# Patient Record
Sex: MALE | Race: WHITE | NOT HISPANIC OR LATINO | Employment: FULL TIME | URBAN - NONMETROPOLITAN AREA
[De-identification: names, ages, dates, MRNs, and addresses within clinical notes are randomized per-mention and may not be internally consistent; named-entity substitution may affect disease eponyms.]

---

## 2018-08-22 ENCOUNTER — OFFICE VISIT (OUTPATIENT)
Dept: CARDIOLOGY | Facility: CLINIC | Age: 48
End: 2018-08-22

## 2018-08-22 VITALS
SYSTOLIC BLOOD PRESSURE: 137 MMHG | OXYGEN SATURATION: 96 % | BODY MASS INDEX: 31.67 KG/M2 | HEIGHT: 67 IN | DIASTOLIC BLOOD PRESSURE: 91 MMHG | WEIGHT: 201.8 LBS | HEART RATE: 87 BPM

## 2018-08-22 DIAGNOSIS — Z76.89 ENCOUNTER TO ESTABLISH CARE: ICD-10-CM

## 2018-08-22 DIAGNOSIS — I10 ESSENTIAL HYPERTENSION: Primary | ICD-10-CM

## 2018-08-22 DIAGNOSIS — R07.9 CHEST PAIN, UNSPECIFIED TYPE: ICD-10-CM

## 2018-08-22 DIAGNOSIS — R06.02 SOB (SHORTNESS OF BREATH): ICD-10-CM

## 2018-08-22 PROCEDURE — 99204 OFFICE O/P NEW MOD 45 MIN: CPT | Performed by: PHYSICIAN ASSISTANT

## 2018-08-22 PROCEDURE — 93000 ELECTROCARDIOGRAM COMPLETE: CPT | Performed by: PHYSICIAN ASSISTANT

## 2018-08-22 RX ORDER — NITROGLYCERIN 0.4 MG/1
TABLET SUBLINGUAL
Qty: 100 TABLET | Refills: 11 | Status: SHIPPED | OUTPATIENT
Start: 2018-08-22

## 2018-08-22 RX ORDER — SERTRALINE HYDROCHLORIDE 100 MG/1
TABLET, FILM COATED ORAL DAILY
COMMUNITY
Start: 2015-05-01

## 2018-08-22 RX ORDER — BUTALBITAL, ACETAMINOPHEN AND CAFFEINE 300; 40; 50 MG/1; MG/1; MG/1
CAPSULE ORAL AS NEEDED
Refills: 0 | COMMUNITY
Start: 2018-08-14

## 2018-08-22 RX ORDER — LISINOPRIL 40 MG/1
40 TABLET ORAL DAILY
Refills: 1 | COMMUNITY
Start: 2018-08-14

## 2018-08-22 NOTE — PATIENT INSTRUCTIONS
Heart-Healthy Eating Plan  Many factors influence your heart health, including eating and exercise habits. Heart (coronary) risk increases with abnormal blood fat (lipid) levels. Heart-healthy meal planning includes limiting unhealthy fats, increasing healthy fats, and making other small dietary changes. This includes maintaining a healthy body weight to help keep lipid levels within a normal range.  What is my plan?  Your health care provider recommends that you:  · Get no more than _________% of the total calories in your daily diet from fat.  · Limit your intake of saturated fat to less than _________% of your total calories each day.  · Limit the amount of cholesterol in your diet to less than _________ mg per day.    What types of fat should I choose?  · Choose healthy fats more often. Choose monounsaturated and polyunsaturated fats, such as olive oil and canola oil, flaxseeds, walnuts, almonds, and seeds.  · Eat more omega-3 fats. Good choices include salmon, mackerel, sardines, tuna, flaxseed oil, and ground flaxseeds. Aim to eat fish at least two times each week.  · Limit saturated fats. Saturated fats are primarily found in animal products, such as meats, butter, and cream. Plant sources of saturated fats include palm oil, palm kernel oil, and coconut oil.  · Avoid foods with partially hydrogenated oils in them. These contain trans fats. Examples of foods that contain trans fats are stick margarine, some tub margarines, cookies, crackers, and other baked goods.  What general guidelines do I need to follow?  · Check food labels carefully to identify foods with trans fats or high amounts of saturated fat.  · Fill one half of your plate with vegetables and green salads. Eat 4-5 servings of vegetables per day. A serving of vegetables equals 1 cup of raw leafy vegetables, ½ cup of raw or cooked cut-up vegetables, or ½ cup of vegetable juice.  · Fill one fourth of your plate with whole grains. Look for the word  "\"whole\" as the first word in the ingredient list.  · Fill one fourth of your plate with lean protein foods.  · Eat 4-5 servings of fruit per day. A serving of fruit equals one medium whole fruit, ¼ cup of dried fruit, ½ cup of fresh, frozen, or canned fruit, or ½ cup of 100% fruit juice.  · Eat more foods that contain soluble fiber. Examples of foods that contain this type of fiber are apples, broccoli, carrots, beans, peas, and barley. Aim to get 20-30 g of fiber per day.  · Eat more home-cooked food and less restaurant, buffet, and fast food.  · Limit or avoid alcohol.  · Limit foods that are high in starch and sugar.  · Avoid fried foods.  · Cook foods by using methods other than frying. Baking, boiling, grilling, and broiling are all great options. Other fat-reducing suggestions include:  ? Removing the skin from poultry.  ? Removing all visible fats from meats.  ? Skimming the fat off of stews, soups, and gravies before serving them.  ? Steaming vegetables in water or broth.  · Lose weight if you are overweight. Losing just 5-10% of your initial body weight can help your overall health and prevent diseases such as diabetes and heart disease.  · Increase your consumption of nuts, legumes, and seeds to 4-5 servings per week. One serving of dried beans or legumes equals ½ cup after being cooked, one serving of nuts equals 1½ ounces, and one serving of seeds equals ½ ounce or 1 tablespoon.  · You may need to monitor your salt (sodium) intake, especially if you have high blood pressure. Talk with your health care provider or dietitian to get more information about reducing sodium.  What foods can I eat?  Grains    Breads, including Colombian, white, prachi, wheat, raisin, rye, oatmeal, and Italian. Tortillas that are neither fried nor made with lard or trans fat. Low-fat rolls, including hotdog and hamburger buns and English muffins. Biscuits. Muffins. Waffles. Pancakes. Light popcorn. Whole-grain cereals. Flatbread. " Fiona toast. Pretzels. Breadsticks. Rusks. Low-fat snacks and crackers, including oyster, saltine, matzo, hugo, animal, and rye. Rice and pasta, including brown rice and those that are made with whole wheat.  Vegetables  All vegetables.  Fruits  All fruits, but limit coconut.  Meats and Other Protein Sources  Lean, well-trimmed beef, veal, pork, and lamb. Chicken and turkey without skin. All fish and shellfish. Wild duck, rabbit, pheasant, and venison. Egg whites or low-cholesterol egg substitutes. Dried beans, peas, lentils, and tofu. Seeds and most nuts.  Dairy  Low-fat or nonfat cheeses, including ricotta, string, and mozzarella. Skim or 1% milk that is liquid, powdered, or evaporated. Buttermilk that is made with low-fat milk. Nonfat or low-fat yogurt.  Beverages  Mineral water. Diet carbonated beverages.  Sweets and Desserts  Sherbets and fruit ices. Honey, jam, marmalade, jelly, and syrups. Meringues and gelatins. Pure sugar candy, such as hard candy, jelly beans, gumdrops, mints, marshmallows, and small amounts of dark chocolate. Parag food cake.  Eat all sweets and desserts in moderation.  Fats and Oils  Nonhydrogenated (trans-free) margarines. Vegetable oils, including soybean, sesame, sunflower, olive, peanut, safflower, corn, canola, and cottonseed. Salad dressings or mayonnaise that are made with a vegetable oil. Limit added fats and oils that you use for cooking, baking, salads, and as spreads.  Other  Cocoa powder. Coffee and tea. All seasonings and condiments.  The items listed above may not be a complete list of recommended foods or beverages. Contact your dietitian for more options.  What foods are not recommended?  Grains  Breads that are made with saturated or trans fats, oils, or whole milk. Croissants. Butter rolls. Cheese breads. Sweet rolls. Donuts. Buttered popcorn. Chow mein noodles. High-fat crackers, such as cheese or butter crackers.  Meats and Other Protein Sources  Fatty meats, such  as hotdogs, short ribs, sausage, spareribs, delgado, ribeye roast or steak, and mutton. High-fat deli meats, such as salami and bologna. Caviar. Domestic duck and goose. Organ meats, such as kidney, liver, sweetbreads, brains, gizzard, chitterlings, and heart.  Dairy  Cream, sour cream, cream cheese, and creamed cottage cheese. Whole milk cheeses, including blue (inge), [place] Jerman, Brie, Ruben, American, Havarti, Swiss, cheddar, Camembert, and Putnam. Whole or 2% milk that is liquid, evaporated, or condensed. Whole buttermilk. Cream sauce or high-fat cheese sauce. Yogurt that is made from whole milk.  Beverages  Regular sodas and drinks with added sugar.  Sweets and Desserts  Frosting. Pudding. Cookies. Cakes other than odette food cake. Candy that has milk chocolate or white chocolate, hydrogenated fat, butter, coconut, or unknown ingredients. Buttered syrups. Full-fat ice cream or ice cream drinks.  Fats and Oils  Gravy that has suet, meat fat, or shortening. Cocoa butter, hydrogenated oils, palm oil, coconut oil, palm kernel oil. These can often be found in baked products, candy, fried foods, nondairy creamers, and whipped toppings. Solid fats and shortenings, including delgado fat, salt pork, lard, and butter. Nondairy cream substitutes, such as coffee creamers and sour cream substitutes. Salad dressings that are made of unknown oils, cheese, or sour cream.  The items listed above may not be a complete list of foods and beverages to avoid. Contact your dietitian for more information.  This information is not intended to replace advice given to you by your health care provider. Make sure you discuss any questions you have with your health care provider.  Document Released: 09/26/2009 Document Revised: 07/07/2017 Document Reviewed: 06/11/2015  [employer] Interactive Patient Education © 2018 [employer]  For more information:    Quit Now [place]  1-800-QUIT-NOW  https://snehay.quitlogix.org/en-US/  Steps to Quit  Smoking  Smoking tobacco can be harmful to your health and can affect almost every organ in your body. Smoking puts you, and those around you, at risk for developing many serious chronic diseases. Quitting smoking is difficult, but it is one of the best things that you can do for your health. It is never too late to quit.  What are the benefits of quitting smoking?  When you quit smoking, you lower your risk of developing serious diseases and conditions, such as:  · Lung cancer or lung disease, such as COPD.  · Heart disease.  · Stroke.  · Heart attack.  · Infertility.  · Osteoporosis and bone fractures.  Additionally, symptoms such as coughing, wheezing, and shortness of breath may get better when you quit. You may also find that you get sick less often because your body is stronger at fighting off colds and infections. If you are pregnant, quitting smoking can help to reduce your chances of having a baby of low birth weight.  How do I get ready to quit?  When you decide to quit smoking, create a plan to make sure that you are successful. Before you quit:  · Pick a date to quit. Set a date within the next two weeks to give you time to prepare.  · Write down the reasons why you are quitting. Keep this list in places where you will see it often, such as on your bathroom mirror or in your car or wallet.  · Identify the people, places, things, and activities that make you want to smoke (triggers) and avoid them. Make sure to take these actions:  ¨ Throw away all cigarettes at home, at work, and in your car.  ¨ Throw away smoking accessories, such as ashtrays and lighters.  ¨ Clean your car and make sure to empty the ashtray.  ¨ Clean your home, including curtains and carpets.  · Tell your family, friends, and coworkers that you are quitting. Support from your loved ones can make quitting easier.  · Talk with your health care provider about your options for quitting smoking.  · Find out what treatment options are  covered by your health insurance.  What strategies can I use to quit smoking?  Talk with your healthcare provider about different strategies to quit smoking. Some strategies include:  · Quitting smoking altogether instead of gradually lessening how much you smoke over a period of time. Research shows that quitting “cold turkey” is more successful than gradually quitting.  · Attending in-person counseling to help you build problem-solving skills. You are more likely to have success in quitting if you attend several counseling sessions. Even short sessions of 10 minutes can be effective.  · Finding resources and support systems that can help you to quit smoking and remain smoke-free after you quit. These resources are most helpful when you use them often. They can include:  ¨ Online chats with a counselor.  ¨ Telephone quitlines.  ¨ Printed self-help materials.  ¨ Support groups or group counseling.  ¨ Text messaging programs.  ¨ Mobile phone applications.  · Taking medicines to help you quit smoking. (If you are pregnant or breastfeeding, talk with your health care provider first.) Some medicines contain nicotine and some do not. Both types of medicines help with cravings, but the medicines that include nicotine help to relieve withdrawal symptoms. Your health care provider may recommend:  ¨ Nicotine patches, gum, or lozenges.  ¨ Nicotine inhalers or sprays.  ¨ Non-nicotine medicine that is taken by mouth.  Talk with your health care provider about combining strategies, such as taking medicines while you are also receiving in-person counseling. Using these two strategies together makes you more likely to succeed in quitting than if you used either strategy on its own.  If you are pregnant or breastfeeding, talk with your health care provider about finding counseling or other support strategies to quit smoking. Do not take medicine to help you quit smoking unless told to do so by your health care provider.  What  things can I do to make it easier to quit?  Quitting smoking might feel overwhelming at first, but there is a lot that you can do to make it easier. Take these important actions:  · Reach out to your family and friends and ask that they support and encourage you during this time. Call telephone quitlines, reach out to support groups, or work with a counselor for support.  · Ask people who smoke to avoid smoking around you.  · Avoid places that trigger you to smoke, such as bars, parties, or smoke-break areas at work.  · Spend time around people who do not smoke.  · Lessen stress in your life, because stress can be a smoking trigger for some people. To lessen stress, try:  ¨ Exercising regularly.  ¨ Deep-breathing exercises.  ¨ Yoga.  ¨ Meditating.  ¨ Performing a body scan. This involves closing your eyes, scanning your body from head to toe, and noticing which parts of your body are particularly tense. Purposefully relax the muscles in those areas.  · Download or purchase mobile phone or tablet apps (applications) that can help you stick to your quit plan by providing reminders, tips, and encouragement. There are many free apps, such as QuitGuide from the CDC (Centers for Disease Control and Prevention). You can find other support for quitting smoking (smoking cessation) through smokefree.gov and other websites.  How will I feel when I quit smoking?  Within the first 24 hours of quitting smoking, you may start to feel some withdrawal symptoms. These symptoms are usually most noticeable 2-3 days after quitting, but they usually do not last beyond 2-3 weeks. Changes or symptoms that you might experience include:  · Mood swings.  · Restlessness, anxiety, or irritation.  · Difficulty concentrating.  · Dizziness.  · Strong cravings for sugary foods in addition to nicotine.  · Mild weight gain.  · Constipation.  · Nausea.  · Coughing or a sore throat.  · Changes in how your medicines work in your body.  · A depressed  mood.  · Difficulty sleeping (insomnia).  After the first 2-3 weeks of quitting, you may start to notice more positive results, such as:  · Improved sense of smell and taste.  · Decreased coughing and sore throat.  · Slower heart rate.  · Lower blood pressure.  · Clearer skin.  · The ability to breathe more easily.  · Fewer sick days.  Quitting smoking is very challenging for most people. Do not get discouraged if you are not successful the first time. Some people need to make many attempts to quit before they achieve long-term success. Do your best to stick to your quit plan, and talk with your health care provider if you have any questions or concerns.  This information is not intended to replace advice given to you by your health care provider. Make sure you discuss any questions you have with your health care provider.  Document Released: 12/12/2002 Document Revised: 08/15/2017 Document Reviewed: 05/03/2016  [employer] Interactive Patient Education © 2017 [employer]

## 2018-08-22 NOTE — PROGRESS NOTES
Subjective   Ronaldo Spain is a 48 y.o. male     Chief Complaint   Patient presents with   • Establish Care     presents to [facility], [place] in 2013   • Hypertension   • Shortness of Breath       HPI    Patient is a 48-year-old male that presents to the office for an initial evaluation.  Patient describes having history of coronary artery disease.  He describes suffering a myocardial infarction in the past.  In 2013 he underwent cardiac catheterization by Dr. Sanchez in [place].  We currently do not have records.  He described not having any stenting procedures performed.    Patient has not seen a cardiologist or been under cardiology care since 2013.  He has discomfort at times which is very minimal.  He relates more to his shortness of breath which has progressed.  However, he has scant weight continues to smoke and relates it to those entities.    He does not have any PND orthopnea.  He doesn't palpitate or have dysrhythmic symptoms.  He otherwise is doing well and voices no complaints      Current Outpatient Prescriptions   Medication Sig Dispense Refill   • butalbital-acetaminophen-caffeine (ORBIVAN) -40 MG capsule capsule As Needed.  0   • lisinopril (PRINIVIL,ZESTRIL) 40 MG tablet Take 40 mg by mouth Daily. for blood pressure  1   • sertraline (ZOLOFT) 100 MG tablet Take  by mouth Daily.     • aspirin 81 MG tablet Take 1 tablet by mouth Daily. 30 tablet 11   • nitroglycerin (NITROSTAT) 0.4 MG SL tablet 1 under the tongue as needed for angina, may repeat q5mins for up three doses 100 tablet 11     No current facility-administered medications for this visit.        Hydrocodone-acetaminophen; Protonix  [pantoprazole sodium]; Penicillins; and Sulfa antibiotics    Past Medical History:   Diagnosis Date   • Crohn's disease (CMS/[facility])    • Myocardial infarction 08/18/2013       Social History     Social History   • Marital status: Single     Spouse name: N/A   • Number of children: N/A   • Years of  "education: N/A     Occupational History   • Not on file.     Social History Main Topics   • Smoking status: Current Every Day Smoker     Packs/day: 1.00     Years: 35.00     Types: Cigarettes   • Smokeless tobacco: Never Used   • Alcohol use No   • Drug use: No   • Sexual activity: Not on file     Other Topics Concern   • Not on file     Social History Narrative   • No narrative on file           Family History   Problem Relation Age of Onset   • Hypertension Mother    • Stroke Mother        Review of Systems   Constitutional: Positive for diaphoresis and fatigue.   HENT: Negative.    Eyes: Positive for visual disturbance (wears glasses).   Respiratory: Positive for shortness of breath (on exertion).    Cardiovascular: Positive for chest pain and leg swelling. Negative for palpitations.   Gastrointestinal: Positive for constipation, diarrhea, nausea and vomiting.        Crohns disease   Endocrine: Negative.    Genitourinary: Positive for difficulty urinating ( slow flow).   Musculoskeletal: Positive for arthralgias, back pain, myalgias and neck pain.   Skin: Negative.    Allergic/Immunologic: Negative.    Neurological: Negative.    Hematological: Negative.    Psychiatric/Behavioral: Positive for sleep disturbance.   All other systems reviewed and are negative.      Objective   Vitals:    08/22/18 1332   BP: 137/91   BP Location: Right arm   Patient Position: Sitting   Pulse: 87   SpO2: 96%   Weight: 91.5 kg (201 lb 12.8 oz)   Height: 170.2 cm (67\")      /91 (BP Location: Right arm, Patient Position: Sitting)   Pulse 87   Ht 170.2 cm (67\")   Wt 91.5 kg (201 lb 12.8 oz)   SpO2 96%   BMI 31.61 kg/m²     Lab Results (most recent)     None          Physical Exam   Constitutional: He is oriented to person, place, and time. He appears well-developed and well-nourished. No distress.   HENT:   Head: Normocephalic and atraumatic.   Eyes: Pupils are equal, round, and reactive to light. EOM are normal.   Neck: No JVD " present.   Cardiovascular: Normal rate, regular rhythm, normal heart sounds and intact distal pulses.  Exam reveals no gallop and no friction rub.    No murmur heard.  Pulmonary/Chest: Effort normal and breath sounds normal. No respiratory distress. He has no wheezes. He has no rales. He exhibits no tenderness.   Musculoskeletal: Normal range of motion. He exhibits no edema.   Neurological: He is alert and oriented to person, place, and time. No cranial nerve deficit.   Skin: Skin is warm and dry. No rash noted. No erythema. No pallor.   Psychiatric: He has a normal mood and affect. His behavior is normal.   Nursing note and vitals reviewed.      Procedure     ECG 12 Lead  Date/Time: 8/22/2018 1:45 PM  Performed by: MAXIMO MAYES  Authorized by: MAXIMO MAYES   Comments: EKG demonstrates sinus rhythm at 90 bpm with no acute ST changes                 Assessment/Plan     Problems Addressed this Visit        Cardiovascular and Mediastinum    Essential hypertension - Primary    Relevant Medications    lisinopril (PRINIVIL,ZESTRIL) 40 MG tablet    Other Relevant Orders    ECG 12 Lead    Adult Transthoracic Echo Complete W/ Cont if Necessary Per Protocol    Stress Test With Myocardial Perfusion One Day       Respiratory    SOB (shortness of breath)    Relevant Orders    ECG 12 Lead    Adult Transthoracic Echo Complete W/ Cont if Necessary Per Protocol    Stress Test With Myocardial Perfusion One Day       Nervous and Auditory    Chest pain       Other    Encounter to establish care    Relevant Orders    ECG 12 Lead    Adult Transthoracic Echo Complete W/ Cont if Necessary Per Protocol    Stress Test With Myocardial Perfusion One Day              Recommendation  1.  Patient with history of myocardial infarction but we currently do not have any records.  He complains of shortness of breath and occasional discomfort.  I feel cardiac evaluation is warranted.  I would like to reassess LV function and evaluate and rule  out any evidence of cardiomyopathy.  I would like to obtain an echocardiogram.  Stress test to rule out any ischemia because of progressive dyspnea and occasional chest pain.  I recommended him starting aspirin daily.  I will send in nitroglycerin..  We'll send in nitroglycerin as needed for chest pain.  He will follow-up with primary as scheduled.  He will follow-up with her office after testing         I advised Ronaldo of the risks of continuing to use tobacco, and I provided him with tobacco cessation educational materials in the After Visit Summary.     During this visit, I spent <3 minutes counseling the patient regarding tobacco cessation.     Patient's Body mass index is 31.61 kg/m². BMI is above normal parameters. Recommendations include: educational material.         Electronically signed by:

## 2020-06-10 ENCOUNTER — TRANSCRIBE ORDERS (OUTPATIENT)
Dept: ADMINISTRATIVE | Facility: HOSPITAL | Age: 50
End: 2020-06-10

## 2020-06-10 DIAGNOSIS — R07.89 CHEST WALL PAIN: Primary | ICD-10-CM

## 2020-06-11 ENCOUNTER — HOSPITAL ENCOUNTER (OUTPATIENT)
Dept: CT IMAGING | Facility: HOSPITAL | Age: 50
Discharge: HOME | End: 2020-06-11
Admitting: FAMILY MEDICINE

## 2020-06-11 DIAGNOSIS — R07.89 CHEST WALL PAIN: ICD-10-CM

## 2020-06-11 PROCEDURE — 71250 CT THORAX DX C-: CPT | Performed by: RADIOLOGY

## 2020-06-11 PROCEDURE — 71250 CT THORAX DX C-: CPT

## 2021-03-18 ENCOUNTER — BULK ORDERING (OUTPATIENT)
Dept: CASE MANAGEMENT | Facility: OTHER | Age: 51
End: 2021-03-18

## 2021-03-18 DIAGNOSIS — Z23 IMMUNIZATION DUE: ICD-10-CM

## 2021-10-20 ENCOUNTER — TRANSCRIBE ORDERS (OUTPATIENT)
Dept: ADMINISTRATIVE | Facility: HOSPITAL | Age: 51
End: 2021-10-20

## 2021-10-20 DIAGNOSIS — M79.601 BILATERAL ARM PAIN: Primary | ICD-10-CM

## 2021-10-20 DIAGNOSIS — M79.602 BILATERAL ARM PAIN: Primary | ICD-10-CM

## 2021-10-25 ENCOUNTER — HOSPITAL ENCOUNTER (OUTPATIENT)
Dept: MRI IMAGING | Facility: HOSPITAL | Age: 51
Discharge: HOME | End: 2021-10-25
Admitting: FAMILY MEDICINE

## 2021-10-25 DIAGNOSIS — M79.601 BILATERAL ARM PAIN: ICD-10-CM

## 2021-10-25 DIAGNOSIS — M79.602 BILATERAL ARM PAIN: ICD-10-CM

## 2021-10-25 PROCEDURE — 72141 MRI NECK SPINE W/O DYE: CPT | Performed by: RADIOLOGY

## 2021-10-25 PROCEDURE — 72141 MRI NECK SPINE W/O DYE: CPT

## 2022-02-28 ENCOUNTER — TRANSCRIBE ORDERS (OUTPATIENT)
Dept: LAB | Facility: HOSPITAL | Age: 52
End: 2022-02-28

## 2022-02-28 DIAGNOSIS — Z01.818 OTHER SPECIFIED PRE-OPERATIVE EXAMINATION: Primary | ICD-10-CM

## 2022-03-02 ENCOUNTER — LAB (OUTPATIENT)
Dept: LAB | Facility: HOSPITAL | Age: 52
End: 2022-03-02

## 2022-03-02 DIAGNOSIS — Z01.818 OTHER SPECIFIED PRE-OPERATIVE EXAMINATION: ICD-10-CM

## 2022-03-02 LAB — SARS-COV-2 RNA PNL SPEC NAA+PROBE: NOT DETECTED

## 2022-03-02 PROCEDURE — C9803 HOPD COVID-19 SPEC COLLECT: HCPCS

## 2022-03-02 PROCEDURE — U0004 COV-19 TEST NON-CDC HGH THRU: HCPCS | Performed by: NEUROLOGICAL SURGERY

## 2022-03-12 ENCOUNTER — HOSPITAL ENCOUNTER (EMERGENCY)
Facility: HOSPITAL | Age: 52
Discharge: HOME | End: 2022-03-12
Attending: EMERGENCY MEDICINE | Admitting: FAMILY MEDICINE

## 2022-03-12 ENCOUNTER — APPOINTMENT (OUTPATIENT)
Dept: ULTRASOUND IMAGING | Facility: HOSPITAL | Age: 52
End: 2022-03-12

## 2022-03-12 VITALS
OXYGEN SATURATION: 99 % | RESPIRATION RATE: 14 BRPM | TEMPERATURE: 97.1 F | HEART RATE: 85 BPM | DIASTOLIC BLOOD PRESSURE: 78 MMHG | BODY MASS INDEX: 34.6 KG/M2 | WEIGHT: 220.46 LBS | SYSTOLIC BLOOD PRESSURE: 169 MMHG | HEIGHT: 67 IN

## 2022-03-12 DIAGNOSIS — I80.02 THROMBOPHLEBITIS OF SUPERFICIAL VEINS OF LEFT LOWER EXTREMITY: Primary | ICD-10-CM

## 2022-03-12 LAB
ALBUMIN SERPL-MCNC: 4.62 G/DL (ref 3.5–5.2)
ALBUMIN/GLOB SERPL: 1.4 G/DL
ALP SERPL-CCNC: 113 U/L (ref 39–117)
ALT SERPL W P-5'-P-CCNC: 21 U/L (ref 1–41)
ANION GAP SERPL CALCULATED.3IONS-SCNC: 15 MMOL/L (ref 5–15)
APTT PPP: 27 SECONDS (ref 25.5–35.4)
AST SERPL-CCNC: 21 U/L (ref 1–40)
BASOPHILS # BLD AUTO: 0.03 10*3/MM3 (ref 0–0.2)
BASOPHILS NFR BLD AUTO: 0.4 % (ref 0–1.5)
BILIRUB SERPL-MCNC: 0.3 MG/DL (ref 0–1.2)
BUN SERPL-MCNC: 17 MG/DL (ref 6–20)
BUN/CREAT SERPL: 12.5 (ref 7–25)
CALCIUM SPEC-SCNC: 9.9 MG/DL (ref 8.6–10.5)
CHLORIDE SERPL-SCNC: 99 MMOL/L (ref 98–107)
CO2 SERPL-SCNC: 25 MMOL/L (ref 22–29)
CREAT SERPL-MCNC: 1.36 MG/DL (ref 0.76–1.27)
CRP SERPL-MCNC: 5.54 MG/DL (ref 0–0.5)
D-LACTATE SERPL-SCNC: 1.1 MMOL/L (ref 0.5–2)
DEPRECATED RDW RBC AUTO: 46.3 FL (ref 37–54)
EGFRCR SERPLBLD CKD-EPI 2021: 61.8 ML/MIN/1.73
EOSINOPHIL # BLD AUTO: 0.19 10*3/MM3 (ref 0–0.4)
EOSINOPHIL NFR BLD AUTO: 2.3 % (ref 0.3–6.2)
ERYTHROCYTE [DISTWIDTH] IN BLOOD BY AUTOMATED COUNT: 13 % (ref 12.3–15.4)
ERYTHROCYTE [SEDIMENTATION RATE] IN BLOOD: 33 MM/HR (ref 0–20)
GLOBULIN UR ELPH-MCNC: 3.4 GM/DL
GLUCOSE SERPL-MCNC: 94 MG/DL (ref 65–99)
HCT VFR BLD AUTO: 51.2 % (ref 37.5–51)
HGB BLD-MCNC: 17 G/DL (ref 13–17.7)
HOLD SPECIMEN: NORMAL
HOLD SPECIMEN: NORMAL
IMM GRANULOCYTES # BLD AUTO: 0.02 10*3/MM3 (ref 0–0.05)
IMM GRANULOCYTES NFR BLD AUTO: 0.2 % (ref 0–0.5)
INR PPP: 0.87 (ref 0.9–1.1)
LYMPHOCYTES # BLD AUTO: 2.21 10*3/MM3 (ref 0.7–3.1)
LYMPHOCYTES NFR BLD AUTO: 26.4 % (ref 19.6–45.3)
MAGNESIUM SERPL-MCNC: 2.1 MG/DL (ref 1.6–2.6)
MCH RBC QN AUTO: 32.1 PG (ref 26.6–33)
MCHC RBC AUTO-ENTMCNC: 33.2 G/DL (ref 31.5–35.7)
MCV RBC AUTO: 96.8 FL (ref 79–97)
MONOCYTES # BLD AUTO: 0.66 10*3/MM3 (ref 0.1–0.9)
MONOCYTES NFR BLD AUTO: 7.9 % (ref 5–12)
NEUTROPHILS NFR BLD AUTO: 5.26 10*3/MM3 (ref 1.7–7)
NEUTROPHILS NFR BLD AUTO: 62.8 % (ref 42.7–76)
NRBC BLD AUTO-RTO: 0 /100 WBC (ref 0–0.2)
PLATELET # BLD AUTO: 277 10*3/MM3 (ref 140–450)
PMV BLD AUTO: 10.2 FL (ref 6–12)
POTASSIUM SERPL-SCNC: 4 MMOL/L (ref 3.5–5.2)
PROCALCITONIN SERPL-MCNC: 0.06 NG/ML (ref 0–0.25)
PROT SERPL-MCNC: 8 G/DL (ref 6–8.5)
PROTHROMBIN TIME: 12.2 SECONDS (ref 12.8–14.5)
RBC # BLD AUTO: 5.29 10*6/MM3 (ref 4.14–5.8)
SODIUM SERPL-SCNC: 139 MMOL/L (ref 136–145)
WBC NRBC COR # BLD: 8.37 10*3/MM3 (ref 3.4–10.8)
WHOLE BLOOD HOLD SPECIMEN: NORMAL
WHOLE BLOOD HOLD SPECIMEN: NORMAL

## 2022-03-12 PROCEDURE — 93971 EXTREMITY STUDY: CPT

## 2022-03-12 PROCEDURE — 85610 PROTHROMBIN TIME: CPT | Performed by: PHYSICIAN ASSISTANT

## 2022-03-12 PROCEDURE — 84145 PROCALCITONIN (PCT): CPT | Performed by: PHYSICIAN ASSISTANT

## 2022-03-12 PROCEDURE — 83735 ASSAY OF MAGNESIUM: CPT | Performed by: PHYSICIAN ASSISTANT

## 2022-03-12 PROCEDURE — 87040 BLOOD CULTURE FOR BACTERIA: CPT | Performed by: PHYSICIAN ASSISTANT

## 2022-03-12 PROCEDURE — 83605 ASSAY OF LACTIC ACID: CPT | Performed by: PHYSICIAN ASSISTANT

## 2022-03-12 PROCEDURE — 86140 C-REACTIVE PROTEIN: CPT | Performed by: PHYSICIAN ASSISTANT

## 2022-03-12 PROCEDURE — 99283 EMERGENCY DEPT VISIT LOW MDM: CPT

## 2022-03-12 PROCEDURE — 85025 COMPLETE CBC W/AUTO DIFF WBC: CPT | Performed by: PHYSICIAN ASSISTANT

## 2022-03-12 PROCEDURE — 85652 RBC SED RATE AUTOMATED: CPT | Performed by: PHYSICIAN ASSISTANT

## 2022-03-12 PROCEDURE — 85730 THROMBOPLASTIN TIME PARTIAL: CPT | Performed by: PHYSICIAN ASSISTANT

## 2022-03-12 PROCEDURE — 80053 COMPREHEN METABOLIC PANEL: CPT | Performed by: PHYSICIAN ASSISTANT

## 2022-03-12 RX ORDER — CEPHALEXIN 250 MG/1
500 CAPSULE ORAL ONCE
Status: COMPLETED | OUTPATIENT
Start: 2022-03-12 | End: 2022-03-12

## 2022-03-12 RX ORDER — CEPHALEXIN 500 MG/1
500 CAPSULE ORAL 4 TIMES DAILY
Qty: 40 CAPSULE | Refills: 0 | Status: SHIPPED | OUTPATIENT
Start: 2022-03-12 | End: 2022-03-22

## 2022-03-12 RX ADMIN — CEPHALEXIN 500 MG: 250 CAPSULE ORAL at 20:27

## 2022-03-12 NOTE — ED PROVIDER NOTES
Subjective   52-year-old male presents to the ED today for right lower extremity pain and swelling.  He is concerned that he may have a DVT.  He had neck surgery on March 4.  He states yesterday he started to have pain in his left lower anterior leg and he started to develop swelling he states he typically does not ever have any lower extremity swelling.  Today the area also appeared red.  He states he did have 2 IVs in his left foot and ankle area for the surgery because they could not find any other area for access.  He denies any history of blood clots.  He did take an aspirin last night in case he had a blood clot.  He is a smoker.  He denies any shortness of breath.      History provided by:  Patient  Leg Pain  Location:  Leg  Time since incident:  2 days  Injury: no    Leg location:  L lower leg  Pain details:     Quality:  Aching    Radiates to:  Does not radiate    Severity:  Moderate    Onset quality:  Gradual    Timing:  Constant    Progression:  Worsening  Chronicity:  New  Prior injury to area:  No  Relieved by:  Nothing  Worsened by:  Activity and bearing weight  Associated symptoms: swelling    Associated symptoms: no decreased ROM, no fever and no muscle weakness        Review of Systems   Constitutional: Negative.  Negative for fever.   HENT: Negative.    Eyes: Negative.    Respiratory: Negative.  Negative for shortness of breath.    Cardiovascular: Positive for leg swelling. Negative for chest pain.   Gastrointestinal: Negative.    Genitourinary: Negative.    Musculoskeletal: Negative.    Skin: Positive for color change.   Neurological: Negative.    Psychiatric/Behavioral: Negative.    All other systems reviewed and are negative.      Past Medical History:   Diagnosis Date   • Crohn's disease (CMS/[facility])    • Myocardial infarction 08/18/2013       Allergies   Allergen Reactions   • Hydrocodone-Acetaminophen Other (See Comments)   • Protonix  [Pantoprazole Sodium] Myalgia   • Zofran [Ondansetron] Other  (See Comments)     Ab pain   • Penicillins Rash   • Sulfa Antibiotics Rash       Past Surgical History:   Procedure Laterality Date   • BACK SURGERY     • TONSILLECTOMY AND ADENOIDECTOMY         Family History   Problem Relation Age of Onset   • Hypertension Mother    • Stroke Mother        Social History     Socioeconomic History   • Marital status: Single   Tobacco Use   • Smoking status: Current Every Day Smoker     Packs/day: 1.00     Years: 35.00     Pack years: 35.00     Types: Cigarettes   • Smokeless tobacco: Never Used   Substance and Sexual Activity   • Alcohol use: No   • Drug use: No           Objective   Physical Exam  Vitals and nursing note reviewed.   Constitutional:       General: He is not in acute distress.     Appearance: Normal appearance.   HENT:      Head: Normocephalic and atraumatic.      Right Ear: External ear normal.      Left Ear: External ear normal.   Eyes:      Extraocular Movements: Extraocular movements intact.      Conjunctiva/sclera: Conjunctivae normal.      Pupils: Pupils are equal, round, and reactive to light.   Cardiovascular:      Rate and Rhythm: Regular rhythm. Tachycardia present.      Pulses: Normal pulses.      Heart sounds: Normal heart sounds.   Pulmonary:      Effort: Pulmonary effort is normal.      Breath sounds: Normal breath sounds. No wheezing or rhonchi.   Chest:      Chest wall: No tenderness.   Abdominal:      General: Bowel sounds are normal.      Palpations: Abdomen is soft.   Musculoskeletal:         General: Swelling (left lower leg) and tenderness (left lower anterior leg) present. No deformity.      Comments: Mild swelling noted to left foot, ankle and lower leg. Tenderness to left anterior lower shin with palpation, mild redness noted. Can see the 2 puncture areas where his IV's were. Pedal pulse is 2+ on the left, sensation intact, cap refill is normal   Skin:     General: Skin is warm and dry.      Capillary Refill: Capillary refill takes less than 2  seconds.      Findings: Erythema (mild to left lower anterior leg) present.   Neurological:      General: No focal deficit present.      Mental Status: He is alert and oriented to person, place, and time.   Psychiatric:         Mood and Affect: Mood normal.         Procedures           ED Course  ED Course as of 03/12/22 2013   Sat Mar 12, 2022   1904 US Venous Doppler Lower Extremity Left (duplex)  FINDINGS:   The examination is negative. There is no evidence of deep venous thrombosis from the groin to the lower calf. Saphenofemoral junction is patent.     IMPRESSION:  Negative examination.  No evidence of left lower extremity DVT. [AH]   2013 No DVT seen on ultrasound.  Most likely the patient has superficial thrombophlebitis from having 2 IVs in this area.  He will be started on antibiotics and he was advised to elevate his leg and use warm compresses as needed.  He will follow-up outpatient and will return to the ED if his symptoms change or worsen. [AH]      ED Course User Index  [AH] Christy Meyers, PA                                                 MDM  Number of Diagnoses or Management Options     Amount and/or Complexity of Data Reviewed  Clinical lab tests: reviewed  Tests in the radiology section of CPT®: reviewed    Patient Progress  Patient progress: stable      Final diagnoses:   Thrombophlebitis of superficial veins of left lower extremity       ED Disposition  ED Disposition     ED Disposition   Discharge    Condition   Stable    Comment   --             Froilan Lott PA  [address] [place] [address] [place] [address] [phone]    Schedule an appointment as soon as possible for a visit in 3 days           Medication List      New Prescriptions    cephalexin 500 MG capsule  Commonly known as: KEFLEX  Take 1 capsule by mouth 4 (Four) Times a Day for 10 days.           Where to Get Your Medications      You can get these medications from any pharmacy    Bring a paper prescription for each of these  medications  · cephalexin 500 MG capsule          Christy Meyers PA  03/12/22 2013

## 2022-03-17 LAB
BACTERIA SPEC AEROBE CULT: NORMAL
BACTERIA SPEC AEROBE CULT: NORMAL

## 2022-06-17 ENCOUNTER — TRANSCRIBE ORDERS (OUTPATIENT)
Dept: ADMINISTRATIVE | Facility: HOSPITAL | Age: 52
End: 2022-06-17

## 2022-06-22 ENCOUNTER — TRANSCRIBE ORDERS (OUTPATIENT)
Dept: ADMINISTRATIVE | Facility: HOSPITAL | Age: 52
End: 2022-06-22

## 2022-06-22 ENCOUNTER — LAB (OUTPATIENT)
Dept: LAB | Facility: HOSPITAL | Age: 52
End: 2022-06-22

## 2022-06-22 DIAGNOSIS — K50.811 CROHN'S DISEASE OF BOTH SMALL AND LARGE INTESTINE WITH RECTAL BLEEDING: ICD-10-CM

## 2022-06-22 DIAGNOSIS — I10 ESSENTIAL HYPERTENSION, MALIGNANT: Primary | ICD-10-CM

## 2022-06-22 DIAGNOSIS — I10 ESSENTIAL HYPERTENSION, MALIGNANT: ICD-10-CM

## 2022-06-22 PROCEDURE — 85025 COMPLETE CBC W/AUTO DIFF WBC: CPT

## 2022-06-22 PROCEDURE — 36415 COLL VENOUS BLD VENIPUNCTURE: CPT

## 2022-06-22 PROCEDURE — 82043 UR ALBUMIN QUANTITATIVE: CPT

## 2022-06-22 PROCEDURE — 86140 C-REACTIVE PROTEIN: CPT

## 2022-06-22 PROCEDURE — 80053 COMPREHEN METABOLIC PANEL: CPT

## 2022-06-22 PROCEDURE — 82384 ASSAY THREE CATECHOLAMINES: CPT

## 2022-06-22 PROCEDURE — 83735 ASSAY OF MAGNESIUM: CPT

## 2022-06-22 PROCEDURE — 82570 ASSAY OF URINE CREATININE: CPT

## 2022-06-23 LAB
ALBUMIN SERPL-MCNC: 4.4 G/DL (ref 3.5–5.2)
ALBUMIN UR-MCNC: 7.9 MG/DL
ALBUMIN/GLOB SERPL: 1.7 G/DL
ALP SERPL-CCNC: 106 U/L (ref 39–117)
ALT SERPL W P-5'-P-CCNC: 22 U/L (ref 1–41)
ANION GAP SERPL CALCULATED.3IONS-SCNC: 13 MMOL/L (ref 5–15)
AST SERPL-CCNC: 25 U/L (ref 1–40)
BASOPHILS # BLD AUTO: 0.04 10*3/MM3 (ref 0–0.2)
BASOPHILS NFR BLD AUTO: 0.5 % (ref 0–1.5)
BILIRUB SERPL-MCNC: 0.5 MG/DL (ref 0–1.2)
BUN SERPL-MCNC: 12 MG/DL (ref 6–20)
BUN/CREAT SERPL: 12.1 (ref 7–25)
CALCIUM SPEC-SCNC: 9.5 MG/DL (ref 8.6–10.5)
CHLORIDE SERPL-SCNC: 105 MMOL/L (ref 98–107)
CO2 SERPL-SCNC: 23 MMOL/L (ref 22–29)
CREAT SERPL-MCNC: 0.99 MG/DL (ref 0.76–1.27)
CREAT UR-MCNC: 170.3 MG/DL
CRP SERPL-MCNC: 0.68 MG/DL (ref 0–0.5)
DEPRECATED RDW RBC AUTO: 44.1 FL (ref 37–54)
EGFRCR SERPLBLD CKD-EPI 2021: 90.5 ML/MIN/1.73
EOSINOPHIL # BLD AUTO: 0.14 10*3/MM3 (ref 0–0.4)
EOSINOPHIL NFR BLD AUTO: 1.8 % (ref 0.3–6.2)
ERYTHROCYTE [DISTWIDTH] IN BLOOD BY AUTOMATED COUNT: 12.7 % (ref 12.3–15.4)
GLOBULIN UR ELPH-MCNC: 2.6 GM/DL
GLUCOSE SERPL-MCNC: 89 MG/DL (ref 65–99)
HCT VFR BLD AUTO: 54.7 % (ref 37.5–51)
HGB BLD-MCNC: 18.6 G/DL (ref 13–17.7)
IMM GRANULOCYTES # BLD AUTO: 0.02 10*3/MM3 (ref 0–0.05)
IMM GRANULOCYTES NFR BLD AUTO: 0.3 % (ref 0–0.5)
LYMPHOCYTES # BLD AUTO: 2.15 10*3/MM3 (ref 0.7–3.1)
LYMPHOCYTES NFR BLD AUTO: 27.5 % (ref 19.6–45.3)
MAGNESIUM SERPL-MCNC: 2.2 MG/DL (ref 1.6–2.6)
MCH RBC QN AUTO: 32.1 PG (ref 26.6–33)
MCHC RBC AUTO-ENTMCNC: 34 G/DL (ref 31.5–35.7)
MCV RBC AUTO: 94.3 FL (ref 79–97)
MICROALBUMIN/CREAT UR: 46.4 MG/G
MONOCYTES # BLD AUTO: 0.61 10*3/MM3 (ref 0.1–0.9)
MONOCYTES NFR BLD AUTO: 7.8 % (ref 5–12)
NEUTROPHILS NFR BLD AUTO: 4.85 10*3/MM3 (ref 1.7–7)
NEUTROPHILS NFR BLD AUTO: 62.1 % (ref 42.7–76)
NRBC BLD AUTO-RTO: 0 /100 WBC (ref 0–0.2)
PLATELET # BLD AUTO: 243 10*3/MM3 (ref 140–450)
PMV BLD AUTO: 11.8 FL (ref 6–12)
POTASSIUM SERPL-SCNC: 4.4 MMOL/L (ref 3.5–5.2)
PROT SERPL-MCNC: 7 G/DL (ref 6–8.5)
RBC # BLD AUTO: 5.8 10*6/MM3 (ref 4.14–5.8)
SODIUM SERPL-SCNC: 141 MMOL/L (ref 136–145)
WBC NRBC COR # BLD: 7.81 10*3/MM3 (ref 3.4–10.8)

## 2022-06-24 ENCOUNTER — LAB (OUTPATIENT)
Dept: LAB | Facility: HOSPITAL | Age: 52
End: 2022-06-24

## 2022-06-24 DIAGNOSIS — I10 ESSENTIAL HYPERTENSION, MALIGNANT: ICD-10-CM

## 2022-06-24 DIAGNOSIS — K50.811 CROHN'S DISEASE OF BOTH SMALL AND LARGE INTESTINE WITH RECTAL BLEEDING: ICD-10-CM

## 2022-06-24 PROCEDURE — 81050 URINALYSIS VOLUME MEASURE: CPT

## 2022-06-24 PROCEDURE — 83835 ASSAY OF METANEPHRINES: CPT

## 2022-06-24 PROCEDURE — 83993 ASSAY FOR CALPROTECTIN FECAL: CPT

## 2022-06-27 LAB
DOPAMINE SERPL-MCNC: <30 PG/ML (ref 0–48)
EPINEPH PLAS-MCNC: 59 PG/ML (ref 0–62)
NOREPINEPH PLAS-MCNC: 440 PG/ML (ref 0–874)

## 2022-06-28 LAB — CALPROTECTIN STL-MCNT: 30 UG/G (ref 0–120)

## 2022-06-29 LAB
METANEPH 24H UR-MRATE: 150 UG/24 HR (ref 58–276)
METANEPHS 24H UR-MCNC: 300 UG/L
NORMETANEPHRINE 24H UR-MCNC: 606 UG/L
NORMETANEPHRINE 24H UR-MRATE: 303 UG/24 HR (ref 156–729)

## 2023-01-01 ENCOUNTER — ANESTHESIA (OUTPATIENT)
Dept: PERIOP | Facility: HOSPITAL | Age: 53
DRG: 219 | End: 2023-01-01
Payer: COMMERCIAL

## 2023-01-01 ENCOUNTER — APPOINTMENT (OUTPATIENT)
Dept: GENERAL RADIOLOGY | Facility: HOSPITAL | Age: 53
DRG: 219 | End: 2023-01-01
Payer: COMMERCIAL

## 2023-01-01 ENCOUNTER — HOSPITAL ENCOUNTER (OUTPATIENT)
Facility: HOSPITAL | Age: 53
Setting detail: SURGERY ADMIT
DRG: 219 | End: 2023-01-01
Attending: THORACIC SURGERY (CARDIOTHORACIC VASCULAR SURGERY) | Admitting: THORACIC SURGERY (CARDIOTHORACIC VASCULAR SURGERY)
Payer: COMMERCIAL

## 2023-01-01 ENCOUNTER — HOSPITAL ENCOUNTER (INPATIENT)
Facility: HOSPITAL | Age: 53
LOS: 5 days | DRG: 219 | End: 2023-11-14
Attending: THORACIC SURGERY (CARDIOTHORACIC VASCULAR SURGERY) | Admitting: THORACIC SURGERY (CARDIOTHORACIC VASCULAR SURGERY)
Payer: COMMERCIAL

## 2023-01-01 ENCOUNTER — ANESTHESIA EVENT CONVERTED (OUTPATIENT)
Dept: ANESTHESIOLOGY | Facility: HOSPITAL | Age: 53
DRG: 219 | End: 2023-01-01
Payer: COMMERCIAL

## 2023-01-01 ENCOUNTER — HOSPITAL ENCOUNTER (INPATIENT)
Facility: HOSPITAL | Age: 53
LOS: 1 days | Discharge: OTHER INSTITUTION | End: 2023-11-09
Attending: STUDENT IN AN ORGANIZED HEALTH CARE EDUCATION/TRAINING PROGRAM | Admitting: HOSPITALIST
Payer: COMMERCIAL

## 2023-01-01 ENCOUNTER — ANCILLARY PROCEDURE (OUTPATIENT)
Dept: PERIOP | Facility: HOSPITAL | Age: 53
DRG: 219 | End: 2023-01-01
Payer: COMMERCIAL

## 2023-01-01 ENCOUNTER — APPOINTMENT (OUTPATIENT)
Dept: GENERAL RADIOLOGY | Facility: HOSPITAL | Age: 53
End: 2023-01-01
Payer: COMMERCIAL

## 2023-01-01 ENCOUNTER — ANESTHESIA EVENT (OUTPATIENT)
Dept: PERIOP | Facility: HOSPITAL | Age: 53
DRG: 219 | End: 2023-01-01
Payer: COMMERCIAL

## 2023-01-01 ENCOUNTER — APPOINTMENT (OUTPATIENT)
Dept: CARDIOLOGY | Facility: HOSPITAL | Age: 53
DRG: 219 | End: 2023-01-01
Payer: COMMERCIAL

## 2023-01-01 ENCOUNTER — APPOINTMENT (OUTPATIENT)
Dept: PULMONOLOGY | Facility: HOSPITAL | Age: 53
DRG: 219 | End: 2023-01-01
Payer: COMMERCIAL

## 2023-01-01 ENCOUNTER — TELEPHONE (OUTPATIENT)
Dept: PULMONOLOGY | Facility: CLINIC | Age: 53
End: 2023-01-01

## 2023-01-01 ENCOUNTER — APPOINTMENT (OUTPATIENT)
Dept: CARDIOLOGY | Facility: HOSPITAL | Age: 53
End: 2023-01-01
Payer: COMMERCIAL

## 2023-01-01 VITALS
WEIGHT: 207.01 LBS | BODY MASS INDEX: 32.49 KG/M2 | TEMPERATURE: 97.2 F | SYSTOLIC BLOOD PRESSURE: 136 MMHG | OXYGEN SATURATION: 94 % | DIASTOLIC BLOOD PRESSURE: 96 MMHG | RESPIRATION RATE: 18 BRPM | HEIGHT: 67 IN | HEART RATE: 60 BPM

## 2023-01-01 VITALS
WEIGHT: 215.61 LBS | SYSTOLIC BLOOD PRESSURE: 100 MMHG | DIASTOLIC BLOOD PRESSURE: 67 MMHG | RESPIRATION RATE: 19 BRPM | BODY MASS INDEX: 33.84 KG/M2 | OXYGEN SATURATION: 93 % | HEIGHT: 67 IN | HEART RATE: 80 BPM | TEMPERATURE: 98.1 F

## 2023-01-01 DIAGNOSIS — I25.119 CORONARY ARTERY DISEASE INVOLVING NATIVE HEART WITH ANGINA PECTORIS, UNSPECIFIED VESSEL OR LESION TYPE: ICD-10-CM

## 2023-01-01 DIAGNOSIS — J84.9 ILD (INTERSTITIAL LUNG DISEASE): ICD-10-CM

## 2023-01-01 DIAGNOSIS — I21.4 NSTEMI (NON-ST ELEVATED MYOCARDIAL INFARCTION): Primary | ICD-10-CM

## 2023-01-01 DIAGNOSIS — I25.119 CORONARY ARTERY DISEASE INVOLVING NATIVE HEART WITH ANGINA PECTORIS, UNSPECIFIED VESSEL OR LESION TYPE: Primary | ICD-10-CM

## 2023-01-01 LAB
ABO GROUP BLD: NORMAL
ABO GROUP BLD: NORMAL
ALBUMIN SERPL-MCNC: 4 G/DL (ref 3.5–5.2)
ALBUMIN SERPL-MCNC: 4.6 G/DL (ref 3.5–5.2)
ALBUMIN SERPL-MCNC: 4.7 G/DL (ref 3.5–5.2)
ALBUMIN/GLOB SERPL: 1.3 G/DL
ALBUMIN/GLOB SERPL: 1.6 G/DL
ALBUMIN/GLOB SERPL: 1.6 G/DL
ALP SERPL-CCNC: 79 U/L (ref 39–117)
ALP SERPL-CCNC: 87 U/L (ref 39–117)
ALP SERPL-CCNC: 90 U/L (ref 39–117)
ALT SERPL W P-5'-P-CCNC: 18 U/L (ref 1–41)
ALT SERPL W P-5'-P-CCNC: 25 U/L (ref 1–41)
ALT SERPL W P-5'-P-CCNC: 25 U/L (ref 1–41)
ANION GAP SERPL CALCULATED.3IONS-SCNC: 10 MMOL/L (ref 5–15)
ANION GAP SERPL CALCULATED.3IONS-SCNC: 12.2 MMOL/L (ref 5–15)
ANION GAP SERPL CALCULATED.3IONS-SCNC: 13.6 MMOL/L (ref 5–15)
ANION GAP SERPL CALCULATED.3IONS-SCNC: 14 MMOL/L (ref 5–15)
APTT PPP: 29.8 SECONDS (ref 26.5–34.5)
APTT PPP: 36 SECONDS (ref 22–39)
APTT PPP: 53.5 SECONDS (ref 60–90)
AST SERPL-CCNC: 54 U/L (ref 1–40)
AST SERPL-CCNC: 73 U/L (ref 1–40)
AST SERPL-CCNC: 88 U/L (ref 1–40)
BACTERIA UR QL AUTO: ABNORMAL /HPF
BASOPHILS # BLD AUTO: 0.02 10*3/MM3 (ref 0–0.2)
BASOPHILS # BLD AUTO: 0.03 10*3/MM3 (ref 0–0.2)
BASOPHILS # BLD AUTO: 0.04 10*3/MM3 (ref 0–0.2)
BASOPHILS # BLD AUTO: 0.04 10*3/MM3 (ref 0–0.2)
BASOPHILS # BLD AUTO: 0.05 10*3/MM3 (ref 0–0.2)
BASOPHILS NFR BLD AUTO: 0.2 % (ref 0–1.5)
BASOPHILS NFR BLD AUTO: 0.3 % (ref 0–1.5)
BH CV ECHO MEAS - ACS: 1.7 CM
BH CV ECHO MEAS - AO MAX PG: 6.5 MMHG
BH CV ECHO MEAS - AO MEAN PG: 4 MMHG
BH CV ECHO MEAS - AO ROOT DIAM: 3.9 CM
BH CV ECHO MEAS - AO V2 MAX: 127 CM/SEC
BH CV ECHO MEAS - AO V2 VTI: 21.9 CM
BH CV ECHO MEAS - EDV(CUBED): 80.9 ML
BH CV ECHO MEAS - EDV(MOD-SP4): 68.9 ML
BH CV ECHO MEAS - EF(MOD-BP): 61 %
BH CV ECHO MEAS - EF(MOD-SP4): 61.4 %
BH CV ECHO MEAS - ESV(CUBED): 37.8 ML
BH CV ECHO MEAS - ESV(MOD-SP4): 26.6 ML
BH CV ECHO MEAS - FS: 22.4 %
BH CV ECHO MEAS - IVS/LVPW: 1.04 CM
BH CV ECHO MEAS - IVSD: 1.04 CM
BH CV ECHO MEAS - LAT PEAK E' VEL: 4.4 CM/SEC
BH CV ECHO MEAS - LV DIASTOLIC VOL/BSA (35-75): 33 CM2
BH CV ECHO MEAS - LV MASS(C)D: 146.7 GRAMS
BH CV ECHO MEAS - LV SYSTOLIC VOL/BSA (12-30): 12.8 CM2
BH CV ECHO MEAS - LVIDD: 4.3 CM
BH CV ECHO MEAS - LVIDS: 3.4 CM
BH CV ECHO MEAS - LVPWD: 0.99 CM
BH CV ECHO MEAS - MED PEAK E' VEL: 3.5 CM/SEC
BH CV ECHO MEAS - MV A MAX VEL: 100 CM/SEC
BH CV ECHO MEAS - MV DEC SLOPE: 412 CM/SEC2
BH CV ECHO MEAS - MV DEC TIME: 0.2 SEC
BH CV ECHO MEAS - MV E MAX VEL: 82.6 CM/SEC
BH CV ECHO MEAS - MV E/A: 0.83
BH CV ECHO MEAS - PA ACC TIME: 0.11 SEC
BH CV ECHO MEAS - SI(MOD-SP4): 20.3 ML/M2
BH CV ECHO MEAS - SV(MOD-SP4): 42.3 ML
BH CV ECHO MEAS - TAPSE (>1.6): 2.44 CM
BH CV ECHO MEASUREMENTS AVERAGE E/E' RATIO: 20.91
BH CV XLRA MEAS LEFT DIST CCA EDV: 22.5 CM/SEC
BH CV XLRA MEAS LEFT DIST CCA PSV: 52.7 CM/SEC
BH CV XLRA MEAS LEFT DIST ICA EDV: 22 CM/SEC
BH CV XLRA MEAS LEFT DIST ICA PSV: 39 CM/SEC
BH CV XLRA MEAS LEFT ICA/CCA RATIO: 0.9
BH CV XLRA MEAS LEFT MID CCA EDV: 16.5 CM/SEC
BH CV XLRA MEAS LEFT MID CCA PSV: 65.9 CM/SEC
BH CV XLRA MEAS LEFT MID ICA EDV: 26.9 CM/SEC
BH CV XLRA MEAS LEFT MID ICA PSV: 60.9 CM/SEC
BH CV XLRA MEAS LEFT PROX CCA EDV: 31.4 CM/SEC
BH CV XLRA MEAS LEFT PROX CCA PSV: 139 CM/SEC
BH CV XLRA MEAS LEFT PROX ECA EDV: 21.4 CM/SEC
BH CV XLRA MEAS LEFT PROX ECA PSV: 83.4 CM/SEC
BH CV XLRA MEAS LEFT PROX ICA EDV: 30.2 CM/SEC
BH CV XLRA MEAS LEFT PROX ICA PSV: 57.1 CM/SEC
BH CV XLRA MEAS LEFT PROX SCLA PSV: 91.3 CM/SEC
BH CV XLRA MEAS LEFT VERTEBRAL A EDV: 25.2 CM/SEC
BH CV XLRA MEAS LEFT VERTEBRAL A PSV: 44.5 CM/SEC
BH CV XLRA MEAS RIGHT DIST CCA EDV: 19.3 CM/SEC
BH CV XLRA MEAS RIGHT DIST CCA PSV: 57.1 CM/SEC
BH CV XLRA MEAS RIGHT DIST ICA EDV: 22 CM/SEC
BH CV XLRA MEAS RIGHT DIST ICA PSV: 53.7 CM/SEC
BH CV XLRA MEAS RIGHT ICA/CCA RATIO: 1.4
BH CV XLRA MEAS RIGHT MID CCA EDV: 21.7 CM/SEC
BH CV XLRA MEAS RIGHT MID CCA PSV: 72.1 CM/SEC
BH CV XLRA MEAS RIGHT MID ICA EDV: 38.1 CM/SEC
BH CV XLRA MEAS RIGHT MID ICA PSV: 101 CM/SEC
BH CV XLRA MEAS RIGHT PROX CCA EDV: 19.9 CM/SEC
BH CV XLRA MEAS RIGHT PROX CCA PSV: 86.4 CM/SEC
BH CV XLRA MEAS RIGHT PROX ECA EDV: 25.5 CM/SEC
BH CV XLRA MEAS RIGHT PROX ECA PSV: 115 CM/SEC
BH CV XLRA MEAS RIGHT PROX ICA EDV: 10 CM/SEC
BH CV XLRA MEAS RIGHT PROX ICA PSV: 53.8 CM/SEC
BH CV XLRA MEAS RIGHT PROX SCLA PSV: 101 CM/SEC
BH CV XLRA MEAS RIGHT VERTEBRAL A EDV: 21.1 CM/SEC
BH CV XLRA MEAS RIGHT VERTEBRAL A PSV: 50.9 CM/SEC
BILIRUB SERPL-MCNC: 0.4 MG/DL (ref 0–1.2)
BILIRUB SERPL-MCNC: 0.7 MG/DL (ref 0–1.2)
BILIRUB SERPL-MCNC: 0.9 MG/DL (ref 0–1.2)
BILIRUB UR QL STRIP: NEGATIVE
BLD GP AB SCN SERPL QL: NEGATIVE
BUN SERPL-MCNC: 13 MG/DL (ref 6–20)
BUN SERPL-MCNC: 13 MG/DL (ref 6–20)
BUN SERPL-MCNC: 14 MG/DL (ref 6–20)
BUN SERPL-MCNC: 20 MG/DL (ref 6–20)
BUN/CREAT SERPL: 10.7 (ref 7–25)
BUN/CREAT SERPL: 11 (ref 7–25)
BUN/CREAT SERPL: 12.4 (ref 7–25)
BUN/CREAT SERPL: 19.8 (ref 7–25)
CALCIUM SPEC-SCNC: 9 MG/DL (ref 8.6–10.5)
CALCIUM SPEC-SCNC: 9.1 MG/DL (ref 8.6–10.5)
CALCIUM SPEC-SCNC: 9.6 MG/DL (ref 8.6–10.5)
CALCIUM SPEC-SCNC: 9.9 MG/DL (ref 8.6–10.5)
CHLORIDE SERPL-SCNC: 97 MMOL/L (ref 98–107)
CHLORIDE SERPL-SCNC: 98 MMOL/L (ref 98–107)
CHLORIDE SERPL-SCNC: 99 MMOL/L (ref 98–107)
CHLORIDE SERPL-SCNC: 99 MMOL/L (ref 98–107)
CHOLEST SERPL-MCNC: 260 MG/DL (ref 0–200)
CLARITY UR: CLEAR
CO2 SERPL-SCNC: 21.8 MMOL/L (ref 22–29)
CO2 SERPL-SCNC: 27 MMOL/L (ref 22–29)
CO2 SERPL-SCNC: 27.4 MMOL/L (ref 22–29)
CO2 SERPL-SCNC: 28 MMOL/L (ref 22–29)
COLOR UR: YELLOW
CREAT SERPL-MCNC: 1.01 MG/DL (ref 0.76–1.27)
CREAT SERPL-MCNC: 1.13 MG/DL (ref 0.76–1.27)
CREAT SERPL-MCNC: 1.18 MG/DL (ref 0.76–1.27)
CREAT SERPL-MCNC: 1.21 MG/DL (ref 0.76–1.27)
CRP SERPL-MCNC: 1.33 MG/DL (ref 0–0.5)
D DIMER PPP FEU-MCNC: 0.28 MCGFEU/ML (ref 0–0.55)
D-LACTATE SERPL-SCNC: 2 MMOL/L (ref 0.5–2)
D-LACTATE SERPL-SCNC: 2.1 MMOL/L (ref 0.5–2)
D-LACTATE SERPL-SCNC: 2.1 MMOL/L (ref 0.5–2)
DEPRECATED RDW RBC AUTO: 43.8 FL (ref 37–54)
DEPRECATED RDW RBC AUTO: 45.6 FL (ref 37–54)
DEPRECATED RDW RBC AUTO: 46.2 FL (ref 37–54)
DEPRECATED RDW RBC AUTO: 46.5 FL (ref 37–54)
DEPRECATED RDW RBC AUTO: 47 FL (ref 37–54)
DEPRECATED RDW RBC AUTO: 53.6 FL (ref 37–54)
EGFRCR SERPLBLD CKD-EPI 2021: 70.7 ML/MIN/1.73
EGFRCR SERPLBLD CKD-EPI 2021: 72.9 ML/MIN/1.73
EGFRCR SERPLBLD CKD-EPI 2021: 76.8 ML/MIN/1.73
EGFRCR SERPLBLD CKD-EPI 2021: 87.8 ML/MIN/1.73
EOSINOPHIL # BLD AUTO: 0.03 10*3/MM3 (ref 0–0.4)
EOSINOPHIL # BLD AUTO: 0.09 10*3/MM3 (ref 0–0.4)
EOSINOPHIL # BLD AUTO: 0.15 10*3/MM3 (ref 0–0.4)
EOSINOPHIL NFR BLD AUTO: 0.2 % (ref 0.3–6.2)
EOSINOPHIL NFR BLD AUTO: 0.6 % (ref 0.3–6.2)
EOSINOPHIL NFR BLD AUTO: 0.7 % (ref 0.3–6.2)
EOSINOPHIL NFR BLD AUTO: 0.7 % (ref 0.3–6.2)
EOSINOPHIL NFR BLD AUTO: 2 % (ref 0.3–6.2)
ERYTHROCYTE [DISTWIDTH] IN BLOOD BY AUTOMATED COUNT: 12.1 % (ref 12.3–15.4)
ERYTHROCYTE [DISTWIDTH] IN BLOOD BY AUTOMATED COUNT: 12.4 % (ref 12.3–15.4)
ERYTHROCYTE [DISTWIDTH] IN BLOOD BY AUTOMATED COUNT: 12.5 % (ref 12.3–15.4)
ERYTHROCYTE [DISTWIDTH] IN BLOOD BY AUTOMATED COUNT: 12.6 % (ref 12.3–15.4)
ERYTHROCYTE [DISTWIDTH] IN BLOOD BY AUTOMATED COUNT: 12.6 % (ref 12.3–15.4)
ERYTHROCYTE [DISTWIDTH] IN BLOOD BY AUTOMATED COUNT: 14.8 % (ref 12.3–15.4)
FIBRINOGEN PPP-MCNC: 276 MG/DL (ref 203–470)
GEN 5 2HR TROPONIN T REFLEX: 454 NG/L
GEN 5 2HR TROPONIN T REFLEX: 830 NG/L
GLOBULIN UR ELPH-MCNC: 2.8 GM/DL
GLOBULIN UR ELPH-MCNC: 3 GM/DL
GLOBULIN UR ELPH-MCNC: 3.1 GM/DL
GLUCOSE SERPL-MCNC: 115 MG/DL (ref 65–99)
GLUCOSE SERPL-MCNC: 117 MG/DL (ref 65–99)
GLUCOSE SERPL-MCNC: 118 MG/DL (ref 65–99)
GLUCOSE SERPL-MCNC: 135 MG/DL (ref 65–99)
GLUCOSE UR STRIP-MCNC: NEGATIVE MG/DL
HBA1C MFR BLD: 5.8 % (ref 4.8–5.6)
HCT VFR BLD AUTO: 36 % (ref 37.5–51)
HCT VFR BLD AUTO: 48.6 % (ref 37.5–51)
HCT VFR BLD AUTO: 51.1 % (ref 37.5–51)
HCT VFR BLD AUTO: 52.3 % (ref 37.5–51)
HCT VFR BLD AUTO: 56.5 % (ref 37.5–51)
HCT VFR BLD AUTO: 57 % (ref 37.5–51)
HDLC SERPL-MCNC: 51 MG/DL (ref 40–60)
HGB BLD-MCNC: 11.7 G/DL (ref 13–17.7)
HGB BLD-MCNC: 16.1 G/DL (ref 13–17.7)
HGB BLD-MCNC: 16.5 G/DL (ref 13–17.7)
HGB BLD-MCNC: 17.2 G/DL (ref 13–17.7)
HGB BLD-MCNC: 18.5 G/DL (ref 13–17.7)
HGB BLD-MCNC: 18.6 G/DL (ref 13–17.7)
HGB UR QL STRIP.AUTO: ABNORMAL
HYALINE CASTS UR QL AUTO: ABNORMAL /LPF
IMM GRANULOCYTES # BLD AUTO: 0.03 10*3/MM3 (ref 0–0.05)
IMM GRANULOCYTES # BLD AUTO: 0.04 10*3/MM3 (ref 0–0.05)
IMM GRANULOCYTES # BLD AUTO: 0.05 10*3/MM3 (ref 0–0.05)
IMM GRANULOCYTES # BLD AUTO: 0.06 10*3/MM3 (ref 0–0.05)
IMM GRANULOCYTES # BLD AUTO: 0.07 10*3/MM3 (ref 0–0.05)
IMM GRANULOCYTES NFR BLD AUTO: 0.3 % (ref 0–0.5)
IMM GRANULOCYTES NFR BLD AUTO: 0.3 % (ref 0–0.5)
IMM GRANULOCYTES NFR BLD AUTO: 0.4 % (ref 0–0.5)
IMM GRANULOCYTES NFR BLD AUTO: 0.4 % (ref 0–0.5)
IMM GRANULOCYTES NFR BLD AUTO: 0.5 % (ref 0–0.5)
INR PPP: 0.97 (ref 0.89–1.12)
INR PPP: 0.97 (ref 0.9–1.1)
INR PPP: 1.48 (ref 0.89–1.12)
KETONES UR QL STRIP: ABNORMAL
LDLC SERPL CALC-MCNC: 171 MG/DL (ref 0–100)
LDLC/HDLC SERPL: 3.29 {RATIO}
LEFT ARM BP: NORMAL MMHG
LEFT ATRIUM VOLUME INDEX: 28.3 ML/M2
LEUKOCYTE ESTERASE UR QL STRIP.AUTO: NEGATIVE
LIPASE SERPL-CCNC: 22 U/L (ref 13–60)
LYMPHOCYTES # BLD AUTO: 1.78 10*3/MM3 (ref 0.7–3.1)
LYMPHOCYTES # BLD AUTO: 2.31 10*3/MM3 (ref 0.7–3.1)
LYMPHOCYTES # BLD AUTO: 2.32 10*3/MM3 (ref 0.7–3.1)
LYMPHOCYTES # BLD AUTO: 2.37 10*3/MM3 (ref 0.7–3.1)
LYMPHOCYTES # BLD AUTO: 2.42 10*3/MM3 (ref 0.7–3.1)
LYMPHOCYTES NFR BLD AUTO: 15.4 % (ref 19.6–45.3)
LYMPHOCYTES NFR BLD AUTO: 15.5 % (ref 19.6–45.3)
LYMPHOCYTES NFR BLD AUTO: 17.2 % (ref 19.6–45.3)
LYMPHOCYTES NFR BLD AUTO: 17.6 % (ref 19.6–45.3)
LYMPHOCYTES NFR BLD AUTO: 23.3 % (ref 19.6–45.3)
MAGNESIUM SERPL-MCNC: 2 MG/DL (ref 1.6–2.6)
MAGNESIUM SERPL-MCNC: 2 MG/DL (ref 1.6–2.6)
MCH RBC QN AUTO: 31.9 PG (ref 26.6–33)
MCH RBC QN AUTO: 32.2 PG (ref 26.6–33)
MCH RBC QN AUTO: 32.4 PG (ref 26.6–33)
MCH RBC QN AUTO: 32.5 PG (ref 26.6–33)
MCHC RBC AUTO-ENTMCNC: 32.3 G/DL (ref 31.5–35.7)
MCHC RBC AUTO-ENTMCNC: 32.5 G/DL (ref 31.5–35.7)
MCHC RBC AUTO-ENTMCNC: 32.5 G/DL (ref 31.5–35.7)
MCHC RBC AUTO-ENTMCNC: 32.9 G/DL (ref 31.5–35.7)
MCHC RBC AUTO-ENTMCNC: 32.9 G/DL (ref 31.5–35.7)
MCHC RBC AUTO-ENTMCNC: 33.1 G/DL (ref 31.5–35.7)
MCV RBC AUTO: 100 FL (ref 79–97)
MCV RBC AUTO: 98 FL (ref 79–97)
MCV RBC AUTO: 98.1 FL (ref 79–97)
MCV RBC AUTO: 98.4 FL (ref 79–97)
MCV RBC AUTO: 98.7 FL (ref 79–97)
MCV RBC AUTO: 99.8 FL (ref 79–97)
MONOCYTES # BLD AUTO: 0.75 10*3/MM3 (ref 0.1–0.9)
MONOCYTES # BLD AUTO: 0.98 10*3/MM3 (ref 0.1–0.9)
MONOCYTES # BLD AUTO: 1.07 10*3/MM3 (ref 0.1–0.9)
MONOCYTES # BLD AUTO: 1.08 10*3/MM3 (ref 0.1–0.9)
MONOCYTES # BLD AUTO: 1.49 10*3/MM3 (ref 0.1–0.9)
MONOCYTES NFR BLD AUTO: 7.1 % (ref 5–12)
MONOCYTES NFR BLD AUTO: 7.1 % (ref 5–12)
MONOCYTES NFR BLD AUTO: 8 % (ref 5–12)
MONOCYTES NFR BLD AUTO: 9.8 % (ref 5–12)
MONOCYTES NFR BLD AUTO: 9.9 % (ref 5–12)
NEUTROPHILS NFR BLD AUTO: 10.14 10*3/MM3 (ref 1.7–7)
NEUTROPHILS NFR BLD AUTO: 11.14 10*3/MM3 (ref 1.7–7)
NEUTROPHILS NFR BLD AUTO: 11.64 10*3/MM3 (ref 1.7–7)
NEUTROPHILS NFR BLD AUTO: 4.92 10*3/MM3 (ref 1.7–7)
NEUTROPHILS NFR BLD AUTO: 64.2 % (ref 42.7–76)
NEUTROPHILS NFR BLD AUTO: 73.6 % (ref 42.7–76)
NEUTROPHILS NFR BLD AUTO: 73.7 % (ref 42.7–76)
NEUTROPHILS NFR BLD AUTO: 73.9 % (ref 42.7–76)
NEUTROPHILS NFR BLD AUTO: 76.2 % (ref 42.7–76)
NEUTROPHILS NFR BLD AUTO: 9.89 10*3/MM3 (ref 1.7–7)
NITRITE UR QL STRIP: NEGATIVE
NRBC BLD AUTO-RTO: 0 /100 WBC (ref 0–0.2)
NT-PROBNP SERPL-MCNC: 1202 PG/ML (ref 0–900)
PA ADP PRP-ACNC: 131 PRU
PA ADP PRP-ACNC: 135 PRU
PA ADP PRP-ACNC: 146 PRU
PA ADP PRP-ACNC: 159 PRU
PH UR STRIP.AUTO: 6.5 [PH] (ref 5–8)
PLATELET # BLD AUTO: 121 10*3/MM3 (ref 140–450)
PLATELET # BLD AUTO: 154 10*3/MM3 (ref 140–450)
PLATELET # BLD AUTO: 177 10*3/MM3 (ref 140–450)
PLATELET # BLD AUTO: 196 10*3/MM3 (ref 140–450)
PLATELET # BLD AUTO: 204 10*3/MM3 (ref 140–450)
PLATELET # BLD AUTO: 238 10*3/MM3 (ref 140–450)
PMV BLD AUTO: 10.5 FL (ref 6–12)
PMV BLD AUTO: 10.9 FL (ref 6–12)
PMV BLD AUTO: 11 FL (ref 6–12)
PMV BLD AUTO: 11.3 FL (ref 6–12)
PMV BLD AUTO: 11.3 FL (ref 6–12)
PMV BLD AUTO: 11.5 FL (ref 6–12)
POTASSIUM SERPL-SCNC: 3.6 MMOL/L (ref 3.5–5.2)
POTASSIUM SERPL-SCNC: 3.8 MMOL/L (ref 3.5–5.2)
POTASSIUM SERPL-SCNC: 3.9 MMOL/L (ref 3.5–5.2)
POTASSIUM SERPL-SCNC: 4.1 MMOL/L (ref 3.5–5.2)
POTASSIUM SERPL-SCNC: 4.2 MMOL/L (ref 3.5–5.2)
PROCALCITONIN SERPL-MCNC: 0.07 NG/ML (ref 0–0.25)
PROT SERPL-MCNC: 7.1 G/DL (ref 6–8.5)
PROT SERPL-MCNC: 7.4 G/DL (ref 6–8.5)
PROT SERPL-MCNC: 7.7 G/DL (ref 6–8.5)
PROT UR QL STRIP: ABNORMAL
PROTHROMBIN TIME: 13 SECONDS (ref 12.2–14.5)
PROTHROMBIN TIME: 13.4 SECONDS (ref 12.1–14.7)
PROTHROMBIN TIME: 18 SECONDS (ref 12.2–14.5)
QT INTERVAL: 368 MS
QT INTERVAL: 374 MS
QT INTERVAL: 384 MS
QTC INTERVAL: 427 MS
QTC INTERVAL: 465 MS
QTC INTERVAL: 487 MS
RBC # BLD AUTO: 3.67 10*6/MM3 (ref 4.14–5.8)
RBC # BLD AUTO: 4.96 10*6/MM3 (ref 4.14–5.8)
RBC # BLD AUTO: 5.12 10*6/MM3 (ref 4.14–5.8)
RBC # BLD AUTO: 5.3 10*6/MM3 (ref 4.14–5.8)
RBC # BLD AUTO: 5.7 10*6/MM3 (ref 4.14–5.8)
RBC # BLD AUTO: 5.74 10*6/MM3 (ref 4.14–5.8)
RBC # UR STRIP: ABNORMAL /HPF
RBC MORPH BLD: NORMAL
REF LAB TEST METHOD: ABNORMAL
RH BLD: POSITIVE
RH BLD: POSITIVE
RIGHT ARM BP: NORMAL MMHG
SMALL PLATELETS BLD QL SMEAR: ADEQUATE
SODIUM SERPL-SCNC: 133 MMOL/L (ref 136–145)
SODIUM SERPL-SCNC: 136 MMOL/L (ref 136–145)
SODIUM SERPL-SCNC: 139 MMOL/L (ref 136–145)
SODIUM SERPL-SCNC: 139 MMOL/L (ref 136–145)
SP GR UR STRIP: 1.02 (ref 1–1.03)
SQUAMOUS #/AREA URNS HPF: ABNORMAL /HPF
T&S EXPIRATION DATE: NORMAL
TRIGL SERPL-MCNC: 207 MG/DL (ref 0–150)
TROPONIN T DELTA: 25 NG/L
TROPONIN T DELTA: 47 NG/L
TROPONIN T SERPL HS-MCNC: 429 NG/L
TROPONIN T SERPL HS-MCNC: 783 NG/L
UFH PPP CHRO-ACNC: 0.1 IU/ML (ref 0.3–0.7)
UFH PPP CHRO-ACNC: 0.11 IU/ML (ref 0.3–0.7)
UFH PPP CHRO-ACNC: 0.13 IU/ML (ref 0.3–0.7)
UFH PPP CHRO-ACNC: 0.17 IU/ML (ref 0.3–0.7)
UFH PPP CHRO-ACNC: 0.21 IU/ML (ref 0.3–0.7)
UFH PPP CHRO-ACNC: 0.26 IU/ML (ref 0.3–0.7)
UFH PPP CHRO-ACNC: 0.29 IU/ML (ref 0.3–0.7)
UFH PPP CHRO-ACNC: 0.45 IU/ML (ref 0.3–0.7)
UFH PPP CHRO-ACNC: <0.1 IU/ML (ref 0.3–0.7)
UROBILINOGEN UR QL STRIP: ABNORMAL
VLDLC SERPL-MCNC: 38 MG/DL (ref 5–40)
WBC # UR STRIP: ABNORMAL /HPF
WBC NRBC COR # BLD: 13.43 10*3/MM3 (ref 3.4–10.8)
WBC NRBC COR # BLD: 13.73 10*3/MM3 (ref 3.4–10.8)
WBC NRBC COR # BLD: 15.09 10*3/MM3 (ref 3.4–10.8)
WBC NRBC COR # BLD: 15.28 10*3/MM3 (ref 3.4–10.8)
WBC NRBC COR # BLD: 24.78 10*3/MM3 (ref 3.4–10.8)
WBC NRBC COR # BLD: 7.65 10*3/MM3 (ref 3.4–10.8)

## 2023-01-01 PROCEDURE — 25010000002 HEPARIN (PORCINE) 25000-0.45 UT/250ML-% SOLUTION: Performed by: STUDENT IN AN ORGANIZED HEALTH CARE EDUCATION/TRAINING PROGRAM

## 2023-01-01 PROCEDURE — 80048 BASIC METABOLIC PNL TOTAL CA: CPT | Performed by: PHYSICIAN ASSISTANT

## 2023-01-01 PROCEDURE — 93454 CORONARY ARTERY ANGIO S&I: CPT | Performed by: INTERNAL MEDICINE

## 2023-01-01 PROCEDURE — 93318 ECHO TRANSESOPHAGEAL INTRAOP: CPT | Performed by: ANESTHESIOLOGY

## 2023-01-01 PROCEDURE — 82803 BLOOD GASES ANY COMBINATION: CPT

## 2023-01-01 PROCEDURE — P9016 RBC LEUKOCYTES REDUCED: HCPCS

## 2023-01-01 PROCEDURE — 85347 COAGULATION TIME ACTIVATED: CPT

## 2023-01-01 PROCEDURE — 99285 EMERGENCY DEPT VISIT HI MDM: CPT

## 2023-01-01 PROCEDURE — P9100 PATHOGEN TEST FOR PLATELETS: HCPCS

## 2023-01-01 PROCEDURE — 25010000002 HEPARIN (PORCINE) PER 1000 UNITS

## 2023-01-01 PROCEDURE — 71045 X-RAY EXAM CHEST 1 VIEW: CPT

## 2023-01-01 PROCEDURE — 85520 HEPARIN ASSAY: CPT | Performed by: INTERNAL MEDICINE

## 2023-01-01 PROCEDURE — 84132 ASSAY OF SERUM POTASSIUM: CPT | Performed by: INTERNAL MEDICINE

## 2023-01-01 PROCEDURE — 94799 UNLISTED PULMONARY SVC/PX: CPT

## 2023-01-01 PROCEDURE — 86901 BLOOD TYPING SEROLOGIC RH(D): CPT

## 2023-01-01 PROCEDURE — 63710000001 PROMETHAZINE PER 25 MG: Performed by: PHYSICIAN ASSISTANT

## 2023-01-01 PROCEDURE — 94664 DEMO&/EVAL PT USE INHALER: CPT

## 2023-01-01 PROCEDURE — 25010000002 MORPHINE PER 10 MG: Performed by: THORACIC SURGERY (CARDIOTHORACIC VASCULAR SURGERY)

## 2023-01-01 PROCEDURE — 99223 1ST HOSP IP/OBS HIGH 75: CPT | Performed by: HOSPITALIST

## 2023-01-01 PROCEDURE — A4648 IMPLANTABLE TISSUE MARKER: HCPCS | Performed by: THORACIC SURGERY (CARDIOTHORACIC VASCULAR SURGERY)

## 2023-01-01 PROCEDURE — 25010000002 NITROGLYCERIN 200 MCG/ML SOLUTION: Performed by: HOSPITALIST

## 2023-01-01 PROCEDURE — 25010000002 HEPARIN (PORCINE) PER 1000 UNITS: Performed by: THORACIC SURGERY (CARDIOTHORACIC VASCULAR SURGERY)

## 2023-01-01 PROCEDURE — 86900 BLOOD TYPING SEROLOGIC ABO: CPT

## 2023-01-01 PROCEDURE — 5A1221Z PERFORMANCE OF CARDIAC OUTPUT, CONTINUOUS: ICD-10-PCS | Performed by: THORACIC SURGERY (CARDIOTHORACIC VASCULAR SURGERY)

## 2023-01-01 PROCEDURE — 021209W BYPASS CORONARY ARTERY, THREE ARTERIES FROM AORTA WITH AUTOLOGOUS VENOUS TISSUE, OPEN APPROACH: ICD-10-PCS | Performed by: THORACIC SURGERY (CARDIOTHORACIC VASCULAR SURGERY)

## 2023-01-01 PROCEDURE — 99222 1ST HOSP IP/OBS MODERATE 55: CPT | Performed by: NURSE PRACTITIONER

## 2023-01-01 PROCEDURE — C1760 CLOSURE DEV, VASC: HCPCS | Performed by: INTERNAL MEDICINE

## 2023-01-01 PROCEDURE — 25010000002 HEPARIN (PORCINE) 25000-0.45 UT/250ML-% SOLUTION

## 2023-01-01 PROCEDURE — 84484 ASSAY OF TROPONIN QUANT: CPT | Performed by: STUDENT IN AN ORGANIZED HEALTH CARE EDUCATION/TRAINING PROGRAM

## 2023-01-01 PROCEDURE — 4A023N7 MEASUREMENT OF CARDIAC SAMPLING AND PRESSURE, LEFT HEART, PERCUTANEOUS APPROACH: ICD-10-PCS | Performed by: INTERNAL MEDICINE

## 2023-01-01 PROCEDURE — C1725 CATH, TRANSLUMIN NON-LASER: HCPCS | Performed by: INTERNAL MEDICINE

## 2023-01-01 PROCEDURE — 93010 ELECTROCARDIOGRAM REPORT: CPT | Performed by: INTERNAL MEDICINE

## 2023-01-01 PROCEDURE — 33519 CABG ARTERY-VEIN THREE: CPT | Performed by: THORACIC SURGERY (CARDIOTHORACIC VASCULAR SURGERY)

## 2023-01-01 PROCEDURE — 86850 RBC ANTIBODY SCREEN: CPT | Performed by: PHYSICIAN ASSISTANT

## 2023-01-01 PROCEDURE — 83735 ASSAY OF MAGNESIUM: CPT | Performed by: INTERNAL MEDICINE

## 2023-01-01 PROCEDURE — 94660 CPAP INITIATION&MGMT: CPT

## 2023-01-01 PROCEDURE — 85576 BLOOD PLATELET AGGREGATION: CPT | Performed by: PHYSICIAN ASSISTANT

## 2023-01-01 PROCEDURE — 94761 N-INVAS EAR/PLS OXIMETRY MLT: CPT

## 2023-01-01 PROCEDURE — 85520 HEPARIN ASSAY: CPT

## 2023-01-01 PROCEDURE — B24BZZ4 ULTRASONOGRAPHY OF HEART WITH AORTA, TRANSESOPHAGEAL: ICD-10-PCS | Performed by: THORACIC SURGERY (CARDIOTHORACIC VASCULAR SURGERY)

## 2023-01-01 PROCEDURE — 93880 EXTRACRANIAL BILAT STUDY: CPT | Performed by: INTERNAL MEDICINE

## 2023-01-01 PROCEDURE — 3E080GC INTRODUCTION OF OTHER THERAPEUTIC SUBSTANCE INTO HEART, OPEN APPROACH: ICD-10-PCS | Performed by: THORACIC SURGERY (CARDIOTHORACIC VASCULAR SURGERY)

## 2023-01-01 PROCEDURE — 85730 THROMBOPLASTIN TIME PARTIAL: CPT | Performed by: THORACIC SURGERY (CARDIOTHORACIC VASCULAR SURGERY)

## 2023-01-01 PROCEDURE — C1768 GRAFT, VASCULAR: HCPCS | Performed by: THORACIC SURGERY (CARDIOTHORACIC VASCULAR SURGERY)

## 2023-01-01 PROCEDURE — 02PY0JZ REMOVAL OF SYNTHETIC SUBSTITUTE FROM GREAT VESSEL, OPEN APPROACH: ICD-10-PCS | Performed by: THORACIC SURGERY (CARDIOTHORACIC VASCULAR SURGERY)

## 2023-01-01 PROCEDURE — 84484 ASSAY OF TROPONIN QUANT: CPT | Performed by: INTERNAL MEDICINE

## 2023-01-01 PROCEDURE — 82330 ASSAY OF CALCIUM: CPT

## 2023-01-01 PROCEDURE — 02VX0DZ RESTRICTION OF THORACIC AORTA, ASCENDING/ARCH WITH INTRALUMINAL DEVICE, OPEN APPROACH: ICD-10-PCS | Performed by: THORACIC SURGERY (CARDIOTHORACIC VASCULAR SURGERY)

## 2023-01-01 PROCEDURE — 25810000003 SODIUM CHLORIDE PER 500 ML: Performed by: THORACIC SURGERY (CARDIOTHORACIC VASCULAR SURGERY)

## 2023-01-01 PROCEDURE — 80053 COMPREHEN METABOLIC PANEL: CPT | Performed by: HOSPITALIST

## 2023-01-01 PROCEDURE — 85025 COMPLETE CBC W/AUTO DIFF WBC: CPT | Performed by: PHYSICIAN ASSISTANT

## 2023-01-01 PROCEDURE — 99221 1ST HOSP IP/OBS SF/LOW 40: CPT | Performed by: NURSE PRACTITIONER

## 2023-01-01 PROCEDURE — 86901 BLOOD TYPING SEROLOGIC RH(D): CPT | Performed by: PHYSICIAN ASSISTANT

## 2023-01-01 PROCEDURE — 93880 EXTRACRANIAL BILAT STUDY: CPT

## 2023-01-01 PROCEDURE — 25010000002 HEPARIN (PORCINE) PER 1000 UNITS: Performed by: INTERNAL MEDICINE

## 2023-01-01 PROCEDURE — 25010000002 CEFAZOLIN PER 500 MG: Performed by: PHYSICIAN ASSISTANT

## 2023-01-01 PROCEDURE — 85014 HEMATOCRIT: CPT

## 2023-01-01 PROCEDURE — 85610 PROTHROMBIN TIME: CPT | Performed by: PHYSICIAN ASSISTANT

## 2023-01-01 PROCEDURE — 25010000002 MIDAZOLAM PER 1 MG: Performed by: INTERNAL MEDICINE

## 2023-01-01 PROCEDURE — 85730 THROMBOPLASTIN TIME PARTIAL: CPT | Performed by: INTERNAL MEDICINE

## 2023-01-01 PROCEDURE — 93005 ELECTROCARDIOGRAM TRACING: CPT | Performed by: STUDENT IN AN ORGANIZED HEALTH CARE EDUCATION/TRAINING PROGRAM

## 2023-01-01 PROCEDURE — 99232 SBSQ HOSP IP/OBS MODERATE 35: CPT | Performed by: STUDENT IN AN ORGANIZED HEALTH CARE EDUCATION/TRAINING PROGRAM

## 2023-01-01 PROCEDURE — 02QX0ZZ REPAIR THORACIC AORTA, ASCENDING/ARCH, OPEN APPROACH: ICD-10-PCS | Performed by: THORACIC SURGERY (CARDIOTHORACIC VASCULAR SURGERY)

## 2023-01-01 PROCEDURE — 85520 HEPARIN ASSAY: CPT | Performed by: PHYSICIAN ASSISTANT

## 2023-01-01 PROCEDURE — 25010000002 MORPHINE PER 10 MG: Performed by: PEDIATRICS

## 2023-01-01 PROCEDURE — 97162 PT EVAL MOD COMPLEX 30 MIN: CPT

## 2023-01-01 PROCEDURE — 02QA0ZZ REPAIR HEART, OPEN APPROACH: ICD-10-PCS | Performed by: THORACIC SURGERY (CARDIOTHORACIC VASCULAR SURGERY)

## 2023-01-01 PROCEDURE — 86923 COMPATIBILITY TEST ELECTRIC: CPT

## 2023-01-01 PROCEDURE — 85610 PROTHROMBIN TIME: CPT | Performed by: THORACIC SURGERY (CARDIOTHORACIC VASCULAR SURGERY)

## 2023-01-01 PROCEDURE — 88304 TISSUE EXAM BY PATHOLOGIST: CPT | Performed by: THORACIC SURGERY (CARDIOTHORACIC VASCULAR SURGERY)

## 2023-01-01 PROCEDURE — C1769 GUIDE WIRE: HCPCS | Performed by: INTERNAL MEDICINE

## 2023-01-01 PROCEDURE — 25010000002 MIDAZOLAM PER 1 MG: Performed by: ANESTHESIOLOGY

## 2023-01-01 PROCEDURE — 85520 HEPARIN ASSAY: CPT | Performed by: HOSPITALIST

## 2023-01-01 PROCEDURE — 25010000002 MORPHINE PER 10 MG: Performed by: INTERNAL MEDICINE

## 2023-01-01 PROCEDURE — 93005 ELECTROCARDIOGRAM TRACING: CPT | Performed by: PHYSICIAN ASSISTANT

## 2023-01-01 PROCEDURE — 71045 X-RAY EXAM CHEST 1 VIEW: CPT | Performed by: RADIOLOGY

## 2023-01-01 PROCEDURE — 25510000001 IOPAMIDOL PER 1 ML: Performed by: INTERNAL MEDICINE

## 2023-01-01 PROCEDURE — 94640 AIRWAY INHALATION TREATMENT: CPT

## 2023-01-01 PROCEDURE — 81001 URINALYSIS AUTO W/SCOPE: CPT | Performed by: INTERNAL MEDICINE

## 2023-01-01 PROCEDURE — 85025 COMPLETE CBC W/AUTO DIFF WBC: CPT | Performed by: INTERNAL MEDICINE

## 2023-01-01 PROCEDURE — 85520 HEPARIN ASSAY: CPT | Performed by: STUDENT IN AN ORGANIZED HEALTH CARE EDUCATION/TRAINING PROGRAM

## 2023-01-01 PROCEDURE — 25010000002 CEFAZOLIN PER 500 MG: Performed by: THORACIC SURGERY (CARDIOTHORACIC VASCULAR SURGERY)

## 2023-01-01 PROCEDURE — 83880 ASSAY OF NATRIURETIC PEPTIDE: CPT | Performed by: HOSPITALIST

## 2023-01-01 PROCEDURE — 86927 PLASMA FRESH FROZEN: CPT

## 2023-01-01 PROCEDURE — B2111ZZ FLUOROSCOPY OF MULTIPLE CORONARY ARTERIES USING LOW OSMOLAR CONTRAST: ICD-10-PCS | Performed by: INTERNAL MEDICINE

## 2023-01-01 PROCEDURE — 80061 LIPID PANEL: CPT | Performed by: HOSPITALIST

## 2023-01-01 PROCEDURE — 82947 ASSAY GLUCOSE BLOOD QUANT: CPT

## 2023-01-01 PROCEDURE — 25810000003 SODIUM CHLORIDE 0.9 % SOLUTION 250 ML FLEX CONT: Performed by: ANESTHESIOLOGY

## 2023-01-01 PROCEDURE — 86900 BLOOD TYPING SEROLOGIC ABO: CPT | Performed by: PHYSICIAN ASSISTANT

## 2023-01-01 PROCEDURE — C1894 INTRO/SHEATH, NON-LASER: HCPCS | Performed by: INTERNAL MEDICINE

## 2023-01-01 PROCEDURE — P9059 PLASMA, FRZ BETWEEN 8-24HOUR: HCPCS

## 2023-01-01 PROCEDURE — C1751 CATH, INF, PER/CENT/MIDLINE: HCPCS | Performed by: ANESTHESIOLOGY

## 2023-01-01 PROCEDURE — 94010 BREATHING CAPACITY TEST: CPT | Performed by: INTERNAL MEDICINE

## 2023-01-01 PROCEDURE — 84295 ASSAY OF SERUM SODIUM: CPT

## 2023-01-01 PROCEDURE — C1889 IMPLANT/INSERT DEVICE, NOC: HCPCS | Performed by: THORACIC SURGERY (CARDIOTHORACIC VASCULAR SURGERY)

## 2023-01-01 PROCEDURE — 33533 CABG ARTERIAL SINGLE: CPT | Performed by: THORACIC SURGERY (CARDIOTHORACIC VASCULAR SURGERY)

## 2023-01-01 PROCEDURE — 85379 FIBRIN DEGRADATION QUANT: CPT | Performed by: STUDENT IN AN ORGANIZED HEALTH CARE EDUCATION/TRAINING PROGRAM

## 2023-01-01 PROCEDURE — 36415 COLL VENOUS BLD VENIPUNCTURE: CPT

## 2023-01-01 PROCEDURE — 83605 ASSAY OF LACTIC ACID: CPT | Performed by: HOSPITALIST

## 2023-01-01 PROCEDURE — 97535 SELF CARE MNGMENT TRAINING: CPT

## 2023-01-01 PROCEDURE — 80053 COMPREHEN METABOLIC PANEL: CPT | Performed by: INTERNAL MEDICINE

## 2023-01-01 PROCEDURE — 99153 MOD SED SAME PHYS/QHP EA: CPT | Performed by: INTERNAL MEDICINE

## 2023-01-01 PROCEDURE — 02100Z9 BYPASS CORONARY ARTERY, ONE ARTERY FROM LEFT INTERNAL MAMMARY, OPEN APPROACH: ICD-10-PCS | Performed by: THORACIC SURGERY (CARDIOTHORACIC VASCULAR SURGERY)

## 2023-01-01 PROCEDURE — 25010000002 PAPAVERINE PER 60 MG: Performed by: THORACIC SURGERY (CARDIOTHORACIC VASCULAR SURGERY)

## 2023-01-01 PROCEDURE — 25010000002 PROCHLORPERAZINE 10 MG/2ML SOLUTION: Performed by: STUDENT IN AN ORGANIZED HEALTH CARE EDUCATION/TRAINING PROGRAM

## 2023-01-01 PROCEDURE — 80053 COMPREHEN METABOLIC PANEL: CPT | Performed by: STUDENT IN AN ORGANIZED HEALTH CARE EDUCATION/TRAINING PROGRAM

## 2023-01-01 PROCEDURE — 25810000003 LACTATED RINGERS PER 1000 ML: Performed by: ANESTHESIOLOGY

## 2023-01-01 PROCEDURE — 99232 SBSQ HOSP IP/OBS MODERATE 35: CPT | Performed by: NURSE PRACTITIONER

## 2023-01-01 PROCEDURE — 36430 TRANSFUSION BLD/BLD COMPNT: CPT

## 2023-01-01 PROCEDURE — 25010000002 HEPARIN (PORCINE) 25000-0.45 UT/250ML-% SOLUTION: Performed by: INTERNAL MEDICINE

## 2023-01-01 PROCEDURE — 25810000003 SODIUM CHLORIDE 0.9 % SOLUTION: Performed by: INTERNAL MEDICINE

## 2023-01-01 PROCEDURE — 85610 PROTHROMBIN TIME: CPT | Performed by: INTERNAL MEDICINE

## 2023-01-01 PROCEDURE — 85007 BL SMEAR W/DIFF WBC COUNT: CPT | Performed by: INTERNAL MEDICINE

## 2023-01-01 PROCEDURE — 25010000002 PROTAMINE SULFATE PER 10 MG: Performed by: ANESTHESIOLOGY

## 2023-01-01 PROCEDURE — P9035 PLATELET PHERES LEUKOREDUCED: HCPCS

## 2023-01-01 PROCEDURE — C1887 CATHETER, GUIDING: HCPCS | Performed by: INTERNAL MEDICINE

## 2023-01-01 PROCEDURE — 84132 ASSAY OF SERUM POTASSIUM: CPT

## 2023-01-01 PROCEDURE — 25010000002 MORPHINE PER 10 MG: Performed by: STUDENT IN AN ORGANIZED HEALTH CARE EDUCATION/TRAINING PROGRAM

## 2023-01-01 PROCEDURE — 99152 MOD SED SAME PHYS/QHP 5/>YRS: CPT | Performed by: INTERNAL MEDICINE

## 2023-01-01 PROCEDURE — 33508 ENDOSCOPIC VEIN HARVEST: CPT | Performed by: THORACIC SURGERY (CARDIOTHORACIC VASCULAR SURGERY)

## 2023-01-01 PROCEDURE — 94010 BREATHING CAPACITY TEST: CPT

## 2023-01-01 PROCEDURE — 86140 C-REACTIVE PROTEIN: CPT | Performed by: HOSPITALIST

## 2023-01-01 PROCEDURE — 25010000002 MILRINONE LACTATE 10 MG/10ML SOLUTION: Performed by: ANESTHESIOLOGY

## 2023-01-01 PROCEDURE — 25010000002 CEFAZOLIN PER 500 MG: Performed by: ANESTHESIOLOGY

## 2023-01-01 PROCEDURE — C1760 CLOSURE DEV, VASC: HCPCS | Performed by: THORACIC SURGERY (CARDIOTHORACIC VASCULAR SURGERY)

## 2023-01-01 PROCEDURE — 83690 ASSAY OF LIPASE: CPT | Performed by: STUDENT IN AN ORGANIZED HEALTH CARE EDUCATION/TRAINING PROGRAM

## 2023-01-01 PROCEDURE — 93306 TTE W/DOPPLER COMPLETE: CPT | Performed by: INTERNAL MEDICINE

## 2023-01-01 PROCEDURE — 92610 EVALUATE SWALLOWING FUNCTION: CPT

## 2023-01-01 PROCEDURE — 84145 PROCALCITONIN (PCT): CPT | Performed by: HOSPITALIST

## 2023-01-01 PROCEDURE — 25010000002 HEPARIN (PORCINE) PER 1000 UNITS: Performed by: ANESTHESIOLOGY

## 2023-01-01 PROCEDURE — 25010000002 ANTI-INHIBITOR COAGULANT COMPLEX 1000 UNITS RECONSTITUTED SOLUTION: Performed by: THORACIC SURGERY (CARDIOTHORACIC VASCULAR SURGERY)

## 2023-01-01 PROCEDURE — 99024 POSTOP FOLLOW-UP VISIT: CPT | Performed by: STUDENT IN AN ORGANIZED HEALTH CARE EDUCATION/TRAINING PROGRAM

## 2023-01-01 PROCEDURE — 25010000002 HEPARIN (PORCINE) 25000-0.45 UT/250ML-% SOLUTION: Performed by: THORACIC SURGERY (CARDIOTHORACIC VASCULAR SURGERY)

## 2023-01-01 PROCEDURE — 93306 TTE W/DOPPLER COMPLETE: CPT

## 2023-01-01 PROCEDURE — 85730 THROMBOPLASTIN TIME PARTIAL: CPT | Performed by: PHYSICIAN ASSISTANT

## 2023-01-01 PROCEDURE — 85027 COMPLETE CBC AUTOMATED: CPT | Performed by: THORACIC SURGERY (CARDIOTHORACIC VASCULAR SURGERY)

## 2023-01-01 PROCEDURE — 06BP4ZZ EXCISION OF RIGHT SAPHENOUS VEIN, PERCUTANEOUS ENDOSCOPIC APPROACH: ICD-10-PCS | Performed by: THORACIC SURGERY (CARDIOTHORACIC VASCULAR SURGERY)

## 2023-01-01 PROCEDURE — 83036 HEMOGLOBIN GLYCOSYLATED A1C: CPT | Performed by: HOSPITALIST

## 2023-01-01 PROCEDURE — 25010000002 EPINEPHRINE PER 0.1 MG: Performed by: ANESTHESIOLOGY

## 2023-01-01 PROCEDURE — 25010000002 FENTANYL CITRATE (PF) 50 MCG/ML SOLUTION: Performed by: INTERNAL MEDICINE

## 2023-01-01 PROCEDURE — 99231 SBSQ HOSP IP/OBS SF/LOW 25: CPT | Performed by: INTERNAL MEDICINE

## 2023-01-01 PROCEDURE — 85025 COMPLETE CBC W/AUTO DIFF WBC: CPT | Performed by: HOSPITALIST

## 2023-01-01 PROCEDURE — 99024 POSTOP FOLLOW-UP VISIT: CPT

## 2023-01-01 PROCEDURE — 25010000002 FENTANYL CITRATE (PF) 250 MCG/5ML SOLUTION: Performed by: ANESTHESIOLOGY

## 2023-01-01 PROCEDURE — 85025 COMPLETE CBC W/AUTO DIFF WBC: CPT | Performed by: STUDENT IN AN ORGANIZED HEALTH CARE EDUCATION/TRAINING PROGRAM

## 2023-01-01 PROCEDURE — P9012 CRYOPRECIPITATE EACH UNIT: HCPCS

## 2023-01-01 PROCEDURE — 25010000002 HEPARIN (PORCINE) PER 1000 UNITS: Performed by: STUDENT IN AN ORGANIZED HEALTH CARE EDUCATION/TRAINING PROGRAM

## 2023-01-01 PROCEDURE — 85384 FIBRINOGEN ACTIVITY: CPT | Performed by: THORACIC SURGERY (CARDIOTHORACIC VASCULAR SURGERY)

## 2023-01-01 DEVICE — SUT STL 2/0 CV SH 24IN TPW32: Type: IMPLANTABLE DEVICE | Site: HEART | Status: FUNCTIONAL

## 2023-01-01 DEVICE — ADHS SURG BIOGLUE PREF STD/TP 10ML: Type: IMPLANTABLE DEVICE | Site: HEART | Status: FUNCTIONAL

## 2023-01-01 DEVICE — GRFT HEMASHLD WOVN STR 30CMX24 PLAT: Type: IMPLANTABLE DEVICE | Site: AORTA | Status: FUNCTIONAL

## 2023-01-01 DEVICE — PLEDGET INCISIONLINE REINF TFE SFT PTFE 1.5X4.8X9.5MM WHT: Type: IMPLANTABLE DEVICE | Site: HEART | Status: FUNCTIONAL

## 2023-01-01 DEVICE — ABSORBABLE HEMOSTAT (OXIDIZED REGENERATED CELLULOSE, U.S.P.)
Type: IMPLANTABLE DEVICE | Site: HEART | Status: FUNCTIONAL
Brand: SURGICEL FIBRILLAR

## 2023-01-01 DEVICE — ABSORBABLE HEMOSTAT (OXIDIZED REGENERATED CELLULOSE)
Type: IMPLANTABLE DEVICE | Site: HEART | Status: FUNCTIONAL
Brand: SURGICEL NU-KNIT

## 2023-01-01 DEVICE — DISK-SHAPED STYLE, SILICONE (1 PER STERILE PKG)
Type: IMPLANTABLE DEVICE | Site: HEART | Status: FUNCTIONAL
Brand: SCANLAN® RADIOMARK® GRAFT MARKERS

## 2023-01-01 DEVICE — BARD® PTFE FELT, 15.2 CM X 15.2 CM
Type: IMPLANTABLE DEVICE | Site: HEART | Status: FUNCTIONAL
Brand: BARD® PTFE FELT

## 2023-01-01 RX ORDER — PAPAVERINE HYDROCHLORIDE 30 MG/ML
INJECTION INTRAMUSCULAR; INTRAVENOUS AS NEEDED
Status: DISCONTINUED | OUTPATIENT
Start: 2023-01-01 | End: 2023-11-14 | Stop reason: HOSPADM

## 2023-01-01 RX ORDER — LISINOPRIL 40 MG/1
2 TABLET ORAL 2 TIMES DAILY
COMMUNITY

## 2023-01-01 RX ORDER — CHLORHEXIDINE GLUCONATE ORAL RINSE 1.2 MG/ML
15 SOLUTION DENTAL EVERY 12 HOURS
Status: DISCONTINUED | OUTPATIENT
Start: 2023-01-01 | End: 2023-01-01

## 2023-01-01 RX ORDER — ASPIRIN 81 MG/1
81 TABLET, CHEWABLE ORAL DAILY
Status: DISCONTINUED | OUTPATIENT
Start: 2023-01-01 | End: 2023-01-01 | Stop reason: HOSPADM

## 2023-01-01 RX ORDER — ALBUTEROL SULFATE 2.5 MG/3ML
2.5 SOLUTION RESPIRATORY (INHALATION) EVERY 6 HOURS PRN
Status: DISCONTINUED | OUTPATIENT
Start: 2023-01-01 | End: 2023-11-14 | Stop reason: HOSPADM

## 2023-01-01 RX ORDER — SODIUM CHLORIDE 0.9 % (FLUSH) 0.9 %
10 SYRINGE (ML) INJECTION AS NEEDED
Status: DISCONTINUED | OUTPATIENT
Start: 2023-01-01 | End: 2023-11-14 | Stop reason: HOSPADM

## 2023-01-01 RX ORDER — NITROGLYCERIN 0.4 MG/1
0.4 TABLET SUBLINGUAL
Status: DISCONTINUED | OUTPATIENT
Start: 2023-01-01 | End: 2023-01-01 | Stop reason: HOSPADM

## 2023-01-01 RX ORDER — CHOLECALCIFEROL (VITAMIN D3) 1250 MCG
50000 CAPSULE ORAL
Status: ON HOLD | COMMUNITY

## 2023-01-01 RX ORDER — NICOTINE 21 MG/24HR
1 PATCH, TRANSDERMAL 24 HOURS TRANSDERMAL
Status: ON HOLD
Start: 2023-01-01 | End: 2023-01-01

## 2023-01-01 RX ORDER — SODIUM CHLORIDE 0.9 % (FLUSH) 0.9 %
10 SYRINGE (ML) INJECTION EVERY 12 HOURS SCHEDULED
Status: DISCONTINUED | OUTPATIENT
Start: 2023-01-01 | End: 2023-01-01 | Stop reason: HOSPADM

## 2023-01-01 RX ORDER — ASPIRIN 81 MG/1
324 TABLET, CHEWABLE ORAL ONCE
Status: COMPLETED | OUTPATIENT
Start: 2023-01-01 | End: 2023-01-01

## 2023-01-01 RX ORDER — HEPARIN SODIUM 10000 [USP'U]/100ML
26 INJECTION, SOLUTION INTRAVENOUS
Status: DISCONTINUED | OUTPATIENT
Start: 2023-01-01 | End: 2023-01-01

## 2023-01-01 RX ORDER — HEPARIN SODIUM 1000 [USP'U]/ML
INJECTION, SOLUTION INTRAVENOUS; SUBCUTANEOUS
Status: DISCONTINUED | OUTPATIENT
Start: 2023-01-01 | End: 2023-01-01 | Stop reason: HOSPADM

## 2023-01-01 RX ORDER — EPINEPHRINE 1 MG/ML
INJECTION, SOLUTION, CONCENTRATE INTRAVENOUS AS NEEDED
Status: DISCONTINUED | OUTPATIENT
Start: 2023-01-01 | End: 2023-11-14 | Stop reason: SURG

## 2023-01-01 RX ORDER — LISINOPRIL 40 MG/1
40 TABLET ORAL DAILY
Status: ON HOLD
Start: 2023-01-01 | End: 2023-01-01

## 2023-01-01 RX ORDER — SODIUM CHLORIDE 9 MG/ML
40 INJECTION, SOLUTION INTRAVENOUS AS NEEDED
Status: DISCONTINUED | OUTPATIENT
Start: 2023-01-01 | End: 2023-01-01 | Stop reason: HOSPADM

## 2023-01-01 RX ORDER — ASPIRIN 81 MG/1
81 TABLET ORAL ONCE
Status: COMPLETED | OUTPATIENT
Start: 2023-01-01 | End: 2023-01-01

## 2023-01-01 RX ORDER — LIDOCAINE HYDROCHLORIDE 20 MG/ML
INJECTION, SOLUTION INFILTRATION; PERINEURAL
Status: DISCONTINUED | OUTPATIENT
Start: 2023-01-01 | End: 2023-01-01 | Stop reason: HOSPADM

## 2023-01-01 RX ORDER — ROCURONIUM BROMIDE 10 MG/ML
INJECTION, SOLUTION INTRAVENOUS AS NEEDED
Status: DISCONTINUED | OUTPATIENT
Start: 2023-01-01 | End: 2023-11-14 | Stop reason: SURG

## 2023-01-01 RX ORDER — HYDROCODONE BITARTRATE AND ACETAMINOPHEN 7.5; 325 MG/1; MG/1
1 TABLET ORAL EVERY 8 HOURS PRN
Status: CANCELLED | OUTPATIENT
Start: 2023-01-01

## 2023-01-01 RX ORDER — POLYETHYLENE GLYCOL 3350 17 G/17G
17 POWDER, FOR SOLUTION ORAL DAILY PRN
Status: DISCONTINUED | OUTPATIENT
Start: 2023-01-01 | End: 2023-01-01 | Stop reason: HOSPADM

## 2023-01-01 RX ORDER — HEPARIN SODIUM 5000 [USP'U]/ML
4000 INJECTION, SOLUTION INTRAVENOUS; SUBCUTANEOUS AS NEEDED
Status: DISCONTINUED | OUTPATIENT
Start: 2023-01-01 | End: 2023-01-01

## 2023-01-01 RX ORDER — LISINOPRIL 40 MG/1
40 TABLET ORAL DAILY
Status: DISCONTINUED | OUTPATIENT
Start: 2023-01-01 | End: 2023-11-14 | Stop reason: HOSPADM

## 2023-01-01 RX ORDER — LABETALOL 200 MG/1
400 TABLET, FILM COATED ORAL 2 TIMES DAILY
Status: CANCELLED | OUTPATIENT
Start: 2023-01-01

## 2023-01-01 RX ORDER — FENTANYL CITRATE 50 UG/ML
INJECTION, SOLUTION INTRAMUSCULAR; INTRAVENOUS AS NEEDED
Status: DISCONTINUED | OUTPATIENT
Start: 2023-01-01 | End: 2023-11-14 | Stop reason: SURG

## 2023-01-01 RX ORDER — MORPHINE SULFATE 2 MG/ML
2 INJECTION, SOLUTION INTRAMUSCULAR; INTRAVENOUS EVERY 4 HOURS PRN
Status: DISCONTINUED | OUTPATIENT
Start: 2023-01-01 | End: 2023-11-14 | Stop reason: HOSPADM

## 2023-01-01 RX ORDER — IPRATROPIUM BROMIDE AND ALBUTEROL SULFATE 2.5; .5 MG/3ML; MG/3ML
3 SOLUTION RESPIRATORY (INHALATION) EVERY 4 HOURS PRN
Status: DISCONTINUED | OUTPATIENT
Start: 2023-01-01 | End: 2023-11-14 | Stop reason: HOSPADM

## 2023-01-01 RX ORDER — NICOTINE 21 MG/24HR
1 PATCH, TRANSDERMAL 24 HOURS TRANSDERMAL
Status: DISCONTINUED | OUTPATIENT
Start: 2023-01-01 | End: 2023-11-14 | Stop reason: HOSPADM

## 2023-01-01 RX ORDER — TIZANIDINE 4 MG/1
4 TABLET ORAL 3 TIMES DAILY PRN
Status: DISCONTINUED | OUTPATIENT
Start: 2023-01-01 | End: 2023-01-01 | Stop reason: HOSPADM

## 2023-01-01 RX ORDER — TIZANIDINE 4 MG/1
4 TABLET ORAL 3 TIMES DAILY PRN
Status: DISCONTINUED | OUTPATIENT
Start: 2023-01-01 | End: 2023-11-14 | Stop reason: HOSPADM

## 2023-01-01 RX ORDER — MAGNESIUM HYDROXIDE 1200 MG/15ML
LIQUID ORAL AS NEEDED
Status: DISCONTINUED | OUTPATIENT
Start: 2023-01-01 | End: 2023-11-14 | Stop reason: HOSPADM

## 2023-01-01 RX ORDER — ACETAMINOPHEN 325 MG/1
650 TABLET ORAL EVERY 4 HOURS PRN
Status: DISCONTINUED | OUTPATIENT
Start: 2023-01-01 | End: 2023-01-01 | Stop reason: HOSPADM

## 2023-01-01 RX ORDER — HYDROCODONE BITARTRATE AND ACETAMINOPHEN 7.5; 325 MG/1; MG/1
1 TABLET ORAL EVERY 8 HOURS PRN
Status: ON HOLD | COMMUNITY

## 2023-01-01 RX ORDER — PROCHLORPERAZINE EDISYLATE 5 MG/ML
5 INJECTION INTRAMUSCULAR; INTRAVENOUS ONCE
Status: COMPLETED | OUTPATIENT
Start: 2023-01-01 | End: 2023-01-01

## 2023-01-01 RX ORDER — HYDROCHLOROTHIAZIDE 25 MG/1
25 TABLET ORAL DAILY
Status: DISCONTINUED | OUTPATIENT
Start: 2023-01-01 | End: 2023-11-14 | Stop reason: HOSPADM

## 2023-01-01 RX ORDER — PROTAMINE SULFATE 10 MG/ML
INJECTION, SOLUTION INTRAVENOUS AS NEEDED
Status: DISCONTINUED | OUTPATIENT
Start: 2023-01-01 | End: 2023-11-14 | Stop reason: SURG

## 2023-01-01 RX ORDER — NOREPINEPHRINE BITARTRATE 0.03 MG/ML
INJECTION, SOLUTION INTRAVENOUS CONTINUOUS PRN
Status: DISCONTINUED | OUTPATIENT
Start: 2023-01-01 | End: 2023-11-14 | Stop reason: SURG

## 2023-01-01 RX ORDER — MIDAZOLAM HYDROCHLORIDE 1 MG/ML
INJECTION INTRAMUSCULAR; INTRAVENOUS AS NEEDED
Status: DISCONTINUED | OUTPATIENT
Start: 2023-01-01 | End: 2023-11-14 | Stop reason: SURG

## 2023-01-01 RX ORDER — SODIUM CHLORIDE 9 MG/ML
1 INJECTION, SOLUTION INTRAVENOUS CONTINUOUS
Status: ACTIVE | OUTPATIENT
Start: 2023-01-01 | End: 2023-01-01

## 2023-01-01 RX ORDER — HEPARIN SODIUM 5000 [USP'U]/ML
2000 INJECTION, SOLUTION INTRAVENOUS; SUBCUTANEOUS AS NEEDED
Status: DISCONTINUED | OUTPATIENT
Start: 2023-01-01 | End: 2023-01-01

## 2023-01-01 RX ORDER — HEPARIN SODIUM 1000 [USP'U]/ML
4000 INJECTION, SOLUTION INTRAVENOUS; SUBCUTANEOUS ONCE
Status: COMPLETED | OUTPATIENT
Start: 2023-01-01 | End: 2023-01-01

## 2023-01-01 RX ORDER — SUCRALFATE 1 G/1
1 TABLET ORAL 4 TIMES DAILY
Status: DISCONTINUED | OUTPATIENT
Start: 2023-01-01 | End: 2023-11-14 | Stop reason: HOSPADM

## 2023-01-01 RX ORDER — HEPARIN SODIUM 1000 [USP'U]/ML
INJECTION, SOLUTION INTRAVENOUS; SUBCUTANEOUS AS NEEDED
Status: DISCONTINUED | OUTPATIENT
Start: 2023-01-01 | End: 2023-11-14 | Stop reason: SURG

## 2023-01-01 RX ORDER — BENZONATATE 200 MG/1
200 CAPSULE ORAL 3 TIMES DAILY PRN
Qty: 42 CAPSULE | Refills: 3 | Status: ON HOLD | OUTPATIENT
Start: 2023-01-01

## 2023-01-01 RX ORDER — SODIUM CHLORIDE 9 MG/ML
INJECTION, SOLUTION INTRAVENOUS AS NEEDED
Status: DISCONTINUED | OUTPATIENT
Start: 2023-01-01 | End: 2023-11-14 | Stop reason: HOSPADM

## 2023-01-01 RX ORDER — FENTANYL CITRATE 50 UG/ML
INJECTION, SOLUTION INTRAMUSCULAR; INTRAVENOUS
Status: DISCONTINUED | OUTPATIENT
Start: 2023-01-01 | End: 2023-01-01 | Stop reason: HOSPADM

## 2023-01-01 RX ORDER — LABETALOL 200 MG/1
400 TABLET, FILM COATED ORAL 2 TIMES DAILY
Status: ON HOLD | COMMUNITY

## 2023-01-01 RX ORDER — FAMOTIDINE 20 MG/1
20 TABLET, FILM COATED ORAL
Status: DISCONTINUED | OUTPATIENT
Start: 2023-01-01 | End: 2023-01-01 | Stop reason: HOSPADM

## 2023-01-01 RX ORDER — HEPARIN SODIUM 5000 [USP'U]/ML
4000 INJECTION, SOLUTION INTRAVENOUS; SUBCUTANEOUS ONCE
Status: COMPLETED | OUTPATIENT
Start: 2023-01-01 | End: 2023-01-01

## 2023-01-01 RX ORDER — CEFAZOLIN SODIUM 1 G/3ML
INJECTION, POWDER, FOR SOLUTION INTRAMUSCULAR; INTRAVENOUS AS NEEDED
Status: DISCONTINUED | OUTPATIENT
Start: 2023-01-01 | End: 2023-11-14 | Stop reason: SURG

## 2023-01-01 RX ORDER — AMOXICILLIN 250 MG
2 CAPSULE ORAL 2 TIMES DAILY
Status: DISCONTINUED | OUTPATIENT
Start: 2023-01-01 | End: 2023-01-01 | Stop reason: HOSPADM

## 2023-01-01 RX ORDER — ERGOCALCIFEROL 1.25 MG/1
50000 CAPSULE ORAL WEEKLY
Qty: 5 CAPSULE | Refills: 3 | Status: ON HOLD | OUTPATIENT
Start: 2023-01-01 | End: 2023-01-01

## 2023-01-01 RX ORDER — LABETALOL 200 MG/1
400 TABLET, FILM COATED ORAL 2 TIMES DAILY
Status: DISCONTINUED | OUTPATIENT
Start: 2023-01-01 | End: 2023-11-14 | Stop reason: HOSPADM

## 2023-01-01 RX ORDER — ASPIRIN 81 MG/1
81 TABLET, CHEWABLE ORAL DAILY
Status: DISCONTINUED | OUTPATIENT
Start: 2023-01-01 | End: 2023-11-14 | Stop reason: HOSPADM

## 2023-01-01 RX ORDER — LISINOPRIL 40 MG/1
80 TABLET ORAL 2 TIMES DAILY
COMMUNITY
End: 2023-01-01 | Stop reason: HOSPADM

## 2023-01-01 RX ORDER — ALBUTEROL SULFATE 2.5 MG/3ML
2.5 SOLUTION RESPIRATORY (INHALATION) EVERY 4 HOURS PRN
Status: DISCONTINUED | OUTPATIENT
Start: 2023-01-01 | End: 2023-01-01 | Stop reason: HOSPADM

## 2023-01-01 RX ORDER — TRAMADOL HYDROCHLORIDE 50 MG/1
50 TABLET ORAL 2 TIMES DAILY PRN
Status: ON HOLD | COMMUNITY
End: 2023-01-01

## 2023-01-01 RX ORDER — SODIUM CHLORIDE, SODIUM LACTATE, POTASSIUM CHLORIDE, CALCIUM CHLORIDE 600; 310; 30; 20 MG/100ML; MG/100ML; MG/100ML; MG/100ML
INJECTION, SOLUTION INTRAVENOUS CONTINUOUS PRN
Status: DISCONTINUED | OUTPATIENT
Start: 2023-01-01 | End: 2023-11-14 | Stop reason: SURG

## 2023-01-01 RX ORDER — HYDROCODONE BITARTRATE AND ACETAMINOPHEN 7.5; 325 MG/1; MG/1
1 TABLET ORAL EVERY 8 HOURS PRN
Status: DISCONTINUED | OUTPATIENT
Start: 2023-01-01 | End: 2023-01-01 | Stop reason: HOSPADM

## 2023-01-01 RX ORDER — BENZONATATE 100 MG/1
200 CAPSULE ORAL 3 TIMES DAILY PRN
Status: DISCONTINUED | OUTPATIENT
Start: 2023-01-01 | End: 2023-11-14 | Stop reason: HOSPADM

## 2023-01-01 RX ORDER — ACETAMINOPHEN 325 MG/1
650 TABLET ORAL EVERY 4 HOURS PRN
Status: DISCONTINUED | OUTPATIENT
Start: 2023-01-01 | End: 2023-11-14 | Stop reason: HOSPADM

## 2023-01-01 RX ORDER — ASPIRIN 81 MG/1
81 TABLET, CHEWABLE ORAL DAILY
Status: ON HOLD
Start: 2023-01-01 | End: 2023-01-01

## 2023-01-01 RX ORDER — GLYCOPYRROLATE 0.2 MG/ML
INJECTION INTRAMUSCULAR; INTRAVENOUS AS NEEDED
Status: DISCONTINUED | OUTPATIENT
Start: 2023-01-01 | End: 2023-11-14 | Stop reason: SURG

## 2023-01-01 RX ORDER — FAMOTIDINE 10 MG/ML
20 INJECTION, SOLUTION INTRAVENOUS EVERY 12 HOURS SCHEDULED
Status: DISCONTINUED | OUTPATIENT
Start: 2023-01-01 | End: 2023-11-14 | Stop reason: HOSPADM

## 2023-01-01 RX ORDER — HEPARIN SODIUM 1000 [USP'U]/ML
2000 INJECTION, SOLUTION INTRAVENOUS; SUBCUTANEOUS ONCE
Status: COMPLETED | OUTPATIENT
Start: 2023-01-01 | End: 2023-01-01

## 2023-01-01 RX ORDER — BUDESONIDE AND FORMOTEROL FUMARATE DIHYDRATE 160; 4.5 UG/1; UG/1
2 AEROSOL RESPIRATORY (INHALATION)
Status: DISCONTINUED | OUTPATIENT
Start: 2023-01-01 | End: 2023-01-01 | Stop reason: HOSPADM

## 2023-01-01 RX ORDER — MIDAZOLAM HYDROCHLORIDE 1 MG/ML
2 INJECTION INTRAMUSCULAR; INTRAVENOUS ONCE
Status: COMPLETED | OUTPATIENT
Start: 2023-01-01 | End: 2023-01-01

## 2023-01-01 RX ORDER — SUCRALFATE 1 G/1
1 TABLET ORAL 4 TIMES DAILY
Status: DISCONTINUED | OUTPATIENT
Start: 2023-01-01 | End: 2023-01-01 | Stop reason: HOSPADM

## 2023-01-01 RX ORDER — BISACODYL 10 MG
10 SUPPOSITORY, RECTAL RECTAL DAILY PRN
Status: DISCONTINUED | OUTPATIENT
Start: 2023-01-01 | End: 2023-01-01 | Stop reason: HOSPADM

## 2023-01-01 RX ORDER — CHOLECALCIFEROL (VITAMIN D3) 1250 MCG
50000 CAPSULE ORAL
Status: CANCELLED | OUTPATIENT
Start: 2023-01-01

## 2023-01-01 RX ORDER — HEPARIN SODIUM 1000 [USP'U]/ML
2000 INJECTION, SOLUTION INTRAVENOUS; SUBCUTANEOUS ONCE
Qty: 10 ML | Refills: 0 | Status: COMPLETED | OUTPATIENT
Start: 2023-01-01 | End: 2023-01-01

## 2023-01-01 RX ORDER — ATORVASTATIN CALCIUM 40 MG/1
40 TABLET, FILM COATED ORAL NIGHTLY
Status: DISCONTINUED | OUTPATIENT
Start: 2023-01-01 | End: 2023-01-01 | Stop reason: HOSPADM

## 2023-01-01 RX ORDER — NITROGLYCERIN 0.4 MG/1
0.4 TABLET SUBLINGUAL
Status: CANCELLED | OUTPATIENT
Start: 2023-01-01

## 2023-01-01 RX ORDER — PROMETHAZINE HYDROCHLORIDE 25 MG/1
25 TABLET ORAL EVERY 6 HOURS PRN
Status: DISCONTINUED | OUTPATIENT
Start: 2023-01-01 | End: 2023-11-14 | Stop reason: HOSPADM

## 2023-01-01 RX ORDER — MILRINONE LACTATE 1 MG/ML
INJECTION, SOLUTION INTRAVENOUS AS NEEDED
Status: DISCONTINUED | OUTPATIENT
Start: 2023-01-01 | End: 2023-11-14 | Stop reason: SURG

## 2023-01-01 RX ORDER — ETOMIDATE 2 MG/ML
INJECTION INTRAVENOUS AS NEEDED
Status: DISCONTINUED | OUTPATIENT
Start: 2023-01-01 | End: 2023-11-14 | Stop reason: SURG

## 2023-01-01 RX ORDER — LABETALOL 200 MG/1
400 TABLET, FILM COATED ORAL ONCE
Status: COMPLETED | OUTPATIENT
Start: 2023-01-01 | End: 2023-01-01

## 2023-01-01 RX ORDER — AMINOCAPROIC ACID 250 MG/ML
INJECTION, SOLUTION INTRAVENOUS AS NEEDED
Status: DISCONTINUED | OUTPATIENT
Start: 2023-01-01 | End: 2023-11-14 | Stop reason: SURG

## 2023-01-01 RX ORDER — SODIUM CHLORIDE 0.9 % (FLUSH) 0.9 %
10 SYRINGE (ML) INJECTION AS NEEDED
Status: DISCONTINUED | OUTPATIENT
Start: 2023-01-01 | End: 2023-01-01 | Stop reason: HOSPADM

## 2023-01-01 RX ORDER — NITROGLYCERIN 20 MG/100ML
5-200 INJECTION INTRAVENOUS
Status: DISCONTINUED | OUTPATIENT
Start: 2023-01-01 | End: 2023-01-01 | Stop reason: HOSPADM

## 2023-01-01 RX ORDER — SODIUM CHLORIDE 9 MG/ML
INJECTION, SOLUTION INTRAVENOUS
Status: COMPLETED | OUTPATIENT
Start: 2023-01-01 | End: 2023-01-01

## 2023-01-01 RX ORDER — FAMOTIDINE 20 MG/1
20 TABLET, FILM COATED ORAL
Status: DISCONTINUED | OUTPATIENT
Start: 2023-01-01 | End: 2023-11-14 | Stop reason: HOSPADM

## 2023-01-01 RX ORDER — HEPARIN SODIUM 10000 [USP'U]/100ML
13 INJECTION, SOLUTION INTRAVENOUS
Status: DISCONTINUED | OUTPATIENT
Start: 2023-01-01 | End: 2023-01-01

## 2023-01-01 RX ORDER — CALCIUM CHLORIDE 100 MG/ML
INJECTION INTRAVENOUS; INTRAVENTRICULAR AS NEEDED
Status: DISCONTINUED | OUTPATIENT
Start: 2023-01-01 | End: 2023-11-14 | Stop reason: SURG

## 2023-01-01 RX ORDER — BISACODYL 5 MG/1
5 TABLET, DELAYED RELEASE ORAL DAILY PRN
Status: DISCONTINUED | OUTPATIENT
Start: 2023-01-01 | End: 2023-01-01 | Stop reason: HOSPADM

## 2023-01-01 RX ORDER — MORPHINE SULFATE 4 MG/ML
3 INJECTION, SOLUTION INTRAMUSCULAR; INTRAVENOUS
Status: COMPLETED | OUTPATIENT
Start: 2023-01-01 | End: 2023-01-01

## 2023-01-01 RX ORDER — NITROGLYCERIN 0.4 MG/1
0.4 TABLET SUBLINGUAL
Status: DISCONTINUED | OUTPATIENT
Start: 2023-01-01 | End: 2023-01-01 | Stop reason: SDUPTHER

## 2023-01-01 RX ORDER — LISINOPRIL 10 MG/1
40 TABLET ORAL DAILY
Status: DISCONTINUED | OUTPATIENT
Start: 2023-01-01 | End: 2023-01-01 | Stop reason: HOSPADM

## 2023-01-01 RX ORDER — HYDROCODONE BITARTRATE AND ACETAMINOPHEN 7.5; 325 MG/1; MG/1
1 TABLET ORAL EVERY 8 HOURS PRN
Status: DISCONTINUED | OUTPATIENT
Start: 2023-01-01 | End: 2023-11-14 | Stop reason: HOSPADM

## 2023-01-01 RX ORDER — POTASSIUM CHLORIDE 20 MEQ/1
40 TABLET, EXTENDED RELEASE ORAL EVERY 4 HOURS
Status: COMPLETED | OUTPATIENT
Start: 2023-01-01 | End: 2023-01-01

## 2023-01-01 RX ORDER — PANTOPRAZOLE SODIUM 40 MG/1
40 TABLET, DELAYED RELEASE ORAL
Status: DISCONTINUED | OUTPATIENT
Start: 2023-01-01 | End: 2023-01-01

## 2023-01-01 RX ORDER — SODIUM CHLORIDE 9 MG/ML
40 INJECTION, SOLUTION INTRAVENOUS AS NEEDED
Status: DISCONTINUED | OUTPATIENT
Start: 2023-01-01 | End: 2023-11-14 | Stop reason: HOSPADM

## 2023-01-01 RX ORDER — TIZANIDINE 4 MG/1
4 TABLET ORAL 3 TIMES DAILY PRN
Status: ON HOLD | COMMUNITY

## 2023-01-01 RX ORDER — PROMETHAZINE HYDROCHLORIDE 25 MG/1
25 TABLET ORAL EVERY 6 HOURS PRN
Status: CANCELLED | OUTPATIENT
Start: 2023-01-01

## 2023-01-01 RX ORDER — HEPARIN SODIUM 10000 [USP'U]/100ML
13 INJECTION, SOLUTION INTRAVENOUS
Status: ON HOLD
Start: 2023-01-01 | End: 2023-01-01

## 2023-01-01 RX ORDER — SODIUM CHLORIDE 0.9 % (FLUSH) 0.9 %
10 SYRINGE (ML) INJECTION EVERY 12 HOURS SCHEDULED
Status: DISCONTINUED | OUTPATIENT
Start: 2023-01-01 | End: 2023-11-14 | Stop reason: HOSPADM

## 2023-01-01 RX ORDER — ACETAMINOPHEN 325 MG/1
325 TABLET ORAL ONCE
Status: COMPLETED | OUTPATIENT
Start: 2023-01-01 | End: 2023-01-01

## 2023-01-01 RX ORDER — CHLORHEXIDINE GLUCONATE 500 MG/1
1 CLOTH TOPICAL EVERY 12 HOURS PRN
Status: DISCONTINUED | OUTPATIENT
Start: 2023-01-01 | End: 2023-11-14 | Stop reason: HOSPADM

## 2023-01-01 RX ORDER — CHLORHEXIDINE GLUCONATE 500 MG/1
CLOTH TOPICAL EVERY 12 HOURS PRN
Status: DISCONTINUED | OUTPATIENT
Start: 2023-01-01 | End: 2023-01-01 | Stop reason: SDUPTHER

## 2023-01-01 RX ORDER — LABETALOL 200 MG/1
400 TABLET, FILM COATED ORAL 2 TIMES DAILY
Status: DISCONTINUED | OUTPATIENT
Start: 2023-01-01 | End: 2023-01-01 | Stop reason: HOSPADM

## 2023-01-01 RX ORDER — CHLORHEXIDINE GLUCONATE ORAL RINSE 1.2 MG/ML
15 SOLUTION DENTAL EVERY 12 HOURS SCHEDULED
Status: COMPLETED | OUTPATIENT
Start: 2023-01-01 | End: 2023-01-01

## 2023-01-01 RX ORDER — ALBUTEROL SULFATE 90 UG/1
2 AEROSOL, METERED RESPIRATORY (INHALATION) EVERY 4 HOURS PRN
Qty: 8.5 G | Refills: 5 | Status: ON HOLD | OUTPATIENT
Start: 2023-01-01

## 2023-01-01 RX ORDER — ATORVASTATIN CALCIUM 40 MG/1
40 TABLET, FILM COATED ORAL NIGHTLY
Status: DISCONTINUED | OUTPATIENT
Start: 2023-01-01 | End: 2023-11-14 | Stop reason: HOSPADM

## 2023-01-01 RX ORDER — MORPHINE SULFATE 4 MG/ML
4 INJECTION, SOLUTION INTRAMUSCULAR; INTRAVENOUS
Status: DISPENSED | OUTPATIENT
Start: 2023-01-01 | End: 2023-01-01

## 2023-01-01 RX ORDER — NICOTINE 21 MG/24HR
1 PATCH, TRANSDERMAL 24 HOURS TRANSDERMAL
Status: DISCONTINUED | OUTPATIENT
Start: 2023-01-01 | End: 2023-01-01 | Stop reason: HOSPADM

## 2023-01-01 RX ORDER — BENZONATATE 100 MG/1
200 CAPSULE ORAL 3 TIMES DAILY PRN
Status: DISCONTINUED | OUTPATIENT
Start: 2023-01-01 | End: 2023-01-01 | Stop reason: HOSPADM

## 2023-01-01 RX ORDER — ATORVASTATIN CALCIUM 40 MG/1
40 TABLET, FILM COATED ORAL NIGHTLY
Status: ON HOLD
Start: 2023-01-01 | End: 2023-01-01

## 2023-01-01 RX ORDER — HYDROCHLOROTHIAZIDE 25 MG/1
25 TABLET ORAL DAILY
Status: DISCONTINUED | OUTPATIENT
Start: 2023-01-01 | End: 2023-01-01 | Stop reason: HOSPADM

## 2023-01-01 RX ORDER — CHLORHEXIDINE GLUCONATE ORAL RINSE 1.2 MG/ML
15 SOLUTION DENTAL
Qty: 15 ML | Refills: 0 | Status: ACTIVE | OUTPATIENT
Start: 2023-01-01 | End: 2023-01-01

## 2023-01-01 RX ORDER — HEPARIN SODIUM 10000 [USP'U]/100ML
13 INJECTION, SOLUTION INTRAVENOUS
Status: DISCONTINUED | OUTPATIENT
Start: 2023-01-01 | End: 2023-01-01 | Stop reason: HOSPADM

## 2023-01-01 RX ORDER — MIDAZOLAM HYDROCHLORIDE 1 MG/ML
INJECTION INTRAMUSCULAR; INTRAVENOUS
Status: DISCONTINUED | OUTPATIENT
Start: 2023-01-01 | End: 2023-01-01 | Stop reason: HOSPADM

## 2023-01-01 RX ADMIN — HEPARIN SODIUM 4000 UNITS: 1000 INJECTION INTRAVENOUS; SUBCUTANEOUS at 02:52

## 2023-01-01 RX ADMIN — SUCRALFATE 1 G: 1 TABLET ORAL at 20:46

## 2023-01-01 RX ADMIN — SODIUM BICARBONATE 50 MEQ: 84 INJECTION INTRAVENOUS at 23:15

## 2023-01-01 RX ADMIN — SODIUM CHLORIDE 2000 MG: 900 INJECTION INTRAVENOUS at 12:08

## 2023-01-01 RX ADMIN — MORPHINE SULFATE 3 MG: 4 INJECTION, SOLUTION INTRAMUSCULAR; INTRAVENOUS at 01:37

## 2023-01-01 RX ADMIN — MORPHINE SULFATE 2 MG: 2 INJECTION, SOLUTION INTRAMUSCULAR; INTRAVENOUS at 01:00

## 2023-01-01 RX ADMIN — SODIUM BICARBONATE 50 MEQ: 84 INJECTION INTRAVENOUS at 22:28

## 2023-01-01 RX ADMIN — LISINOPRIL 40 MG: 40 TABLET ORAL at 08:02

## 2023-01-01 RX ADMIN — SUCRALFATE 1 G: 1 TABLET ORAL at 21:49

## 2023-01-01 RX ADMIN — HYDROCODONE BITARTRATE AND ACETAMINOPHEN 1 TABLET: 7.5; 325 TABLET ORAL at 15:32

## 2023-01-01 RX ADMIN — FENTANYL CITRATE 100 MCG: 50 INJECTION, SOLUTION INTRAMUSCULAR; INTRAVENOUS at 19:28

## 2023-01-01 RX ADMIN — BUDESONIDE AND FORMOTEROL FUMARATE DIHYDRATE 2 PUFF: 160; 4.5 AEROSOL RESPIRATORY (INHALATION) at 06:29

## 2023-01-01 RX ADMIN — ATORVASTATIN CALCIUM 40 MG: 40 TABLET, FILM COATED ORAL at 00:55

## 2023-01-01 RX ADMIN — FENTANYL CITRATE 250 MCG: 50 INJECTION, SOLUTION INTRAMUSCULAR; INTRAVENOUS at 11:24

## 2023-01-01 RX ADMIN — SODIUM CHLORIDE, POTASSIUM CHLORIDE, SODIUM LACTATE AND CALCIUM CHLORIDE: 600; 310; 30; 20 INJECTION, SOLUTION INTRAVENOUS at 11:24

## 2023-01-01 RX ADMIN — LABETALOL HYDROCHLORIDE 400 MG: 200 TABLET, FILM COATED ORAL at 21:49

## 2023-01-01 RX ADMIN — LANSOPRAZOLE 30 MG: 15 TABLET, ORALLY DISINTEGRATING, DELAYED RELEASE ORAL at 05:04

## 2023-01-01 RX ADMIN — HEPARIN SODIUM 40000 UNITS: 1000 INJECTION INTRAVENOUS; SUBCUTANEOUS at 13:38

## 2023-01-01 RX ADMIN — NICOTINE 1 PATCH: 14 PATCH, EXTENDED RELEASE TRANSDERMAL at 09:00

## 2023-01-01 RX ADMIN — MIDAZOLAM 2 MG: 1 INJECTION INTRAMUSCULAR; INTRAVENOUS at 16:09

## 2023-01-01 RX ADMIN — EPINEPHRINE 1 MG: 1 INJECTION, SOLUTION, CONCENTRATE INTRAVENOUS at 23:22

## 2023-01-01 RX ADMIN — HYDROCHLOROTHIAZIDE 25 MG: 25 TABLET ORAL at 08:02

## 2023-01-01 RX ADMIN — HYDROCHLOROTHIAZIDE 25 MG: 25 TABLET ORAL at 08:45

## 2023-01-01 RX ADMIN — SERTRALINE 150 MG: 100 TABLET, FILM COATED ORAL at 09:01

## 2023-01-01 RX ADMIN — SUCRALFATE 1 G: 1 TABLET ORAL at 08:21

## 2023-01-01 RX ADMIN — LANSOPRAZOLE 30 MG: 15 TABLET, ORALLY DISINTEGRATING, DELAYED RELEASE ORAL at 06:00

## 2023-01-01 RX ADMIN — SODIUM BICARBONATE 50 MEQ: 84 INJECTION INTRAVENOUS at 22:57

## 2023-01-01 RX ADMIN — ROCURONIUM BROMIDE 20 MG: 10 SOLUTION INTRAVENOUS at 18:10

## 2023-01-01 RX ADMIN — HEPARIN SODIUM 4000 UNITS: 5000 INJECTION INTRAVENOUS; SUBCUTANEOUS at 14:37

## 2023-01-01 RX ADMIN — SODIUM BICARBONATE 50 MEQ: 84 INJECTION INTRAVENOUS at 22:10

## 2023-01-01 RX ADMIN — SUCRALFATE 1 G: 1 TABLET ORAL at 21:15

## 2023-01-01 RX ADMIN — LABETALOL HYDROCHLORIDE 400 MG: 200 TABLET, FILM COATED ORAL at 09:32

## 2023-01-01 RX ADMIN — LISINOPRIL 40 MG: 10 TABLET ORAL at 08:21

## 2023-01-01 RX ADMIN — PROCHLORPERAZINE EDISYLATE 5 MG: 5 INJECTION INTRAMUSCULAR; INTRAVENOUS at 02:49

## 2023-01-01 RX ADMIN — LABETALOL HYDROCHLORIDE 400 MG: 200 TABLET, FILM COATED ORAL at 10:30

## 2023-01-01 RX ADMIN — Medication 10 ML: at 20:46

## 2023-01-01 RX ADMIN — ATORVASTATIN CALCIUM 40 MG: 40 TABLET, FILM COATED ORAL at 21:05

## 2023-01-01 RX ADMIN — SODIUM BICARBONATE 50 MEQ: 84 INJECTION INTRAVENOUS at 21:15

## 2023-01-01 RX ADMIN — CHLORHEXIDINE GLUCONATE 15 ML: 1.2 SOLUTION ORAL at 05:48

## 2023-01-01 RX ADMIN — CALCIUM CHLORIDE 0.5 G: 100 INJECTION INTRAVENOUS; INTRAVENTRICULAR at 22:15

## 2023-01-01 RX ADMIN — GLYCOPYRROLATE 0.4 MG: 0.2 INJECTION INTRAMUSCULAR; INTRAVENOUS at 20:06

## 2023-01-01 RX ADMIN — EPINEPHRINE 1 MG: 1 INJECTION, SOLUTION, CONCENTRATE INTRAVENOUS at 22:17

## 2023-01-01 RX ADMIN — MORPHINE SULFATE 4 MG: 4 INJECTION, SOLUTION INTRAMUSCULAR; INTRAVENOUS at 11:56

## 2023-01-01 RX ADMIN — SUCRALFATE 1 G: 1 TABLET ORAL at 08:45

## 2023-01-01 RX ADMIN — MUPIROCIN 1 APPLICATION: 20 OINTMENT TOPICAL at 05:48

## 2023-01-01 RX ADMIN — LABETALOL HYDROCHLORIDE 400 MG: 200 TABLET, FILM COATED ORAL at 21:25

## 2023-01-01 RX ADMIN — NICOTINE 1 PATCH: 14 PATCH, EXTENDED RELEASE TRANSDERMAL at 10:22

## 2023-01-01 RX ADMIN — SUCRALFATE 1 G: 1 TABLET ORAL at 17:09

## 2023-01-01 RX ADMIN — SUCRALFATE 1 G: 1 TABLET ORAL at 17:41

## 2023-01-01 RX ADMIN — MUPIROCIN 1 APPLICATION: 20 OINTMENT TOPICAL at 01:14

## 2023-01-01 RX ADMIN — SUCRALFATE 1 G: 1 TABLET ORAL at 21:05

## 2023-01-01 RX ADMIN — SUCRALFATE 1 G: 1 TABLET ORAL at 17:13

## 2023-01-01 RX ADMIN — Medication 10 ML: at 09:01

## 2023-01-01 RX ADMIN — FENTANYL CITRATE 250 MCG: 50 INJECTION, SOLUTION INTRAMUSCULAR; INTRAVENOUS at 16:09

## 2023-01-01 RX ADMIN — SUCRALFATE 1 G: 1 TABLET ORAL at 11:34

## 2023-01-01 RX ADMIN — MIDAZOLAM 3 MG: 1 INJECTION INTRAMUSCULAR; INTRAVENOUS at 11:24

## 2023-01-01 RX ADMIN — Medication 10 ML: at 08:02

## 2023-01-01 RX ADMIN — EPINEPHRINE 1 MG: 1 INJECTION, SOLUTION, CONCENTRATE INTRAVENOUS at 23:03

## 2023-01-01 RX ADMIN — SODIUM BICARBONATE 50 MEQ: 84 INJECTION INTRAVENOUS at 23:02

## 2023-01-01 RX ADMIN — SODIUM BICARBONATE 50 MEQ: 84 INJECTION INTRAVENOUS at 22:52

## 2023-01-01 RX ADMIN — LANSOPRAZOLE 30 MG: 15 TABLET, ORALLY DISINTEGRATING, DELAYED RELEASE ORAL at 06:10

## 2023-01-01 RX ADMIN — EPINEPHRINE 1 MG: 1 INJECTION, SOLUTION, CONCENTRATE INTRAVENOUS at 22:13

## 2023-01-01 RX ADMIN — CALCIUM CHLORIDE 1 G: 100 INJECTION INTRAVENOUS; INTRAVENTRICULAR at 22:27

## 2023-01-01 RX ADMIN — EPINEPHRINE 0.1 MCG: 1 INJECTION, SOLUTION, CONCENTRATE INTRAVENOUS at 19:50

## 2023-01-01 RX ADMIN — FENTANYL CITRATE 250 MCG: 50 INJECTION, SOLUTION INTRAMUSCULAR; INTRAVENOUS at 11:30

## 2023-01-01 RX ADMIN — HEPARIN SODIUM 2000 UNITS: 1000 INJECTION INTRAVENOUS; SUBCUTANEOUS at 00:58

## 2023-01-01 RX ADMIN — ASPIRIN 324 MG: 81 TABLET, CHEWABLE ORAL at 02:53

## 2023-01-01 RX ADMIN — SUCRALFATE 1 G: 1 TABLET ORAL at 17:22

## 2023-01-01 RX ADMIN — MORPHINE SULFATE 2 MG: 2 INJECTION, SOLUTION INTRAMUSCULAR; INTRAVENOUS at 17:58

## 2023-01-01 RX ADMIN — LABETALOL HYDROCHLORIDE 400 MG: 200 TABLET, FILM COATED ORAL at 01:10

## 2023-01-01 RX ADMIN — SERTRALINE 150 MG: 50 TABLET, FILM COATED ORAL at 08:21

## 2023-01-01 RX ADMIN — MILRINONE LACTATE 3 MG: 1 INJECTION, SOLUTION INTRAVENOUS at 23:14

## 2023-01-01 RX ADMIN — AMINOCAPROIC ACID 10 G: 250 INJECTION, SOLUTION INTRAVENOUS at 12:10

## 2023-01-01 RX ADMIN — Medication 10 ML: at 10:25

## 2023-01-01 RX ADMIN — SODIUM CHLORIDE 2000 MG: 900 INJECTION INTRAVENOUS at 05:48

## 2023-01-01 RX ADMIN — SODIUM BICARBONATE 100 MEQ: 84 INJECTION INTRAVENOUS at 23:07

## 2023-01-01 RX ADMIN — PROMETHAZINE HYDROCHLORIDE 25 MG: 25 TABLET ORAL at 15:01

## 2023-01-01 RX ADMIN — FAMOTIDINE 20 MG: 20 TABLET, FILM COATED ORAL at 08:20

## 2023-01-01 RX ADMIN — ASPIRIN 81 MG: 81 TABLET, CHEWABLE ORAL at 09:01

## 2023-01-01 RX ADMIN — ASPIRIN 81 MG: 81 TABLET, CHEWABLE ORAL at 08:20

## 2023-01-01 RX ADMIN — HYDROCODONE BITARTRATE AND ACETAMINOPHEN 1 TABLET: 7.5; 325 TABLET ORAL at 21:49

## 2023-01-01 RX ADMIN — MIDAZOLAM 2 MG: 1 INJECTION INTRAMUSCULAR; INTRAVENOUS at 19:28

## 2023-01-01 RX ADMIN — MUPIROCIN 1 APPLICATION: 20 OINTMENT TOPICAL at 21:07

## 2023-01-01 RX ADMIN — CALCIUM CHLORIDE 0.5 G: 100 INJECTION INTRAVENOUS; INTRAVENTRICULAR at 21:35

## 2023-01-01 RX ADMIN — HYDROCODONE BITARTRATE AND ACETAMINOPHEN 1 TABLET: 7.5; 325 TABLET ORAL at 14:53

## 2023-01-01 RX ADMIN — ATORVASTATIN CALCIUM 40 MG: 40 TABLET, FILM COATED ORAL at 21:16

## 2023-01-01 RX ADMIN — SUCRALFATE 1 G: 1 TABLET ORAL at 11:08

## 2023-01-01 RX ADMIN — SODIUM CHLORIDE 2000 MG: 900 INJECTION INTRAVENOUS at 18:17

## 2023-01-01 RX ADMIN — CHLORHEXIDINE GLUCONATE 15 ML: 1.2 SOLUTION ORAL at 21:06

## 2023-01-01 RX ADMIN — POTASSIUM CHLORIDE 40 MEQ: 1500 TABLET, EXTENDED RELEASE ORAL at 08:20

## 2023-01-01 RX ADMIN — MORPHINE SULFATE 3 MG: 4 INJECTION, SOLUTION INTRAMUSCULAR; INTRAVENOUS at 09:13

## 2023-01-01 RX ADMIN — PROMETHAZINE HYDROCHLORIDE 25 MG: 25 TABLET ORAL at 15:29

## 2023-01-01 RX ADMIN — MORPHINE SULFATE 4 MG: 4 INJECTION, SOLUTION INTRAMUSCULAR; INTRAVENOUS at 02:53

## 2023-01-01 RX ADMIN — LISINOPRIL 40 MG: 40 TABLET ORAL at 10:22

## 2023-01-01 RX ADMIN — Medication 10 ML: at 08:21

## 2023-01-01 RX ADMIN — ATORVASTATIN CALCIUM 40 MG: 40 TABLET, FILM COATED ORAL at 20:48

## 2023-01-01 RX ADMIN — LABETALOL HYDROCHLORIDE 400 MG: 200 TABLET, FILM COATED ORAL at 09:00

## 2023-01-01 RX ADMIN — SUCRALFATE 1 G: 1 TABLET ORAL at 11:10

## 2023-01-01 RX ADMIN — ASPIRIN 81 MG: 81 TABLET, CHEWABLE ORAL at 10:21

## 2023-01-01 RX ADMIN — CALCIUM CHLORIDE 0.5 G: 100 INJECTION INTRAVENOUS; INTRAVENTRICULAR at 21:45

## 2023-01-01 RX ADMIN — EPINEPHRINE 1 MG: 1 INJECTION, SOLUTION, CONCENTRATE INTRAVENOUS at 22:15

## 2023-01-01 RX ADMIN — HEPARIN SODIUM 13 UNITS/KG/HR: 10000 INJECTION, SOLUTION INTRAVENOUS at 20:46

## 2023-01-01 RX ADMIN — HEPARIN SODIUM 10 UNITS/KG/HR: 10000 INJECTION, SOLUTION INTRAVENOUS at 07:16

## 2023-01-01 RX ADMIN — NICOTINE 1 PATCH: 14 PATCH, EXTENDED RELEASE TRANSDERMAL at 08:46

## 2023-01-01 RX ADMIN — ROCURONIUM BROMIDE 30 MG: 10 SOLUTION INTRAVENOUS at 19:28

## 2023-01-01 RX ADMIN — EPINEPHRINE 0.05 MCG: 1 INJECTION, SOLUTION, CONCENTRATE INTRAVENOUS at 19:46

## 2023-01-01 RX ADMIN — LISINOPRIL 40 MG: 10 TABLET ORAL at 08:45

## 2023-01-01 RX ADMIN — MIDAZOLAM HYDROCHLORIDE 2 MG: 1 INJECTION, SOLUTION INTRAMUSCULAR; INTRAVENOUS at 09:47

## 2023-01-01 RX ADMIN — SODIUM BICARBONATE 50 MEQ: 84 INJECTION INTRAVENOUS at 22:32

## 2023-01-01 RX ADMIN — SODIUM CHLORIDE 1 ML/KG/HR: 9 INJECTION, SOLUTION INTRAVENOUS at 11:53

## 2023-01-01 RX ADMIN — HYDROCHLOROTHIAZIDE 25 MG: 25 TABLET ORAL at 08:21

## 2023-01-01 RX ADMIN — NICOTINE 1 PATCH: 14 PATCH, EXTENDED RELEASE TRANSDERMAL at 06:31

## 2023-01-01 RX ADMIN — Medication 10 ML: at 01:06

## 2023-01-01 RX ADMIN — HEPARIN SODIUM 26 UNITS/KG/HR: 10000 INJECTION, SOLUTION INTRAVENOUS at 22:04

## 2023-01-01 RX ADMIN — ASPIRIN 81 MG: 81 TABLET, CHEWABLE ORAL at 08:02

## 2023-01-01 RX ADMIN — CEFAZOLIN 1 G: 1 INJECTION, POWDER, FOR SOLUTION INTRAMUSCULAR; INTRAVENOUS; PARENTERAL at 20:34

## 2023-01-01 RX ADMIN — SUCRALFATE 1 G: 1 TABLET ORAL at 10:30

## 2023-01-01 RX ADMIN — ROCURONIUM BROMIDE 70 MG: 10 SOLUTION INTRAVENOUS at 11:24

## 2023-01-01 RX ADMIN — ATORVASTATIN CALCIUM 40 MG: 40 TABLET, FILM COATED ORAL at 01:10

## 2023-01-01 RX ADMIN — ASPIRIN 81 MG: 81 TABLET, COATED ORAL at 00:55

## 2023-01-01 RX ADMIN — MUPIROCIN 1 APPLICATION: 20 OINTMENT TOPICAL at 01:12

## 2023-01-01 RX ADMIN — LISINOPRIL 40 MG: 40 TABLET ORAL at 09:00

## 2023-01-01 RX ADMIN — PROTAMINE SULFATE 400 MG: 10 INJECTION, SOLUTION INTRAVENOUS at 17:37

## 2023-01-01 RX ADMIN — ROCURONIUM BROMIDE 20 MG: 10 SOLUTION INTRAVENOUS at 15:20

## 2023-01-01 RX ADMIN — HEPARIN SODIUM 2000 UNITS: 1000 INJECTION INTRAVENOUS; SUBCUTANEOUS at 17:07

## 2023-01-01 RX ADMIN — HYDROCHLOROTHIAZIDE 25 MG: 25 TABLET ORAL at 10:23

## 2023-01-01 RX ADMIN — HEPARIN SODIUM 22 UNITS/KG/HR: 10000 INJECTION, SOLUTION INTRAVENOUS at 08:01

## 2023-01-01 RX ADMIN — NITROGLYCERIN 0.4 MG: 0.4 TABLET, ORALLY DISINTEGRATING SUBLINGUAL at 02:49

## 2023-01-01 RX ADMIN — SODIUM BICARBONATE 50 MEQ: 84 INJECTION INTRAVENOUS at 22:15

## 2023-01-01 RX ADMIN — EPINEPHRINE 0.2 MCG/KG/MIN: 1 INJECTION, SOLUTION, CONCENTRATE INTRAVENOUS at 19:55

## 2023-01-01 RX ADMIN — ROCURONIUM BROMIDE 30 MG: 10 SOLUTION INTRAVENOUS at 16:09

## 2023-01-01 RX ADMIN — SODIUM BICARBONATE 50 MEQ: 84 INJECTION INTRAVENOUS at 23:20

## 2023-01-01 RX ADMIN — HYDROCHLOROTHIAZIDE 25 MG: 25 TABLET ORAL at 09:01

## 2023-01-01 RX ADMIN — HEPARIN SODIUM 4000 UNITS: 5000 INJECTION INTRAVENOUS; SUBCUTANEOUS at 00:13

## 2023-01-01 RX ADMIN — SERTRALINE 150 MG: 50 TABLET, FILM COATED ORAL at 08:45

## 2023-01-01 RX ADMIN — ACETAMINOPHEN 650 MG: 325 TABLET ORAL at 20:48

## 2023-01-01 RX ADMIN — EPINEPHRINE 1 MG: 1 INJECTION, SOLUTION, CONCENTRATE INTRAVENOUS at 23:20

## 2023-01-01 RX ADMIN — SUCRALFATE 1 G: 1 TABLET ORAL at 00:55

## 2023-01-01 RX ADMIN — HEPARIN SODIUM 20 UNITS/KG/HR: 10000 INJECTION, SOLUTION INTRAVENOUS at 17:04

## 2023-01-01 RX ADMIN — EPINEPHRINE 1 MG: 1 INJECTION, SOLUTION, CONCENTRATE INTRAVENOUS at 23:05

## 2023-01-01 RX ADMIN — BUDESONIDE AND FORMOTEROL FUMARATE DIHYDRATE 2 PUFF: 160; 4.5 AEROSOL RESPIRATORY (INHALATION) at 18:35

## 2023-01-01 RX ADMIN — SERTRALINE 150 MG: 100 TABLET, FILM COATED ORAL at 08:02

## 2023-01-01 RX ADMIN — Medication 10 ML: at 01:03

## 2023-01-01 RX ADMIN — ETOMIDATE 14 MG: 40 INJECTION, SOLUTION INTRAVENOUS at 11:24

## 2023-01-01 RX ADMIN — HEPARIN SODIUM 25 UNITS/KG/HR: 10000 INJECTION, SOLUTION INTRAVENOUS at 21:36

## 2023-01-01 RX ADMIN — LANSOPRAZOLE 30 MG: 15 TABLET, ORALLY DISINTEGRATING, DELAYED RELEASE ORAL at 05:26

## 2023-01-01 RX ADMIN — NITROGLYCERIN 10 MCG/MIN: 20 INJECTION INTRAVENOUS at 03:51

## 2023-01-01 RX ADMIN — POTASSIUM CHLORIDE 40 MEQ: 1500 TABLET, EXTENDED RELEASE ORAL at 17:22

## 2023-01-01 RX ADMIN — MORPHINE SULFATE 3 MG: 4 INJECTION, SOLUTION INTRAMUSCULAR; INTRAVENOUS at 21:16

## 2023-01-01 RX ADMIN — LABETALOL HYDROCHLORIDE 400 MG: 200 TABLET, FILM COATED ORAL at 09:56

## 2023-01-01 RX ADMIN — ACETAMINOPHEN 325 MG: 325 TABLET ORAL at 06:52

## 2023-01-01 RX ADMIN — HEPARIN SODIUM 26 UNITS/KG/HR: 10000 INJECTION, SOLUTION INTRAVENOUS at 11:31

## 2023-01-01 RX ADMIN — SODIUM BICARBONATE 50 MEQ: 84 INJECTION INTRAVENOUS at 23:08

## 2023-01-01 RX ADMIN — FAMOTIDINE 20 MG: 20 TABLET, FILM COATED ORAL at 17:22

## 2023-01-01 RX ADMIN — MILRINONE LACTATE 3 MG: 1 INJECTION, SOLUTION INTRAVENOUS at 20:03

## 2023-01-01 RX ADMIN — Medication 10 ML: at 21:18

## 2023-01-01 RX ADMIN — SUCRALFATE 1 G: 1 TABLET ORAL at 08:02

## 2023-01-01 RX ADMIN — FAMOTIDINE 20 MG: 20 TABLET, FILM COATED ORAL at 06:30

## 2023-01-01 RX ADMIN — SUCRALFATE 1 G: 1 TABLET ORAL at 13:37

## 2023-01-01 RX ADMIN — SERTRALINE 150 MG: 100 TABLET, FILM COATED ORAL at 10:21

## 2023-01-01 RX ADMIN — MORPHINE SULFATE 2 MG: 2 INJECTION, SOLUTION INTRAMUSCULAR; INTRAVENOUS at 06:12

## 2023-01-01 RX ADMIN — ASPIRIN 81 MG: 81 TABLET, CHEWABLE ORAL at 08:45

## 2023-01-01 RX ADMIN — HEPARIN SODIUM 10 UNITS/KG/HR: 10000 INJECTION, SOLUTION INTRAVENOUS at 00:13

## 2023-01-01 RX ADMIN — EPINEPHRINE 1 MG: 1 INJECTION, SOLUTION, CONCENTRATE INTRAVENOUS at 23:09

## 2023-01-01 RX ADMIN — SODIUM BICARBONATE 50 MEQ: 84 INJECTION INTRAVENOUS at 23:01

## 2023-01-01 RX ADMIN — CHOLECALCIFEROL CAP 1.25 MG (50000 UNIT) 50000 UNITS: 1.25 CAP at 10:22

## 2023-01-01 RX ADMIN — HEPARIN SODIUM 2000 UNITS: 1000 INJECTION INTRAVENOUS; SUBCUTANEOUS at 11:08

## 2023-01-01 RX ADMIN — NICOTINE 1 PATCH: 14 PATCH, EXTENDED RELEASE TRANSDERMAL at 08:02

## 2023-01-01 RX ADMIN — SUCRALFATE 1 G: 1 TABLET ORAL at 09:01

## 2023-01-01 RX ADMIN — LABETALOL HYDROCHLORIDE 400 MG: 200 TABLET, FILM COATED ORAL at 21:06

## 2023-01-01 RX ADMIN — HEPARIN SODIUM 50000 UNITS: 1000 INJECTION INTRAVENOUS; SUBCUTANEOUS at 17:39

## 2023-01-01 RX ADMIN — ROCURONIUM BROMIDE 30 MG: 10 SOLUTION INTRAVENOUS at 13:20

## 2023-01-01 RX ADMIN — Medication 10 ML: at 21:08

## 2023-01-01 RX ADMIN — HEPARIN SODIUM 4000 UNITS: 5000 INJECTION INTRAVENOUS; SUBCUTANEOUS at 09:26

## 2023-01-01 RX ADMIN — CALCIUM CHLORIDE 1 G: 100 INJECTION INTRAVENOUS; INTRAVENTRICULAR at 17:37

## 2023-01-01 RX ADMIN — Medication 10 ML: at 08:47

## 2023-01-01 RX ADMIN — EPINEPHRINE 1 MG: 1 INJECTION, SOLUTION, CONCENTRATE INTRAVENOUS at 22:27

## 2023-01-01 RX ADMIN — FAMOTIDINE 20 MG: 20 TABLET, FILM COATED ORAL at 17:41

## 2023-01-01 RX ADMIN — ATORVASTATIN CALCIUM 40 MG: 40 TABLET, FILM COATED ORAL at 03:17

## 2023-01-01 RX ADMIN — LABETALOL HYDROCHLORIDE 400 MG: 200 TABLET, FILM COATED ORAL at 11:10

## 2023-01-01 RX ADMIN — MORPHINE SULFATE 3 MG: 4 INJECTION, SOLUTION INTRAMUSCULAR; INTRAVENOUS at 15:01

## 2023-01-01 RX ADMIN — NOREPINEPHRINE BITARTRATE 0.1 MCG/KG/MIN: 0.03 INJECTION, SOLUTION INTRAVENOUS at 16:49

## 2023-03-03 ENCOUNTER — OFFICE VISIT (OUTPATIENT)
Dept: PULMONOLOGY | Facility: CLINIC | Age: 53
End: 2023-03-03
Payer: COMMERCIAL

## 2023-03-03 VITALS
SYSTOLIC BLOOD PRESSURE: 180 MMHG | DIASTOLIC BLOOD PRESSURE: 120 MMHG | OXYGEN SATURATION: 97 % | RESPIRATION RATE: 18 BRPM | TEMPERATURE: 98 F | HEART RATE: 99 BPM | WEIGHT: 218 LBS | BODY MASS INDEX: 34.14 KG/M2

## 2023-03-03 DIAGNOSIS — F17.210 CIGARETTE NICOTINE DEPENDENCE WITHOUT COMPLICATION: ICD-10-CM

## 2023-03-03 DIAGNOSIS — G47.33 OSA (OBSTRUCTIVE SLEEP APNEA): ICD-10-CM

## 2023-03-03 DIAGNOSIS — E66.9 OBESITY (BMI 30-39.9): ICD-10-CM

## 2023-03-03 DIAGNOSIS — R06.02 SHORTNESS OF BREATH: Primary | ICD-10-CM

## 2023-03-03 PROCEDURE — 99204 OFFICE O/P NEW MOD 45 MIN: CPT | Performed by: NURSE PRACTITIONER

## 2023-03-03 NOTE — PROGRESS NOTES
"Chief Complaint  Shortness of Breath    Subjective        Ronaldo Spain presents to [facility] PULMONARY & CRITICAL CARE MEDICINE  History of Present Illness     Mr. Spain is a 53 year old male with a medical history significant for hypertension.    He presents today for evaluation of shortness of breath.  He states that he has previously seen Dr. Michelle and that he has had 2 sleep studies done in the past that showed sleep apnea.  He is currently on a cpap at night.  He reports that he does have shortness of breath and a cough.  He states that he often feels that he can't get a breath of air.  He states that he has also has coughing.  He state that he was recently treated for bronchitis and was told that he more than likely tore a muscle in his back and left side from coughing.  He has had left side/mid back pain for several weeks now.  He reports that it is not getting any better.  He states that it radiates into his back and that sitting is the most bothersome position.  He reports that he did take a flexaril that he had at home and this seemed to help some but that he is unable to take them and work. He currently worked EMS.  He states that he is a current smoker of 1 ppd and has smoked for 40 years.        Objective   Vital Signs:  BP (!) 180/120   Pulse 99   Temp 98 °F (36.7 °C) (Temporal)   Resp 18   Wt 98.9 kg (218 lb)   SpO2 97%   BMI 34.14 kg/m²   Estimated body mass index is 34.14 kg/m² as calculated from the following:    Height as of 3/12/22: 170.2 cm (67\").    Weight as of this encounter: 98.9 kg (218 lb).       BMI is >= 30 and <35. (Class 1 Obesity). The following options were offered after discussion;: exercise counseling/recommendations and nutrition counseling/recommendations      Physical Exam     GENERAL APPEARANCE: Well developed, well nourished, alert and cooperative, and appears to be in no acute distress.    HEAD: normocephalic. Atraumatic.    EYES: PERRL, " EOMI. Vision is grossly intact.    THROAT: Oral cavity and pharynx normal. No inflammation, swelling, exudate, or lesions.     NECK: Neck supple.  No thyromegaly.    CARDIAC: Normal S1 and S2. No S3, S4 or murmurs. Rhythm is regular.     RESPIRATORY:Bilateral air entry positive. Bilateral diminished breath sounds. No wheezing, crackles or rhonchi noted.    GI: Positive bowel sounds. Soft, nondistended, nontender.     MUSCULOSKELETAL: No significant deformity or joint abnormality. No edema. Peripheral pulses intact. No varicosities.    NEUROLOGICAL: Strength and sensation symmetric and intact throughout.     PSYCHIATRIC: The mental examination revealed the patient was oriented to person, place, and time.     Result Review :  The following data was reviewed by: TATUM Coburn on 03/03/2023:  Common labs    Common Labs 6/22/22 6/22/22 6/22/22    1335 1335 1335   Glucose  89    BUN  12    Creatinine  0.99    Sodium  141    Potassium  4.4    Chloride  105    Calcium  9.5    Albumin  4.40    Total Bilirubin  0.5    Alkaline Phosphatase  106    AST (SGOT)  25    ALT (SGPT)  22    WBC   7.81   Hemoglobin   18.6 (A)   Hematocrit   54.7 (A)   Platelets   243   Microalbumin, Urine 7.9     (A) Abnormal value       Comments are available for some flowsheets but are not being displayed.                    Assessment and Plan   Diagnoses and all orders for this visit:    1. Shortness of breath (Primary)  -     Full Pulmonary Function Test With Bronchodilator; Future    2. JAKE (obstructive sleep apnea)  -     Miscellaneous DME  -     Polysomnography 4 or More Parameters With CPAP; Future    3. Obesity (BMI 30-39.9)    4. Cigarette nicotine dependence without complication    Other orders  -     benzonatate (TESSALON) 200 MG capsule; Take 1 capsule by mouth 3 (Three) Times a Day As Needed for Cough.  Dispense: 42 capsule; Refill: 3  -     Budeson-Glycopyrrol-Formoterol ([employer]) 160-9-4.8 MCG/ACT aerosol  inhaler; Inhale 2 puffs 2 (Two) Times a Day.  Dispense: 10.7 g; Refill: 5  -     TiZANidine (Zanaflex) 4 MG capsule; Take 1 capsule by mouth 3 (Three) Times a Day.  Dispense: 90 capsule; Refill: 1  -     albuterol sulfate  (90 Base) MCG/ACT inhaler; Inhale 2 puffs Every 4 (Four) Hours As Needed for Wheezing.  Dispense: 6.7 g; Refill: 5        Will order a PFT to assess lung function.    Will start him on albuterol PRN.  Will start him on Breztri BID.      Sent in tessalon for his cough.  He has lidocaine patches at home. I instructed him to use these on his left side where he has pain.  Also send in xanaflex.       CT Chest was reviewed.  Will continue with annual lung cancer screening in February 2024.    Will send in cpap supplies to DME company.  Will order an in lab sleep study with cpap to assess pressure settings.  He feels that his settings are not correct and that his last sleep study was inaccurate.      Ronaldo Spain  reports that he has been smoking cigarettes. He has a 35.00 pack-year smoking history. He has never used smokeless tobacco.. I have educated him on the risk of diseases from using tobacco products such as cancer, COPD and heart disease.     He is not interested in cessation at this time.            Follow Up   Return in about 2 months (around 5/3/2023).  Patient was given instructions and counseling regarding his condition or for health maintenance advice. Please see specific information pulled into the AVS if appropriate.

## 2023-03-07 RX ORDER — TIZANIDINE HYDROCHLORIDE 4 MG/1
4 CAPSULE, GELATIN COATED ORAL 3 TIMES DAILY
Qty: 90 CAPSULE | Refills: 1 | Status: SHIPPED | OUTPATIENT
Start: 2023-03-07

## 2023-03-07 RX ORDER — BUDESONIDE, GLYCOPYRROLATE, AND FORMOTEROL FUMARATE 160; 9; 4.8 UG/1; UG/1; UG/1
2 AEROSOL, METERED RESPIRATORY (INHALATION) 2 TIMES DAILY
Qty: 10.7 G | Refills: 5 | Status: SHIPPED | OUTPATIENT
Start: 2023-03-07 | End: 2023-04-05

## 2023-03-07 RX ORDER — BENZONATATE 200 MG/1
200 CAPSULE ORAL 3 TIMES DAILY PRN
Qty: 42 CAPSULE | Refills: 3 | Status: SHIPPED | OUTPATIENT
Start: 2023-03-07

## 2023-03-08 RX ORDER — ALBUTEROL SULFATE 90 UG/1
2 AEROSOL, METERED RESPIRATORY (INHALATION) EVERY 4 HOURS PRN
Qty: 6.7 G | Refills: 5 | Status: SHIPPED | OUTPATIENT
Start: 2023-03-08

## 2023-04-04 ENCOUNTER — HOSPITAL ENCOUNTER (OUTPATIENT)
Dept: RESPIRATORY THERAPY | Facility: HOSPITAL | Age: 53
Discharge: HOME | End: 2023-04-04
Admitting: NURSE PRACTITIONER
Payer: COMMERCIAL

## 2023-04-04 VITALS — OXYGEN SATURATION: 97 % | RESPIRATION RATE: 16 BRPM | HEART RATE: 85 BPM

## 2023-04-04 DIAGNOSIS — R06.02 SHORTNESS OF BREATH: ICD-10-CM

## 2023-04-04 PROCEDURE — 94799 UNLISTED PULMONARY SVC/PX: CPT

## 2023-04-04 PROCEDURE — 94726 PLETHYSMOGRAPHY LUNG VOLUMES: CPT

## 2023-04-04 PROCEDURE — 94060 EVALUATION OF WHEEZING: CPT

## 2023-04-04 PROCEDURE — 94729 DIFFUSING CAPACITY: CPT

## 2023-04-04 PROCEDURE — 94640 AIRWAY INHALATION TREATMENT: CPT

## 2023-04-04 PROCEDURE — 94729 DIFFUSING CAPACITY: CPT | Performed by: INTERNAL MEDICINE

## 2023-04-04 PROCEDURE — 94060 EVALUATION OF WHEEZING: CPT | Performed by: INTERNAL MEDICINE

## 2023-04-04 PROCEDURE — 94760 N-INVAS EAR/PLS OXIMETRY 1: CPT

## 2023-04-04 PROCEDURE — 94726 PLETHYSMOGRAPHY LUNG VOLUMES: CPT | Performed by: INTERNAL MEDICINE

## 2023-04-04 RX ORDER — ALBUTEROL SULFATE 2.5 MG/3ML
2.5 SOLUTION RESPIRATORY (INHALATION) ONCE
Status: COMPLETED | OUTPATIENT
Start: 2023-04-04 | End: 2023-04-04

## 2023-04-04 RX ADMIN — ALBUTEROL SULFATE 2.5 MG: 2.5 SOLUTION RESPIRATORY (INHALATION) at 10:33

## 2023-04-05 ENCOUNTER — DOCUMENTATION (OUTPATIENT)
Dept: PULMONOLOGY | Facility: CLINIC | Age: 53
End: 2023-04-05
Payer: COMMERCIAL

## 2023-04-05 ENCOUNTER — TELEPHONE (OUTPATIENT)
Dept: PULMONOLOGY | Facility: CLINIC | Age: 53
End: 2023-04-05
Payer: COMMERCIAL

## 2023-04-05 RX ORDER — BUDESONIDE AND FORMOTEROL FUMARATE DIHYDRATE 80; 4.5 UG/1; UG/1
2 AEROSOL RESPIRATORY (INHALATION) 2 TIMES DAILY
Qty: 10.2 G | Refills: 5 | Status: SHIPPED | OUTPATIENT
Start: 2023-04-05

## 2023-04-05 NOTE — TELEPHONE ENCOUNTER
----- Message from TATUM Coburn sent at 4/5/2023  9:11 AM EDT -----  Will you let her him know that he does have some restriction on his PFT.  I do not see evidence of COPD.  Restriction could be due to some scarring of his lung tissue.  He can continue the Breztri inhaler if this is helping.     ----- Message -----  From: Juanjo Yuen MD  Sent: 4/4/2023   8:29 PM EDT  To: TATUM Coburn

## 2023-04-05 NOTE — PROGRESS NOTES
PFT was reviewed. restriction is noted.  We will continue Breztri if their is symptomatic improvement.  No evidence of ILD on previous chest imaging. Some scarring is noted.

## 2023-05-04 ENCOUNTER — OFFICE VISIT (OUTPATIENT)
Dept: PULMONOLOGY | Facility: CLINIC | Age: 53
End: 2023-05-04
Payer: COMMERCIAL

## 2023-05-04 VITALS
BODY MASS INDEX: 33.9 KG/M2 | HEART RATE: 88 BPM | OXYGEN SATURATION: 98 % | TEMPERATURE: 98 F | HEIGHT: 67 IN | SYSTOLIC BLOOD PRESSURE: 190 MMHG | DIASTOLIC BLOOD PRESSURE: 120 MMHG | RESPIRATION RATE: 18 BRPM | WEIGHT: 216 LBS

## 2023-05-04 DIAGNOSIS — J98.4 RESTRICTIVE LUNG DISEASE: Primary | ICD-10-CM

## 2023-05-04 DIAGNOSIS — F17.210 CIGARETTE NICOTINE DEPENDENCE WITHOUT COMPLICATION: ICD-10-CM

## 2023-05-04 DIAGNOSIS — R06.02 SHORTNESS OF BREATH: ICD-10-CM

## 2023-05-04 DIAGNOSIS — G47.33 OSA (OBSTRUCTIVE SLEEP APNEA): ICD-10-CM

## 2023-05-04 DIAGNOSIS — E66.9 OBESITY (BMI 30-39.9): ICD-10-CM

## 2023-05-04 DIAGNOSIS — R53.83 FATIGUE, UNSPECIFIED TYPE: ICD-10-CM

## 2023-05-04 RX ORDER — OMEPRAZOLE 20 MG/1
20 TABLET, DELAYED RELEASE ORAL
COMMUNITY

## 2023-05-04 RX ORDER — HYDROCODONE BITARTRATE AND ACETAMINOPHEN 7.5; 325 MG/1; MG/1
TABLET ORAL EVERY 6 HOURS
COMMUNITY

## 2023-05-04 RX ORDER — LISINOPRIL 20 MG/1
20 TABLET ORAL DAILY
COMMUNITY

## 2023-05-04 RX ORDER — DICYCLOMINE HCL 20 MG
20 TABLET ORAL
COMMUNITY

## 2023-05-04 RX ORDER — LEVOFLOXACIN 500 MG/1
TABLET, FILM COATED ORAL EVERY 24 HOURS
COMMUNITY

## 2023-05-04 NOTE — PROGRESS NOTES
"Chief Complaint  Shortness of Breath    Subjective        Ronaldo Spain presents to [facility] PULMONARY & CRITICAL CARE MEDICINE  History of Present Illness     Mr. Spain is a 53 year old male with a medical history significant for MI, Crohn's disease, hypertension, lupus and sleep apnea.    He presents today for follow up on shortness of breath. He reports that his breathing is about the same.  He states that the inhalers that he was prescribed did not improve his shortness of breath. His shortness of breath is worse with exertion.  He states that he has severe fatigue.  He does have sleep apnea and is on a cpap. He is currently waiting for insurance approval to schedule an in lab sleep titration study.  He tells me that his medication for his crohn's has recently changed to Entivyo.      Objective   Vital Signs:  BP (!) 190/120   Pulse 88   Temp 98 °F (36.7 °C) (Temporal)   Resp 18   Ht 170.2 cm (67\")   Wt 98 kg (216 lb)   SpO2 98%   BMI 33.83 kg/m²   Estimated body mass index is 33.83 kg/m² as calculated from the following:    Height as of this encounter: 170.2 cm (67\").    Weight as of this encounter: 98 kg (216 lb).       BMI is >= 30 and <35. (Class 1 Obesity). The following options were offered after discussion;: exercise counseling/recommendations and nutrition counseling/recommendations      Physical Exam     GENERAL APPEARANCE: Well developed, well nourished, alert and cooperative, and appears to be in no acute distress.    HEAD: normocephalic. Atraumatic.    EYES: PERRL, EOMI. Vision is grossly intact.    THROAT: Oral cavity and pharynx normal. No inflammation, swelling, exudate, or lesions.     NECK: Neck supple.  No thyromegaly.    CARDIAC: Normal S1 and S2. No S3, S4 or murmurs. Rhythm is regular.     RESPIRATORY:Bilateral air entry positive. Bilateral diminished breath sounds. No wheezing, crackles or rhonchi noted.    GI: Positive bowel sounds. Soft, nondistended, " nontender.     MUSCULOSKELETAL: No significant deformity or joint abnormality. No edema. Peripheral pulses intact. No varicosities.    NEUROLOGICAL: Strength and sensation symmetric and intact throughout.     PSYCHIATRIC: The mental examination revealed the patient was oriented to person, place, and time.     Result Review :  The following data was reviewed by: TATUM Coburn on 05/04/2023:  Common labs        6/22/2022    13:35   Common Labs   Glucose 89     BUN 12     Creatinine 0.99     Sodium 141     Potassium 4.4     Chloride 105     Calcium 9.5     Albumin 4.40     Total Bilirubin 0.5     Alkaline Phosphatase 106     AST (SGOT) 25     ALT (SGPT) 22     WBC 7.81     Hemoglobin 18.6     Hematocrit 54.7     Platelets 243     Microalbumin, Urine 7.9                    Assessment and Plan   Diagnoses and all orders for this visit:    1. Restrictive lung disease (Primary)  -     CT chest hi resolution; Future    2. Shortness of breath  -     Adult Transthoracic Echo Complete W/ Cont if Necessary Per Protocol; Future  -     TSH; Future  -     T4, Free; Future  -     Vitamin D 25 Hydroxy; Future  -     Vitamin B12; Future  -     Comprehensive Metabolic Panel; Future  -     CBC & Differential; Future    3. Fatigue, unspecified type  -     TSH; Future  -     T4, Free; Future  -     Vitamin D 25 Hydroxy; Future  -     Vitamin B12; Future  -     Comprehensive Metabolic Panel; Future  -     CBC & Differential; Future    4. Obesity (BMI 30-39.9)    5. JAKE (obstructive sleep apnea)    6. Cigarette nicotine dependence without complication         We discussed several possible contributing factors for his fatigue including his sleep apnea, weight and medication for crohn's.  We are in the process of getting him scheduled for an in lab sleep titration study.    Ordered TSH, free T3, Vitamin D, Vitamin B, CBC and CMP.    Will continue albuterol as needed.  PFT showed no obstruction but did show restriction.    Will  order Hi resolution CT Chest for evaluation of lung parenchyma.        Ronaldo Spain  reports that he has been smoking cigarettes. He started smoking about 40 years ago. He has a 40.00 pack-year smoking history. He has never used smokeless tobacco.. I have educated him on the risk of diseases from using tobacco products such as cancer, COPD and heart disease.     I advised him to quit and he is not interested at this time.             Follow Up   Return in about 2 months (around 7/4/2023).  Patient was given instructions and counseling regarding his condition or for health maintenance advice. Please see specific information pulled into the AVS if appropriate.

## 2023-05-18 ENCOUNTER — TELEPHONE (OUTPATIENT)
Dept: PULMONOLOGY | Facility: CLINIC | Age: 53
End: 2023-05-18
Payer: COMMERCIAL

## 2023-05-18 RX ORDER — ERGOCALCIFEROL 1.25 MG/1
50000 CAPSULE ORAL WEEKLY
Qty: 5 CAPSULE | Refills: 3 | Status: SHIPPED | OUTPATIENT
Start: 2023-05-18

## 2023-05-18 NOTE — TELEPHONE ENCOUNTER
----- Message from TATUM Coburn sent at 5/18/2023  4:05 PM EDT -----  Will you let him know that his lab work looked good except his vitamin D was low.  I sent in a prescription for replacement.  ----- Message -----  From: Johnathan Cortez Incoming  Sent: 5/16/2023  10:11 AM EDT  To: TATUM Coburn

## 2023-06-06 ENCOUNTER — HOSPITAL ENCOUNTER (OUTPATIENT)
Dept: CARDIOLOGY | Facility: HOSPITAL | Age: 53
Discharge: HOME | End: 2023-06-06
Payer: COMMERCIAL

## 2023-06-06 ENCOUNTER — HOSPITAL ENCOUNTER (OUTPATIENT)
Dept: CT IMAGING | Facility: HOSPITAL | Age: 53
Discharge: HOME | End: 2023-06-06
Payer: COMMERCIAL

## 2023-06-06 DIAGNOSIS — R06.02 SHORTNESS OF BREATH: ICD-10-CM

## 2023-06-06 DIAGNOSIS — J98.4 RESTRICTIVE LUNG DISEASE: ICD-10-CM

## 2023-06-06 LAB
BH CV ECHO MEAS - ACS: 1.8 CM
BH CV ECHO MEAS - AO MAX PG: 4.9 MMHG
BH CV ECHO MEAS - AO MEAN PG: 3 MMHG
BH CV ECHO MEAS - AO ROOT DIAM: 3.9 CM
BH CV ECHO MEAS - AO V2 MAX: 111 CM/SEC
BH CV ECHO MEAS - AO V2 VTI: 23.2 CM
BH CV ECHO MEAS - EDV(CUBED): 118.4 ML
BH CV ECHO MEAS - EDV(MOD-SP2): 79.8 ML
BH CV ECHO MEAS - EDV(MOD-SP4): 83.2 ML
BH CV ECHO MEAS - EF(MOD-BP): 49.9 %
BH CV ECHO MEAS - EF(MOD-SP2): 43.5 %
BH CV ECHO MEAS - EF(MOD-SP4): 53.5 %
BH CV ECHO MEAS - ESV(CUBED): 52.3 ML
BH CV ECHO MEAS - ESV(MOD-SP2): 45.1 ML
BH CV ECHO MEAS - ESV(MOD-SP4): 38.7 ML
BH CV ECHO MEAS - FS: 23.8 %
BH CV ECHO MEAS - IVS/LVPW: 0.61 CM
BH CV ECHO MEAS - IVSD: 0.95 CM
BH CV ECHO MEAS - LA DIMENSION: 3.7 CM
BH CV ECHO MEAS - LAT PEAK E' VEL: 4.5 CM/SEC
BH CV ECHO MEAS - LV DIASTOLIC VOL/BSA (35-75): 39.8 CM2
BH CV ECHO MEAS - LV MASS(C)D: 240.6 GRAMS
BH CV ECHO MEAS - LV SYSTOLIC VOL/BSA (12-30): 18.5 CM2
BH CV ECHO MEAS - LVIDD: 4.9 CM
BH CV ECHO MEAS - LVIDS: 3.7 CM
BH CV ECHO MEAS - LVOT AREA: 3.1 CM2
BH CV ECHO MEAS - LVOT DIAM: 2 CM
BH CV ECHO MEAS - LVPWD: 1.1 CM
BH CV ECHO MEAS - MED PEAK E' VEL: 4.9 CM/SEC
BH CV ECHO MEAS - MV A MAX VEL: 55.7 CM/SEC
BH CV ECHO MEAS - MV E MAX VEL: 103 CM/SEC
BH CV ECHO MEAS - MV E/A: 1.85
BH CV ECHO MEAS - PA ACC TIME: 0.08 SEC
BH CV ECHO MEAS - PA PR(ACCEL): 42.6 MMHG
BH CV ECHO MEAS - RAP SYSTOLE: 10 MMHG
BH CV ECHO MEAS - RVSP: 17.5 MMHG
BH CV ECHO MEAS - SI(MOD-SP2): 16.6 ML/M2
BH CV ECHO MEAS - SI(MOD-SP4): 21.3 ML/M2
BH CV ECHO MEAS - SV(MOD-SP2): 34.7 ML
BH CV ECHO MEAS - SV(MOD-SP4): 44.5 ML
BH CV ECHO MEAS - TR MAX PG: 7.5 MMHG
BH CV ECHO MEAS - TR MAX VEL: 137 CM/SEC
BH CV ECHO MEASUREMENTS AVERAGE E/E' RATIO: 21.91
LEFT ATRIUM VOLUME INDEX: 23.4 ML/M2

## 2023-06-06 PROCEDURE — 93306 TTE W/DOPPLER COMPLETE: CPT

## 2023-06-06 PROCEDURE — 71250 CT THORAX DX C-: CPT

## 2023-06-06 PROCEDURE — 71250 CT THORAX DX C-: CPT | Performed by: RADIOLOGY

## 2023-08-21 ENCOUNTER — TRANSCRIBE ORDERS (OUTPATIENT)
Dept: ADMINISTRATIVE | Facility: HOSPITAL | Age: 53
End: 2023-08-21
Payer: COMMERCIAL

## 2023-08-21 DIAGNOSIS — R51.9 ACUTE NONINTRACTABLE HEADACHE, UNSPECIFIED HEADACHE TYPE: Primary | ICD-10-CM

## 2023-09-01 ENCOUNTER — HOSPITAL ENCOUNTER (OUTPATIENT)
Dept: MRI IMAGING | Facility: HOSPITAL | Age: 53
Discharge: HOME | End: 2023-09-01
Admitting: FAMILY MEDICINE
Payer: COMMERCIAL

## 2023-09-01 DIAGNOSIS — R51.9 ACUTE NONINTRACTABLE HEADACHE, UNSPECIFIED HEADACHE TYPE: ICD-10-CM

## 2023-09-01 LAB — CREAT BLDA-MCNC: 1.3 MG/DL (ref 0.6–1.3)

## 2023-09-01 PROCEDURE — A9577 INJ MULTIHANCE: HCPCS | Performed by: FAMILY MEDICINE

## 2023-09-01 PROCEDURE — 70553 MRI BRAIN STEM W/O & W/DYE: CPT

## 2023-09-01 PROCEDURE — 0 GADOBENATE DIMEGLUMINE 529 MG/ML SOLUTION: Performed by: FAMILY MEDICINE

## 2023-09-01 PROCEDURE — 82565 ASSAY OF CREATININE: CPT

## 2023-09-01 RX ADMIN — GADOBENATE DIMEGLUMINE 19 ML: 529 INJECTION, SOLUTION INTRAVENOUS at 10:40

## 2023-09-29 DIAGNOSIS — J84.9 ILD (INTERSTITIAL LUNG DISEASE): Primary | ICD-10-CM

## 2023-10-02 RX ORDER — TIZANIDINE 4 MG/1
TABLET ORAL
Qty: 90 TABLET | Refills: 1 | Status: SHIPPED | OUTPATIENT
Start: 2023-10-02

## 2023-10-11 ENCOUNTER — OFFICE VISIT (OUTPATIENT)
Dept: PULMONOLOGY | Facility: CLINIC | Age: 53
End: 2023-10-11
Payer: COMMERCIAL

## 2023-10-11 VITALS
TEMPERATURE: 98 F | WEIGHT: 202 LBS | HEIGHT: 67 IN | HEART RATE: 76 BPM | OXYGEN SATURATION: 98 % | BODY MASS INDEX: 31.71 KG/M2 | DIASTOLIC BLOOD PRESSURE: 120 MMHG | SYSTOLIC BLOOD PRESSURE: 200 MMHG | RESPIRATION RATE: 18 BRPM

## 2023-10-11 DIAGNOSIS — J84.9 ILD (INTERSTITIAL LUNG DISEASE): Primary | ICD-10-CM

## 2023-10-11 DIAGNOSIS — F17.210 CIGARETTE NICOTINE DEPENDENCE WITHOUT COMPLICATION: ICD-10-CM

## 2023-10-11 DIAGNOSIS — E66.9 OBESITY (BMI 30-39.9): ICD-10-CM

## 2023-10-11 DIAGNOSIS — G47.33 OSA (OBSTRUCTIVE SLEEP APNEA): ICD-10-CM

## 2023-10-11 PROCEDURE — 99214 OFFICE O/P EST MOD 30 MIN: CPT | Performed by: NURSE PRACTITIONER

## 2023-10-11 RX ORDER — PROMETHAZINE HYDROCHLORIDE 25 MG/1
25 TABLET ORAL EVERY 6 HOURS PRN
Qty: 90 TABLET | Refills: 1 | Status: SHIPPED | OUTPATIENT
Start: 2023-10-11

## 2023-10-11 RX ORDER — BENZONATATE 200 MG/1
200 CAPSULE ORAL 3 TIMES DAILY PRN
Qty: 42 CAPSULE | Refills: 3 | Status: SHIPPED | OUTPATIENT
Start: 2023-10-11

## 2023-10-11 NOTE — PROGRESS NOTES
"Chief Complaint  ILD (interstitial lung disease)    Subjective        Ronaldo Spain presents to [facility] PULMONARY & CRITICAL CARE MEDICINE  History of Present Illness    Mr. Spain is a  53 year old male with a medical history significant for hypertension, crohn's disease, sleep apnea and lupus.    He presents today for follow up on shortness of breath and ILD.  He states that he has been doing better.  He reports that his breathing has improved and is back to his baseline. He states that he was diagnosed with 2 ulcers and started on carafate and since then his breathing has improved.  He continues use albuterol as needed.  He has not yet been set up with his bipap.  He is a current smoker.    Objective   Vital Signs:  BP (!) 200/120   Pulse 76   Temp 98 øF (36.7 øC) (Temporal)   Resp 18   Ht 170.2 cm (67.01\")   Wt 91.6 kg (202 lb)   SpO2 98%   BMI 31.63 kg/mý   Estimated body mass index is 31.63 kg/mý as calculated from the following:    Height as of this encounter: 170.2 cm (67.01\").    Weight as of this encounter: 91.6 kg (202 lb).               Physical Exam     GENERAL APPEARANCE: Well developed, well nourished, alert and cooperative, and appears to be in no acute distress.    HEAD: normocephalic. Atraumatic.    EYES: PERRL, EOMI. Vision is grossly intact.    THROAT: Oral cavity and pharynx normal. No inflammation, swelling, exudate, or lesions.     NECK: Neck supple.  No thyromegaly.    CARDIAC: Normal S1 and S2. No S3, S4 or murmurs. Rhythm is regular.     RESPIRATORY:Bilateral air entry positive. Bilateral diminished breath sounds. No wheezing, crackles or rhonchi noted.    GI: Positive bowel sounds. Soft, nondistended, nontender.     MUSCULOSKELETAL: No significant deformity or joint abnormality. No edema. Peripheral pulses intact. No varicosities.    NEUROLOGICAL: Strength and sensation symmetric and intact throughout.     PSYCHIATRIC: The mental examination revealed the " patient was oriented to person, place, and time.     Result Review :  The following data was reviewed by: TATUM Coburn on 10/11/2023:  Common labs          9/1/2023    10:06   Common Labs   Creatinine 1.30                 Assessment and Plan   Diagnoses and all orders for this visit:    1. ILD (interstitial lung disease) (Primary)    2. Obesity (BMI 30-39.9)    3. JAKE (obstructive sleep apnea)    4. Cigarette nicotine dependence without complication    Other orders  -     promethazine (PHENERGAN) 25 MG tablet; Take 1 tablet by mouth Every 6 (Six) Hours As Needed for Nausea or Vomiting.  Dispense: 90 tablet; Refill: 1  -     benzonatate (TESSALON) 200 MG capsule; Take 1 capsule by mouth 3 (Three) Times a Day As Needed for Cough.  Dispense: 42 capsule; Refill: 3        Chest imaging is notable for ground glass opacities with peripheral dominance patter of fibrosis with subpleural involvement.  This is consistent with interstitial pneumonia or autoimmune features.    Hi resolution CT Chest was ordered and reviewed.   Discussed CT Chest with Dr. Nieves.      Continue albuterol as needed.      He has not yet received his bipap.    Ronaldo Spain  reports that he has been smoking cigarettes. He started smoking about 40 years ago. He has a 40.00 pack-year smoking history. He has been exposed to tobacco smoke. He has never used smokeless tobacco.. I have educated him on the risk of diseases from using tobacco products such as cancer, COPD, and heart disease.     I advised him to quit and he is not willing to quit.           Follow Up   Return in about 6 months (around 4/11/2024).  Patient was given instructions and counseling regarding his condition or for health maintenance advice. Please see specific information pulled into the AVS if appropriate.

## 2023-10-12 ENCOUNTER — TRANSCRIBE ORDERS (OUTPATIENT)
Dept: ADMINISTRATIVE | Facility: HOSPITAL | Age: 53
End: 2023-10-12
Payer: COMMERCIAL

## 2023-10-12 DIAGNOSIS — M25.561 ACUTE PAIN OF RIGHT KNEE: Primary | ICD-10-CM

## 2023-10-22 ENCOUNTER — HOSPITAL ENCOUNTER (OUTPATIENT)
Dept: MRI IMAGING | Facility: HOSPITAL | Age: 53
Discharge: HOME | End: 2023-10-22
Admitting: STUDENT IN AN ORGANIZED HEALTH CARE EDUCATION/TRAINING PROGRAM
Payer: COMMERCIAL

## 2023-10-22 DIAGNOSIS — M25.561 ACUTE PAIN OF RIGHT KNEE: ICD-10-CM

## 2023-10-22 PROCEDURE — 73721 MRI JNT OF LWR EXTRE W/O DYE: CPT | Performed by: RADIOLOGY

## 2023-10-22 PROCEDURE — 73721 MRI JNT OF LWR EXTRE W/O DYE: CPT

## 2023-10-25 NOTE — TELEPHONE ENCOUNTER
Caller: [employer]    Relationship: Other    Best call back number: [phone]     What form or medical record are you requesting:  OFFICE NOTES FROM 10/11/23, SLEEP STUDY, AND DEMOGRAPHIC INFO    Who is requesting this form or medical record from you: [employer]    How would you like to receive the form or medical records (pick-up, mail, fax):  FAX  If fax, what is the fax number: [phone]    Timeframe paperwork needed:  ASAP    Additional notes:  HANNAH FROM [facility] IS REQUESTING THE ABOVE MEDICAL RECORDS IN ORDER TO FILL PATIENT'S BIPAP MACHINE AND SUPPLIES.

## 2023-11-07 NOTE — Clinical Note
using micropuncture technique with ultrasound guidance into the right femoral artery. Sheath insertion not delayed.

## 2023-11-07 NOTE — Clinical Note
Hemostasis started on the right femoral artery. Closure device deployed in the vessel. Hemostasis achieved successfully. Closure device additional comment: Angioseal deployed

## 2023-11-07 NOTE — Clinical Note
Interventional Guidewire removed without incident Perfect serve message sent to Dr. Sarbjit Ley to see if we can advance patient's diet or if he is planning on doing a scope.

## 2023-11-08 PROBLEM — I25.119 CORONARY ARTERY DISEASE INVOLVING NATIVE HEART WITH ANGINA PECTORIS: Status: ACTIVE | Noted: 2023-01-01

## 2023-11-08 PROBLEM — I21.4 NSTEMI (NON-ST ELEVATED MYOCARDIAL INFARCTION): Status: ACTIVE | Noted: 2023-01-01

## 2023-11-08 NOTE — PROGRESS NOTES
Pt seen and examined this AM with MELA Rhoades. I have reviewed Dr. Talavera' H&P and discussed pt's care with him personally. Pt admitted overnight with NSTEMI. Started on heparin and nitro gtt's on admission. Pt reports improvement in his CP but reports some dyspnea. Reports some HA. S/P LHC today revealing multivessel CAD. Pt has been accepted by CT surgery at [facility] and currently awaiting an available bed. Cont to monitor in the PCU. Cardiology input appreciated.     Discussed with Dr. Garcia.     Mian Russell,   11/08/23  16:55 EST

## 2023-11-08 NOTE — PAYOR COMM NOTE
"[facility]  NPI:[id number]    Utilization Review  Contact: Xenia Huerta RN  Phone: [phone]  Fax:[phone]    INITIATE INPATIENT AUTHORIZATION  Frannie Tarango (53 y.o. Male)       Date of Birth   1970    Social Security Number       Address   [address] [place], [place] [address]    Home Phone   [phone]    MRN   [id number]       Latter-day   Non-Mormonism    Marital Status   Single                            Admission Date   11/7/23    Admission Type   Emergency    Admitting Provider   Maury Talavera MD    Attending Provider   Mian Russell DO    Department, Room/Bed   [facility] PROGRESS CARE, P212/S2       Discharge Date       Discharge Disposition       Discharge Destination                                 Attending Provider: Mian Russell DO    Allergies: Meperidine, Ondansetron, Hydrocodone-acetaminophen, Ketorolac, Protonix  [Pantoprazole Sodium], Metoprolol, Morphine, Pantoprazole, Penicillin G, Penicillins, Sulfa Antibiotics    Isolation: None   Infection: None   Code Status: CPR    Ht: 170.2 cm (67.01\")   Wt: 100 kg (221 lb)    Admission Cmt: None   Principal Problem: NSTEMI (non-ST elevated myocardial infarction) [I21.4]                   Active Insurance as of 11/7/2023       Primary Coverage       Payor Plan Insurance Group Employer/Plan Group    ANTHEM BLUE CROSS ANTHEM BLUE CROSS BLUE SHIELD PPO [id number]       Payor Plan Address Payor Plan Phone Number Payor Plan Fax Number Effective Dates    [address] [phone]  5/1/2017 - None Entered    [facility] [id number]         Subscriber Name Subscriber Birth Date Member ID       FRANNIE TARANGO 1970 [id number]               Secondary Coverage       Payor Plan Insurance Group Employer/Plan Group    ANTHEM MEDICAID ANTHEM MEDICAID [id number]       Payor Plan Address Payor Plan Phone Number Payor Plan Fax Number Effective Dates    [address] [address] [phone]  " 2022 - None Entered    [facility] [id number]         Subscriber Name Subscriber Birth Date Member ID       FRANNIE SPAIN 1970 [id number]                     Emergency Contacts        (Rel.) Home Phone Work Phone Mobile Phone    Cathi Spain (Sister) [phone] -- --                 History & Physical        Maury Talavera MD at 23 0345          Hospitalist History and Physical        Patient Identification  Name: Frannie Spain  Age/Sex: 53 y.o. male  :  1970        MRN: [id number]  Visit Number: [id number]  Admit Date: 2023   PCP: Kreis, Samuel Duane, MD          Chief complaint chest pain    History of Present Illness:  Patient is a 53 y.o. male with history of Crohn's disease, lupus, GERD/PUD, HTN, tobacco abuse, primary central and obstructive sleep apnea, and MI in  with heart cath performed at that time that, according to the patient, showed no obstructive CAD. He presents with complaints of acute onset chest pain that woke him from sleep around 2 am on 23. He describes the pain as pressure in sensation, located in the center of his chest with radiation to his left arm and jaw. He reports associated nausea but no dyspnea or diaphoresis. He took some pepto-bismol thinking it was related to his GERD/PUD, but this offered no relief. He has had continuous but waxing and waning chest pressure since onset. Pain is exacerbated by exertion but not really relieved by rest. His only other complaint is ongoing right ankle/foot pain as well as right knee pain; he explains about 2 months ago he fell and tore meniscus in his knee and broke his ankle/foot. He is scheduled for surgical repair of said ankle in the near future. He has some ongoing mild swelling around the right ankle as a result. In the ED, patient was noted to be hypertensive on arrival. Labs revealed significantly elevated troponin with upward trend on repeat. D-dimer was normal. EKG shows  some subtle ST depression in inferior leads II and AVf. CXR commented on mild cardiomegaly and central pulmonary vascular congestion with mild interstitial edema. Patient has been admitted to the PCU for further workup and management.     Review of Systems  Review of Systems   Constitutional:  Positive for activity change and fatigue. Negative for chills, diaphoresis, fever and unexpected weight change.   HENT:  Negative for congestion, sinus pressure, sinus pain, sneezing and sore throat.    Eyes:  Negative for photophobia, pain, discharge, redness, itching and visual disturbance.   Respiratory:  Negative for cough, shortness of breath and wheezing.    Cardiovascular:  Positive for chest pain and leg swelling (right ankle only). Negative for palpitations.   Gastrointestinal:  Positive for nausea. Negative for abdominal distention, abdominal pain, constipation, diarrhea and vomiting.   Endocrine: Negative for cold intolerance, heat intolerance, polydipsia and polyuria.   Genitourinary:  Negative for difficulty urinating, dysuria, flank pain, frequency and hematuria.   Musculoskeletal:  Negative for arthralgias, back pain, joint swelling, myalgias, neck pain and neck stiffness.   Skin:  Negative for color change, pallor, rash and wound.   Neurological:  Negative for dizziness, tremors, seizures, syncope, weakness, light-headedness, numbness and headaches.   Hematological:  Negative for adenopathy. Does not bruise/bleed easily.   Psychiatric/Behavioral:  Negative for agitation, behavioral problems and confusion.        History  Past Medical History:   Diagnosis Date    Crohn's disease     Hypertension 2005    Lupus 2019-20    Myocardial infarction 08/18/2013    Primary central sleep apnea [id number]    Sleep apnea, obstructive 2015-17     Past Surgical History:   Procedure Laterality Date    BACK SURGERY      CARDIAC CATHETERIZATION  2014    CERVICAL SPINE SURGERY  2022    TONSILLECTOMY AND ADENOIDECTOMY       Family  History   Problem Relation Age of Onset    Hypertension Mother     Stroke Mother     Cancer Maternal Grandmother     Diabetes Paternal Grandmother     Asthma Sister     Hypertension Sister     Diabetes Brother     Hypertension Brother      Social History     Tobacco Use    Smoking status: Every Day     Packs/day: 1.00     Years: 40.00     Additional pack years: 0.00     Total pack years: 40.00     Types: Cigarettes     Start date: 5/1/1983     Passive exposure: Current    Smokeless tobacco: Never   Vaping Use    Vaping Use: Never used   Substance Use Topics    Alcohol use: No    Drug use: No     Medications Prior to Admission   Medication Sig Dispense Refill Last Dose    albuterol sulfate  (90 Base) MCG/ACT inhaler Inhale 2 puffs Every 4 (Four) Hours As Needed for Wheezing. 6.7 g 5     aspirin 81 MG tablet Take 1 tablet by mouth Daily. 30 tablet 11     benzonatate (TESSALON) 200 MG capsule Take 1 capsule by mouth 3 (Three) Times a Day As Needed for Cough. 42 capsule 3     budesonide-formoterol (SYMBICORT) 80-4.5 MCG/ACT inhaler Inhale 2 puffs 2 (Two) Times a Day. 10.2 g 5     butalbital-acetaminophen-caffeine (ORBIVAN) -40 MG capsule capsule As Needed.  0     dicyclomine (BENTYL) 20 MG tablet Take 1 tablet by mouth.       hydroCHLOROthiazide (HYDRODIURIL) 25 MG tablet Take 1 tablet by mouth Daily. as directed       lisinopril (PRINIVIL,ZESTRIL) 40 MG tablet Take 1 tablet by mouth Daily. for blood pressure  1     nitroglycerin (NITROSTAT) 0.4 MG SL tablet 1 under the tongue as needed for angina, may repeat q5mins for up three doses 100 tablet 11     omeprazole (priLOSEC) 40 MG capsule TAKE ONE CAPSULE BY MOUTH TWO TIMES PER DAY BEFORE MEALS FOR THE STOMACH       promethazine (PHENERGAN) 25 MG tablet Take 1 tablet by mouth Every 6 (Six) Hours As Needed for Nausea or Vomiting. 90 tablet 1     sertraline (ZOLOFT) 100 MG tablet Take  by mouth Daily.       sucralfate (CARAFATE) 1 g tablet TAKE ONE TABLET BY  MOUTH FOUR TIMES PER DAY AS DIRECTED       tiZANidine (ZANAFLEX) 4 MG tablet TAKE ONE TABLET BY MOUTH THREE TIMES A DAY AS NEEDED FOR MUSCLE SPASMS 90 tablet 1     vedolizumab (Entyvio) 300 MG injection Infuse  into a venous catheter.       vitamin D (ERGOCALCIFEROL) 1.25 MG (69634 UT) capsule capsule Take 1 capsule by mouth 1 (One) Time Per Week. 5 capsule 3      Allergies:  Meperidine, Ondansetron, Hydrocodone-acetaminophen, Ketorolac, Protonix  [pantoprazole sodium], Metoprolol, Morphine, Pantoprazole, Penicillin g, Penicillins, and Sulfa antibiotics    Objective    Vital Signs  Temp:  [97.7 °F (36.5 °C)] 97.7 °F (36.5 °C)  Heart Rate:  [89-97] 93  Resp:  [20] 20  BP: (139-183)/() 163/97  Body mass index is 34.6 kg/m².    Physical Exam:  Physical Exam  Constitutional:       General: He is not in acute distress.     Appearance: Normal appearance. He is ill-appearing.   HENT:      Head: Normocephalic and atraumatic.      Right Ear: External ear normal.      Left Ear: External ear normal.      Nose: Nose normal.      Mouth/Throat:      Mouth: Mucous membranes are moist.      Pharynx: Oropharynx is clear.   Eyes:      Extraocular Movements: Extraocular movements intact.      Conjunctiva/sclera: Conjunctivae normal.      Pupils: Pupils are equal, round, and reactive to light.   Cardiovascular:      Rate and Rhythm: Normal rate and regular rhythm.      Pulses: Normal pulses.      Heart sounds: Normal heart sounds. No murmur heard.  Pulmonary:      Effort: Pulmonary effort is normal. No respiratory distress.      Breath sounds: Normal breath sounds. No wheezing or rales.   Chest:      Chest wall: No tenderness.   Abdominal:      General: Abdomen is flat. Bowel sounds are normal. There is no distension.      Palpations: Abdomen is soft.      Tenderness: There is no abdominal tenderness.   Musculoskeletal:         General: Tenderness (right ankle) present. Normal range of motion.      Cervical back: Normal range of  motion and neck supple. No tenderness.      Right lower leg: Edema (mild, around ankle) present.      Left lower leg: No edema.   Lymphadenopathy:      Cervical: No cervical adenopathy.   Skin:     General: Skin is warm and dry.      Capillary Refill: Capillary refill takes less than 2 seconds.      Coloration: Skin is not jaundiced.      Findings: No bruising or lesion.   Neurological:      General: No focal deficit present.      Mental Status: He is alert and oriented to person, place, and time.   Psychiatric:         Mood and Affect: Mood normal.         Behavior: Behavior normal.         Thought Content: Thought content normal.           Results Review:       Lab Results:  Results from last 7 days   Lab Units 11/07/23  2310   WBC 10*3/mm3 15.28*   HEMOGLOBIN g/dL 18.6*   PLATELETS 10*3/mm3 238         Results from last 7 days   Lab Units 11/07/23  2310   SODIUM mmol/L 139   POTASSIUM mmol/L 3.8   CHLORIDE mmol/L 97*   CO2 mmol/L 28.0   BUN mg/dL 13   CREATININE mg/dL 1.18   CALCIUM mg/dL 9.9   GLUCOSE mg/dL 115*         Hemoglobin A1C   Date Value Ref Range Status   11/07/2023 5.80 (H) 4.80 - 5.60 % Final     Results from last 7 days   Lab Units 11/07/23  2310   BILIRUBIN mg/dL 0.4   ALK PHOS U/L 90   AST (SGOT) U/L 73*   ALT (SGPT) U/L 25     Results from last 7 days   Lab Units 11/08/23  0123 11/07/23  2310   HSTROP T ng/L 454* 429*                   I have reviewed the patient's laboratory results.    Imaging:  Imaging Results (Last 72 Hours)       Procedure Component Value Units Date/Time    XR Chest 1 View [[id number]] Collected: 11/08/23 0117     Updated: 11/08/23 0121    Narrative:      PROCEDURE: Portable chest x-ray examination performed on November 7, 2023. Single view.     HISTORY: Chest pain.     FINDINGS:     Mild enlarged heart size  Hypoinflated lungs.  Central pulmonary vascular congestion.  Mild interstitial edema.  No pleural effusion or pneumothorax.  Fixation plate and screws in the lower  cervical spine.  Mild osteoarthritis at the glenohumeral joints, right greater than left  with mild hypertrophic changes at the AC joints.       Impression:         1.  Mild enlarged heart size.  2.  Central pulmonary vascular congestion with mild interstitial edema.  3.  Hypoinflated lungs.  4.  No pleural effusion.  5.  No pneumothorax.  6.  Fixation plate and screws in the lower cervical spine.     This report was finalized on 11/8/2023 1:19 AM by Tay Watson MD.               I have personally reviewed the patient's radiologic imaging.        EKG:   Sinus rhythm with premature atrial complexes, HR 97, QTc 487  Marked ST abnormality, possible inferior subendocardial injury  Prolonged QT  Abnormal ECG  When compared with ECG of 07-NOV-2023 22:46, (Unconfirmed)  premature atrial complexes are now present    I have personally reviewed the patient's EKG. Subtle ST depression leads II and AVf suggestive of inferior distribution ischemia.         Assessment & Plan    - NSTEMI (non-ST elevated myocardial infarction): admit to PCU. Treat with IV heparin infusion. Received full dose ASA in the ED; continue with daily baby ASA in the morning. Add lipitor. Patient rates pain as 9/10 currently, despite receiving SL NGT and IV morphine. Therefore, will initiate IV nitroglycerin infusion. Obtain ECHO later this morning. Await further recommendations from cardiology.   HTN: BP currently 163/97. IV nitroglycerin should help with BP control. Review home meds once reconciled by pharmacy.  - Pre-diabetes: glucose 115. A1c 5.8. Recommend lifestyle modification.  - Tobacco abuse: counseled on cessation. Patient not interested in quitting at the time. Nicotine patch ordered per patient's request.    DVT Prophylaxis: anticoagulated on IV heparin infusion    Estimated Length of Stay > 2 midnights    I discussed the patient's findings, assessment and plan with the patient, nursing staff in the PCU, and ED provider Dr Pruett.        Maury Talavera MD  11/08/23  03:45 EST      Electronically signed by Maury Talavera MD at 11/08/23 0402          Emergency Department Notes        Tena Brewer, RN at 11/07/23 2356          Per Gregory at pharmacy states that patient can be started on heparin at this time. Gregory states patient will get a 4,000u Bolus with infusin at 10u/kg/hr.     Electronically signed by Tena Brewer RN at 11/07/23 3188       Jewels Pruett MD at 11/07/23 2301          Subjective   History of Present Illness    53-year-old male with past medical history of hypertension, Crohn's disease, lupus, JAKE, prior MI (approx 2013, reports no stents placed) presenting for chest pain, jaw pain and arm pain that started yesterday evening and has been constant.  Reports pain is a sharp epigastric pain.  Reports nausea, denies vomiting.  Has chronic diarrhea related to his Crohn's which is at baseline.  Denies any fevers, shortness of breath.  Also reports headache which started today.  States that he does get headaches regularly and current pain is consistent with prior headaches.     Reports he broke his right ankle several months ago and has had swelling in right lower leg since then which is unchanged.  Denies any history of blood clots.    Review of Systems    Past Medical History:   Diagnosis Date    Crohn's disease     Hypertension 2005    Lupus 2019-20    Myocardial infarction 08/18/2013    Primary central sleep apnea [id number]    Sleep apnea, obstructive 2015-17       Allergies   Allergen Reactions    Meperidine Other (See Comments) and Swelling    Ondansetron Other (See Comments), Hives and Unknown (See Comments)     Ab pain    Hydrocodone-Acetaminophen Other (See Comments)    Ketorolac Itching    Protonix  [Pantoprazole Sodium] Myalgia    Metoprolol Unknown (See Comments) and Other (See Comments)    Morphine Other (See Comments)    Pantoprazole Nausea And Vomiting and Unknown (See Comments)    Penicillin G  Rash    Penicillins Rash    Sulfa Antibiotics Rash       Past Surgical History:   Procedure Laterality Date    BACK SURGERY      CARDIAC CATHETERIZATION  2014    CERVICAL SPINE SURGERY  2022    TONSILLECTOMY AND ADENOIDECTOMY         Family History   Problem Relation Age of Onset    Hypertension Mother     Stroke Mother     Cancer Maternal Grandmother     Diabetes Paternal Grandmother     Asthma Sister     Hypertension Sister     Diabetes Brother     Hypertension Brother        Social History     Socioeconomic History    Marital status: Single   Tobacco Use    Smoking status: Every Day     Packs/day: 1.00     Years: 40.00     Additional pack years: 0.00     Total pack years: 40.00     Types: Cigarettes     Start date: 5/1/1983     Passive exposure: Current    Smokeless tobacco: Never   Vaping Use    Vaping Use: Never used   Substance and Sexual Activity    Alcohol use: No    Drug use: No           Objective   Physical Exam  Vitals and nursing note reviewed.   Constitutional:       General: He is not in acute distress.  HENT:      Head: Normocephalic.      Mouth/Throat:      Mouth: Mucous membranes are moist.   Cardiovascular:      Rate and Rhythm: Normal rate and regular rhythm.      Pulses: Normal pulses.   Pulmonary:      Effort: Pulmonary effort is normal. No respiratory distress.      Breath sounds: Normal breath sounds.   Chest:      Chest wall: No tenderness.   Abdominal:      General: Abdomen is flat. There is no distension.      Palpations: Abdomen is soft.      Tenderness: There is abdominal tenderness (mild epigastric).   Musculoskeletal:         General: Normal range of motion.      Cervical back: Normal range of motion and neck supple.   Skin:     General: Skin is warm and dry.      Capillary Refill: Capillary refill takes less than 2 seconds.   Neurological:      Mental Status: He is alert.         Procedures          ED Course  ED Course as of 11/08/23 0454   Tue Nov 07, 2023   2308   EKG ED  Interpretation by: Jewels Pruett MD on 11/07/23 at 22:46  EKG: HR 93, unchanged from previous tracings, normal sinus rhythm.     Electronically signed by Jewels Pruett MD, 11/07/23, 11:08 PM EST.   [KH]   Wed Nov 08, 2023   0245 Spoke with Dr. Garcia, admit for NSTEMI, heparin gtt, nitro [KH]   0321   EKG ED Interpretation by: Jewels Pruett MD on 11/08/23 at 02:22  EKG: HR 97, normal sinus rhythm, frequent PVC's noted, new ST depressions in inferior leads.     Electronically signed by Jewels Pruett MD, 11/08/23, 3:21 AM EST.   [KH]      ED Course User Index  [KH] Jewels Pruett MD                                           Medical Decision Making  Amount and/or Complexity of Data Reviewed  Labs: ordered.  Radiology: ordered.  ECG/medicine tests: ordered.    Risk  OTC drugs.  Prescription drug management.  Decision regarding hospitalization.      53-year-old male with reported history of MI in 2013 presenting with chest pain radiating to jaw and arm found to have NSTEMI. Ddimer negative, low suspicion for PE. Placed on heparin drip.  Admitted to hospitalist service with cardiology consult.         Final diagnoses:   NSTEMI (non-ST elevated myocardial infarction)       ED Disposition  ED Disposition       ED Disposition   Decision to Admit    Condition   --    Comment   Level of Care: Progressive Care [20]   Diagnosis: NSTEMI (non-ST elevated myocardial infarction) [465275]   Admitting Physician: AMBAR VELARDE [[id number]]   Attending Physician: AMBAR VELARDE [[id number]]   Certification: I Certify That Inpatient Hospital Services Are Medically Necessary For Greater Than 2 Midnights                 No follow-up provider specified.       Medication List      No changes were made to your prescriptions during this visit.            Jewels Pruett MD  11/08/23 0457      Electronically signed by Jewels Pruett MD at 11/08/23 0457       Facility-Administered Medications as of 11/8/2023   Medication Dose Route Frequency  Provider Last Rate Last Admin    [COMPLETED] acetaminophen (TYLENOL) tablet 325 mg  325 mg Oral Once Maury Talavera MD   325 mg at 11/08/23 0652    albuterol (PROVENTIL) nebulizer solution 0.083% 2.5 mg/3mL  2.5 mg Nebulization Q4H PRN Maury Talavera MD        [COMPLETED] aspirin chewable tablet 324 mg  324 mg Oral Once Jewels Pruett MD   324 mg at 11/08/23 0253    aspirin chewable tablet 81 mg  81 mg Oral Daily Maury Talavera MD   81 mg at 11/08/23 0820    atorvastatin (LIPITOR) tablet 40 mg  40 mg Oral Nightly Maury Talavera MD   40 mg at 11/08/23 0317    benzonatate (TESSALON) capsule 200 mg  200 mg Oral TID PRN Maury Talavera MD        sennosides-docusate (PERICOLACE) 8.6-50 MG per tablet 2 tablet  2 tablet Oral BID Maury Talavera MD        And    polyethylene glycol (MIRALAX) packet 17 g  17 g Oral Daily PRN Maury Talavera MD        And    bisacodyl (DULCOLAX) EC tablet 5 mg  5 mg Oral Daily PRN Maury Talavera MD        And    bisacodyl (DULCOLAX) suppository 10 mg  10 mg Rectal Daily PRN Maury Talavera MD        budesonide-formoterol (SYMBICORT) 160-4.5 MCG/ACT inhaler 2 puff  2 puff Inhalation BID - RT Maury Talavera MD        Calcium Replacement - Follow Nurse / BPA Driven Protocol   Does not apply PRN Mian Russell DO        [START ON 11/10/2023] cholecalciferol (VITAMIN D3) capsule 50,000 Units  50,000 Units Oral Weekly Maury Talavera MD        famotidine (PEPCID) tablet 20 mg  20 mg Oral BID AC Maury Talavera MD   20 mg at 11/08/23 0820    heparin (porcine) 5000 UNIT/ML injection 2,000 Units  2,000 Units Intravenous PRN Maury Talavera MD        [COMPLETED] heparin (porcine) 5000 UNIT/ML injection 4,000 Units  4,000 Units Intravenous Once Jewels Pruett MD   4,000 Units at 11/08/23 0013    heparin (porcine) 5000 UNIT/ML injection 4,000 Units  4,000 Units Intravenous PRN Maury Talavera,  MD        heparin 90033 units/250 mL (100 units/mL) in 0.45 % NaCl infusion  10 Units/kg/hr Intravenous Titrated Maury Talavera MD 10 mL/hr at 11/08/23 0013 10 Units/kg/hr at 11/08/23 0013    hydroCHLOROthiazide (HYDRODIURIL) tablet 25 mg  25 mg Oral Daily Maury Talavera MD   25 mg at 11/08/23 0821    lisinopril (PRINIVIL,ZESTRIL) tablet 40 mg  40 mg Oral Daily Maury Talavera MD   40 mg at 11/08/23 0821    Magnesium Standard Dose Replacement - Follow Nurse / BPA Driven Protocol   Does not apply PRN Mian Russell DO        [COMPLETED] morphine injection 4 mg  4 mg Intravenous Once Jewels Pruett MD   4 mg at 11/08/23 0253    nicotine (NICODERM CQ) 14 MG/24HR patch 1 patch  1 patch Transdermal Q24H Maury Talavera MD   1 patch at 11/08/23 0631    nitroglycerin (NITROSTAT) SL tablet 0.4 mg  0.4 mg Sublingual Q5 Min PRN Maury Talavera MD   0.4 mg at 11/08/23 0249    nitroglycerin (TRIDIL) 200 mcg/ml infusion  5-200 mcg/min Intravenous Titrated Maury Talavera MD 4.5 mL/hr at 11/08/23 0744 15 mcg/min at 11/08/23 0744    Pharmacy to Dose Heparin   Does not apply Continuous PRN Maury Talavera MD        Phosphorus Replacement - Follow Nurse / BPA Driven Protocol   Does not apply PRN Mian Russell DO        potassium chloride (K-DUR,KLOR-CON) CR tablet 40 mEq  40 mEq Oral Q4H Mian Russell DO   40 mEq at 11/08/23 0820    Potassium Replacement - Follow Nurse / BPA Driven Protocol   Does not apply PRN Mian Russell DO        [COMPLETED] prochlorperazine (COMPAZINE) injection 5 mg  5 mg Intravenous Once Jewels Pruett MD   5 mg at 11/08/23 0249    sertraline (ZOLOFT) tablet 150 mg  150 mg Oral Daily Maury Talavera MD   150 mg at 11/08/23 0821    sodium chloride 0.9 % flush 10 mL  10 mL Intravenous Q12H Maury Talavera MD   10 mL at 11/08/23 0821    sodium chloride 0.9 % flush 10 mL  10 mL Intravenous PRN Maury Talavera  MD Arnoldo        sodium chloride 0.9 % infusion 40 mL  40 mL Intravenous PRN Maury Talavera MD        sucralfate (CARAFATE) tablet 1 g  1 g Oral 4x Daily Maury Talavera MD   1 g at 11/08/23 0821    tiZANidine (ZANAFLEX) tablet 4 mg  4 mg Oral TID PRN Maury Talavera MD

## 2023-11-08 NOTE — PLAN OF CARE
Goal Outcome Evaluation:  Plan of Care Reviewed With: patient, family        Progress: no change  Outcome Evaluation: Pt continues with ambulation at [facility]. Pt educated on the risks of ambulation. Pt has all items in reach, will continue to monitor and complete site checks.

## 2023-11-08 NOTE — H&P
Hospitalist History and Physical        Patient Identification  Name: Ronaldo Spain  Age/Sex: 53 y.o. male  :  1970        MRN: [id number]  Visit Number: [id number]  Admit Date: 2023   PCP: Kreis, Samuel Duane, MD          Chief complaint chest pain    History of Present Illness:  Patient is a 53 y.o. male with history of Crohn's disease, lupus, GERD/PUD, HTN, tobacco abuse, primary central and obstructive sleep apnea, and MI in  with heart cath performed at that time that, according to the patient, showed no obstructive CAD. He presents with complaints of acute onset chest pain that woke him from sleep around 2 am on 23. He describes the pain as pressure in sensation, located in the center of his chest with radiation to his left arm and jaw. He reports associated nausea but no dyspnea or diaphoresis. He took some pepto-bismol thinking it was related to his GERD/PUD, but this offered no relief. He has had continuous but waxing and waning chest pressure since onset. Pain is exacerbated by exertion but not really relieved by rest. His only other complaint is ongoing right ankle/foot pain as well as right knee pain; he explains about 2 months ago he fell and tore meniscus in his knee and broke his ankle/foot. He is scheduled for surgical repair of said ankle in the near future. He has some ongoing mild swelling around the right ankle as a result. In the ED, patient was noted to be hypertensive on arrival. Labs revealed significantly elevated troponin with upward trend on repeat. D-dimer was normal. EKG shows some subtle ST depression in inferior leads II and AVf. CXR commented on mild cardiomegaly and central pulmonary vascular congestion with mild interstitial edema. Patient has been admitted to the PCU for further workup and management.     Review of Systems  Review of Systems   Constitutional:  Positive for activity change and fatigue. Negative for chills, diaphoresis, fever and unexpected  weight change.   HENT:  Negative for congestion, sinus pressure, sinus pain, sneezing and sore throat.    Eyes:  Negative for photophobia, pain, discharge, redness, itching and visual disturbance.   Respiratory:  Negative for cough, shortness of breath and wheezing.    Cardiovascular:  Positive for chest pain and leg swelling (right ankle only). Negative for palpitations.   Gastrointestinal:  Positive for nausea. Negative for abdominal distention, abdominal pain, constipation, diarrhea and vomiting.   Endocrine: Negative for cold intolerance, heat intolerance, polydipsia and polyuria.   Genitourinary:  Negative for difficulty urinating, dysuria, flank pain, frequency and hematuria.   Musculoskeletal:  Negative for arthralgias, back pain, joint swelling, myalgias, neck pain and neck stiffness.   Skin:  Negative for color change, pallor, rash and wound.   Neurological:  Negative for dizziness, tremors, seizures, syncope, weakness, light-headedness, numbness and headaches.   Hematological:  Negative for adenopathy. Does not bruise/bleed easily.   Psychiatric/Behavioral:  Negative for agitation, behavioral problems and confusion.        History  Past Medical History:   Diagnosis Date    Crohn's disease     Hypertension 2005    Lupus 2019-20    Myocardial infarction 08/18/2013    Primary central sleep apnea 2316-1600    Sleep apnea, obstructive 2015-17     Past Surgical History:   Procedure Laterality Date    BACK SURGERY      CARDIAC CATHETERIZATION  2014    CERVICAL SPINE SURGERY  2022    TONSILLECTOMY AND ADENOIDECTOMY       Family History   Problem Relation Age of Onset    Hypertension Mother     Stroke Mother     Cancer Maternal Grandmother     Diabetes Paternal Grandmother     Asthma Sister     Hypertension Sister     Diabetes Brother     Hypertension Brother      Social History     Tobacco Use    Smoking status: Every Day     Packs/day: 1.00     Years: 40.00     Additional pack years: 0.00     Total pack years:  40.00     Types: Cigarettes     Start date: 5/1/1983     Passive exposure: Current    Smokeless tobacco: Never   Vaping Use    Vaping Use: Never used   Substance Use Topics    Alcohol use: No    Drug use: No     Medications Prior to Admission   Medication Sig Dispense Refill Last Dose    albuterol sulfate  (90 Base) MCG/ACT inhaler Inhale 2 puffs Every 4 (Four) Hours As Needed for Wheezing. 6.7 g 5     aspirin 81 MG tablet Take 1 tablet by mouth Daily. 30 tablet 11     benzonatate (TESSALON) 200 MG capsule Take 1 capsule by mouth 3 (Three) Times a Day As Needed for Cough. 42 capsule 3     budesonide-formoterol (SYMBICORT) 80-4.5 MCG/ACT inhaler Inhale 2 puffs 2 (Two) Times a Day. 10.2 g 5     butalbital-acetaminophen-caffeine (ORBIVAN) -40 MG capsule capsule As Needed.  0     dicyclomine (BENTYL) 20 MG tablet Take 1 tablet by mouth.       hydroCHLOROthiazide (HYDRODIURIL) 25 MG tablet Take 1 tablet by mouth Daily. as directed       lisinopril (PRINIVIL,ZESTRIL) 40 MG tablet Take 1 tablet by mouth Daily. for blood pressure  1     nitroglycerin (NITROSTAT) 0.4 MG SL tablet 1 under the tongue as needed for angina, may repeat q5mins for up three doses 100 tablet 11     omeprazole (priLOSEC) 40 MG capsule TAKE ONE CAPSULE BY MOUTH TWO TIMES PER DAY BEFORE MEALS FOR THE STOMACH       promethazine (PHENERGAN) 25 MG tablet Take 1 tablet by mouth Every 6 (Six) Hours As Needed for Nausea or Vomiting. 90 tablet 1     sertraline (ZOLOFT) 100 MG tablet Take  by mouth Daily.       sucralfate (CARAFATE) 1 g tablet TAKE ONE TABLET BY MOUTH FOUR TIMES PER DAY AS DIRECTED       tiZANidine (ZANAFLEX) 4 MG tablet TAKE ONE TABLET BY MOUTH THREE TIMES A DAY AS NEEDED FOR MUSCLE SPASMS 90 tablet 1     vedolizumab (Entyvio) 300 MG injection Infuse  into a venous catheter.       vitamin D (ERGOCALCIFEROL) 1.25 MG (11786 UT) capsule capsule Take 1 capsule by mouth 1 (One) Time Per Week. 5 capsule 3      Allergies:  Meperidine,  Ondansetron, Hydrocodone-acetaminophen, Ketorolac, Protonix  [pantoprazole sodium], Metoprolol, Morphine, Pantoprazole, Penicillin g, Penicillins, and Sulfa antibiotics    Objective     Vital Signs  Temp:  [97.7 °F (36.5 °C)] 97.7 °F (36.5 °C)  Heart Rate:  [89-97] 93  Resp:  [20] 20  BP: (139-183)/() 163/97  Body mass index is 34.6 kg/m².    Physical Exam:  Physical Exam  Constitutional:       General: He is not in acute distress.     Appearance: Normal appearance. He is ill-appearing.   HENT:      Head: Normocephalic and atraumatic.      Right Ear: External ear normal.      Left Ear: External ear normal.      Nose: Nose normal.      Mouth/Throat:      Mouth: Mucous membranes are moist.      Pharynx: Oropharynx is clear.   Eyes:      Extraocular Movements: Extraocular movements intact.      Conjunctiva/sclera: Conjunctivae normal.      Pupils: Pupils are equal, round, and reactive to light.   Cardiovascular:      Rate and Rhythm: Normal rate and regular rhythm.      Pulses: Normal pulses.      Heart sounds: Normal heart sounds. No murmur heard.  Pulmonary:      Effort: Pulmonary effort is normal. No respiratory distress.      Breath sounds: Normal breath sounds. No wheezing or rales.   Chest:      Chest wall: No tenderness.   Abdominal:      General: Abdomen is flat. Bowel sounds are normal. There is no distension.      Palpations: Abdomen is soft.      Tenderness: There is no abdominal tenderness.   Musculoskeletal:         General: Tenderness (right ankle) present. Normal range of motion.      Cervical back: Normal range of motion and neck supple. No tenderness.      Right lower leg: Edema (mild, around ankle) present.      Left lower leg: No edema.   Lymphadenopathy:      Cervical: No cervical adenopathy.   Skin:     General: Skin is warm and dry.      Capillary Refill: Capillary refill takes less than 2 seconds.      Coloration: Skin is not jaundiced.      Findings: No bruising or lesion.   Neurological:       General: No focal deficit present.      Mental Status: He is alert and oriented to person, place, and time.   Psychiatric:         Mood and Affect: Mood normal.         Behavior: Behavior normal.         Thought Content: Thought content normal.           Results Review:       Lab Results:  Results from last 7 days   Lab Units 11/07/23  2310   WBC 10*3/mm3 15.28*   HEMOGLOBIN g/dL 18.6*   PLATELETS 10*3/mm3 238         Results from last 7 days   Lab Units 11/07/23  2310   SODIUM mmol/L 139   POTASSIUM mmol/L 3.8   CHLORIDE mmol/L 97*   CO2 mmol/L 28.0   BUN mg/dL 13   CREATININE mg/dL 1.18   CALCIUM mg/dL 9.9   GLUCOSE mg/dL 115*         Hemoglobin A1C   Date Value Ref Range Status   11/07/2023 5.80 (H) 4.80 - 5.60 % Final     Results from last 7 days   Lab Units 11/07/23  2310   BILIRUBIN mg/dL 0.4   ALK PHOS U/L 90   AST (SGOT) U/L 73*   ALT (SGPT) U/L 25     Results from last 7 days   Lab Units 11/08/23 0123 11/07/23  2310   HSTROP T ng/L 454* 429*                   I have reviewed the patient's laboratory results.    Imaging:  Imaging Results (Last 72 Hours)       Procedure Component Value Units Date/Time    XR Chest 1 View [[id number]] Collected: 11/08/23 0117     Updated: 11/08/23 0121    Narrative:      PROCEDURE: Portable chest x-ray examination performed on November 7, 2023. Single view.     HISTORY: Chest pain.     FINDINGS:     Mild enlarged heart size  Hypoinflated lungs.  Central pulmonary vascular congestion.  Mild interstitial edema.  No pleural effusion or pneumothorax.  Fixation plate and screws in the lower cervical spine.  Mild osteoarthritis at the glenohumeral joints, right greater than left  with mild hypertrophic changes at the AC joints.       Impression:         1.  Mild enlarged heart size.  2.  Central pulmonary vascular congestion with mild interstitial edema.  3.  Hypoinflated lungs.  4.  No pleural effusion.  5.  No pneumothorax.  6.  Fixation plate and screws in the lower cervical  spine.     This report was finalized on 11/8/2023 1:19 AM by Tay Watson MD.               I have personally reviewed the patient's radiologic imaging.        EKG:   Sinus rhythm with premature atrial complexes, HR 97, QTc 487  Marked ST abnormality, possible inferior subendocardial injury  Prolonged QT  Abnormal ECG  When compared with ECG of 07-NOV-2023 22:46, (Unconfirmed)  premature atrial complexes are now present    I have personally reviewed the patient's EKG. Subtle ST depression leads II and AVf suggestive of inferior distribution ischemia.         Assessment & Plan     - NSTEMI (non-ST elevated myocardial infarction): admit to PCU. Treat with IV heparin infusion. Received full dose ASA in the ED; continue with daily baby ASA in the morning. Add lipitor. Patient rates pain as 9/10 currently, despite receiving SL NGT and IV morphine. Therefore, will initiate IV nitroglycerin infusion. Obtain ECHO later this morning. Await further recommendations from cardiology.   HTN: BP currently 163/97. IV nitroglycerin should help with BP control. Review home meds once reconciled by pharmacy.  - Pre-diabetes: glucose 115. A1c 5.8. Recommend lifestyle modification.  - Tobacco abuse: counseled on cessation. Patient not interested in quitting at the time. Nicotine patch ordered per patient's request.    DVT Prophylaxis: anticoagulated on IV heparin infusion    Estimated Length of Stay > 2 midnights    I discussed the patient's findings, assessment and plan with the patient, nursing staff in the PCU, and ED provider Dr Pruett.       Maury Talavera MD  11/08/23  03:45 EST

## 2023-11-08 NOTE — PROGRESS NOTES
[facility]  Ronaldo Spain is a  53 y.o. male receiving heparin infusion.     Therapy for (VTE/Cardiac):   Cardiac  Patient Dosing Weight: 100 kg   Initial Bolus (Y/N):   Y  Any Bolus (Y/N):   Y        Signs or Symptoms of Bleeding: No     Cardiac or Other (Not VTE)   Initial Bolus: 60 units/kg (Max 4,000 units)  Initial rate: 12 units/kg/hr (Max 1,000 units/hr)   Anti Xa Rebolus Infusion Hold time Change infusion Dose (Units/kg/hr) Next Anti Xa or aPTT Level Due   < 0.11 50 Units/kg  (4000 Units Max) None Increase by  3 Units/kg/hr 6 hours   0.11- 0.19 25 Units/kg  (2000 Units Max) None Increase by  2 Units/kg/hr 6 hours   0.2 - 0.29 0 None Increase by  1 Units/kg/hr 6 hours   0.3 - 0.5 0 None No Change 6 hours (after 2 consecutive levels in range check q24h @0700)   0.51 - 0.6 0 None Decrease by  1 Units/kg/hr 6 hours   0.61 - 0.8 0 30 Minutes Decrease by  2 Units/kg/hr 6 hours   0.81 - 1 0 60 Minutes Decrease by  3 Units/kg/hr 6 hours   >1 0 Hold  After Anti Xa less than 0.5 decrease previous rate by  4 Units/kg/hr  Every 2 hours until Anti Xa  less than 0.5 then when infusion restarts in 6 hours         Recommend anti-Xa every 6 hours.          Date   Time   Anti-Xa Current Rate (Unit/kg/hr) Bolus   (Units) Rate Change   (Unit/kg/hr) New Rate (Unit/kg/hr) Next   Anti-Xa Comments  Pump Check Daily   11/07 2350 pending - 4000 - 10 0700 New start w/ bolus, no s/s bleeding per MELA Madsen     11/8 0819 <0.1 10 4000 +3 13 1600 Spoke with nurse [place] about bolus and rate increase. No s/sx bleeding                                                                                                                                                                                                                      Pharmacy will continue to follow anti-Xa results and monitor for signs and symptoms of bleeding or thrombosis.    Thank you,    Kaylah Miller, PharmD  11/8/2023  09:06 EST

## 2023-11-08 NOTE — PLAN OF CARE
Goal Outcome Evaluation:          Problem: Hypertension Comorbidity  Goal: Blood Pressure in Desired Range  Outcome: Ongoing, Progressing  Intervention: Maintain Blood Pressure Management  Recent Flowsheet Documentation  Taken 11/8/2023 0500 by Concha Sierra RN  Medication Review/Management: medications reviewed  Taken 11/8/2023 0335 by Concha Sierra RN  Medication Review/Management: medications reviewed  Goal: Blood Pressure in Desired Range  Outcome: Ongoing, Progressing  Intervention: Maintain Blood Pressure Management  Recent Flowsheet Documentation  Taken 11/8/2023 0500 by Concha Sierra RN  Medication Review/Management: medications reviewed  Taken 11/8/2023 0335 by Concha Sierra RN  Medication Review/Management: medications reviewed     Problem: Pain Acute  Goal: Acceptable Pain Control and Functional Ability  Outcome: Ongoing, Progressing  Intervention: Prevent or Manage Pain  Recent Flowsheet Documentation  Taken 11/8/2023 0500 by Concha Sierra RN  Medication Review/Management: medications reviewed  Taken 11/8/2023 0335 by Concha Sierra RN  Medication Review/Management: medications reviewed  Intervention: Optimize Psychosocial Wellbeing  Recent Flowsheet Documentation  Taken 11/8/2023 0335 by Concha Sierra RN  Diversional Activities:   television   smartphone     Problem: Chest Pain  Goal: Resolution of Chest Pain Symptoms  Outcome: Ongoing, Progressing  Intervention: Manage Acute Chest Pain  Recent Flowsheet Documentation  Taken 11/8/2023 0435 by Concha Sierra RN  Chest Pain Intervention: cardiac monitoring continued  Taken 11/8/2023 0351 by Concha Sierra RN  Chest Pain Intervention:   cardiac monitoring continued   nitroglycerin drip  Taken 11/8/2023 0335 by Concha Sierra RN  Chest Pain Intervention:   cardiac biomarkers drawn   cardiac monitoring continued   cardiac monitor placed   activity minimized     Problem: Asthma Comorbidity  Goal: Maintenance of Asthma Control  Outcome:  Ongoing, Progressing  Intervention: Maintain Asthma Symptom Control  Recent Flowsheet Documentation  Taken 11/8/2023 0500 by Concha Sierra RN  Medication Review/Management: medications reviewed  Taken 11/8/2023 0335 by Concha Sierra RN  Medication Review/Management: medications reviewed     Problem: Behavioral Health Comorbidity  Goal: Maintenance of Behavioral Health Symptom Control  Outcome: Ongoing, Progressing  Intervention: Maintain Behavioral Health Symptom Control  Recent Flowsheet Documentation  Taken 11/8/2023 0500 by Concha Sierra RN  Medication Review/Management: medications reviewed  Taken 11/8/2023 0335 by Concha Sierra RN  Medication Review/Management: medications reviewed     Problem: COPD (Chronic Obstructive Pulmonary Disease) Comorbidity  Goal: Maintenance of COPD Symptom Control  Outcome: Ongoing, Progressing  Intervention: Maintain COPD-Symptom Control  Recent Flowsheet Documentation  Taken 11/8/2023 0500 by Concha Sierra RN  Medication Review/Management: medications reviewed  Taken 11/8/2023 0335 by Concha Sierra RN  Medication Review/Management: medications reviewed     Problem: Diabetes Comorbidity  Goal: Blood Glucose Level Within Targeted Range  Outcome: Ongoing, Progressing     Problem: Heart Failure Comorbidity  Goal: Maintenance of Heart Failure Symptom Control  Outcome: Ongoing, Progressing  Intervention: Maintain Heart Failure-Management  Recent Flowsheet Documentation  Taken 11/8/2023 0500 by Concha Sierra RN  Medication Review/Management: medications reviewed  Taken 11/8/2023 0335 by Concha Sierra RN  Medication Review/Management: medications reviewed     Problem: Obstructive Sleep Apnea Risk or Actual Comorbidity Management  Goal: Unobstructed Breathing During Sleep  Outcome: Ongoing, Progressing     Problem: Osteoarthritis Comorbidity  Goal: Maintenance of Osteoarthritis Symptom Control  Outcome: Ongoing, Progressing  Intervention: Maintain Osteoarthritis Symptom  Control  Recent Flowsheet Documentation  Taken 11/8/2023 0500 by Concha Sierra, RN  Medication Review/Management: medications reviewed  Taken 11/8/2023 0335 by Concha Sierra RN  Medication Review/Management: medications reviewed     Problem: Pain Chronic (Persistent) (Comorbidity Management)  Goal: Acceptable Pain Control and Functional Ability  Outcome: Ongoing, Progressing  Intervention: Manage Persistent Pain  Recent Flowsheet Documentation  Taken 11/8/2023 0500 by Concha Sierra, RN  Medication Review/Management: medications reviewed  Taken 11/8/2023 0335 by Concha Sierra, RN  Medication Review/Management: medications reviewed  Intervention: Optimize Psychosocial Wellbeing  Recent Flowsheet Documentation  Taken 11/8/2023 0335 by Concha Sierra, RN  Diversional Activities:   television   smartphone  Family/Support System Care: support provided     Problem: Seizure Disorder Comorbidity  Goal: Maintenance of Seizure Control  Outcome: Ongoing, Progressing

## 2023-11-08 NOTE — ED NOTES
Per Gregory at pharmacy states that patient can be started on heparin at this time. Gregory states patient will get a 4,000u Bolus with infusin at 10u/kg/hr.

## 2023-11-08 NOTE — NURSING NOTE
Pt educated about the dangers of ambulation after heart cath. Pt verbalizes understanding and continues to ambulate to the bathroom and around the room. Pt A&Ox4.

## 2023-11-08 NOTE — ED PROVIDER NOTES
Subjective   History of Present Illness    53-year-old male with past medical history of hypertension, Crohn's disease, lupus, JAKE, prior MI (approx 2013, reports no stents placed) presenting for chest pain, jaw pain and arm pain that started yesterday evening and has been constant.  Reports pain is a sharp epigastric pain.  Reports nausea, denies vomiting.  Has chronic diarrhea related to his Crohn's which is at baseline.  Denies any fevers, shortness of breath.  Also reports headache which started today.  States that he does get headaches regularly and current pain is consistent with prior headaches.     Reports he broke his right ankle several months ago and has had swelling in right lower leg since then which is unchanged.  Denies any history of blood clots.    Review of Systems    Past Medical History:   Diagnosis Date    Crohn's disease     Hypertension 2005    Lupus 2019-20    Myocardial infarction 08/18/2013    Primary central sleep apnea [id number]    Sleep apnea, obstructive 2015-17       Allergies   Allergen Reactions    Meperidine Other (See Comments) and Swelling    Ondansetron Other (See Comments), Hives and Unknown (See Comments)     Ab pain    Hydrocodone-Acetaminophen Other (See Comments)    Ketorolac Itching    Protonix  [Pantoprazole Sodium] Myalgia    Metoprolol Unknown (See Comments) and Other (See Comments)    Morphine Other (See Comments)    Pantoprazole Nausea And Vomiting and Unknown (See Comments)    Penicillin G Rash    Penicillins Rash    Sulfa Antibiotics Rash       Past Surgical History:   Procedure Laterality Date    BACK SURGERY      CARDIAC CATHETERIZATION  2014    CERVICAL SPINE SURGERY  2022    TONSILLECTOMY AND ADENOIDECTOMY         Family History   Problem Relation Age of Onset    Hypertension Mother     Stroke Mother     Cancer Maternal Grandmother     Diabetes Paternal Grandmother     Asthma Sister     Hypertension Sister     Diabetes Brother     Hypertension Brother         Social History     Socioeconomic History    Marital status: Single   Tobacco Use    Smoking status: Every Day     Packs/day: 1.00     Years: 40.00     Additional pack years: 0.00     Total pack years: 40.00     Types: Cigarettes     Start date: 5/1/1983     Passive exposure: Current    Smokeless tobacco: Never   Vaping Use    Vaping Use: Never used   Substance and Sexual Activity    Alcohol use: No    Drug use: No           Objective   Physical Exam  Vitals and nursing note reviewed.   Constitutional:       General: He is not in acute distress.  HENT:      Head: Normocephalic.      Mouth/Throat:      Mouth: Mucous membranes are moist.   Cardiovascular:      Rate and Rhythm: Normal rate and regular rhythm.      Pulses: Normal pulses.   Pulmonary:      Effort: Pulmonary effort is normal. No respiratory distress.      Breath sounds: Normal breath sounds.   Chest:      Chest wall: No tenderness.   Abdominal:      General: Abdomen is flat. There is no distension.      Palpations: Abdomen is soft.      Tenderness: There is abdominal tenderness (mild epigastric).   Musculoskeletal:         General: Normal range of motion.      Cervical back: Normal range of motion and neck supple.   Skin:     General: Skin is warm and dry.      Capillary Refill: Capillary refill takes less than 2 seconds.   Neurological:      Mental Status: He is alert.         Procedures           ED Course  ED Course as of 11/08/23 0454   Tue Nov 07, 2023   2308   EKG ED Interpretation by: Jewels Pruett MD on 11/07/23 at 22:46  EKG: HR 93, unchanged from previous tracings, normal sinus rhythm.     Electronically signed by Jewels Pruett MD, 11/07/23, 11:08 PM EST.   []   Wed Nov 08, 2023   0245 Spoke with Dr. Garcia, admit for NSTEMI, heparin gtt, nitro [KH]   0321   EKG ED Interpretation by: Jewels Pruett MD on 11/08/23 at 02:22  EKG: HR 97, normal sinus rhythm, frequent PVC's noted, new ST depressions in inferior leads.     Electronically signed  by Jewels Pruett MD, 11/08/23, 3:21 AM EST.   [KH]      ED Course User Index  [KH] Jewels Pruett MD                                           Medical Decision Making  Amount and/or Complexity of Data Reviewed  Labs: ordered.  Radiology: ordered.  ECG/medicine tests: ordered.    Risk  OTC drugs.  Prescription drug management.  Decision regarding hospitalization.      53-year-old male with reported history of MI in 2013 presenting with chest pain radiating to jaw and arm found to have NSTEMI. Ddimer negative, low suspicion for PE. Placed on heparin drip.  Admitted to hospitalist service with cardiology consult.         Final diagnoses:   NSTEMI (non-ST elevated myocardial infarction)       ED Disposition  ED Disposition       ED Disposition   Decision to Admit    Condition   --    Comment   Level of Care: Progressive Care [20]   Diagnosis: NSTEMI (non-ST elevated myocardial infarction) [[id number]]   Admitting Physician: AMBAR VELARDE [[id number]]   Attending Physician: AMBAR VELARDE [[id number]]   Certification: I Certify That Inpatient Hospital Services Are Medically Necessary For Greater Than 2 Midnights                 No follow-up provider specified.       Medication List      No changes were made to your prescriptions during this visit.            Jewels Pruett MD  11/08/23 0457

## 2023-11-08 NOTE — NURSING NOTE
Order received for Cardiac Rehab Consultation.     CR staff will follow up with patient      Information discussed with: Patient        Educated on: Benefits of Exercise,  Educated on Cardiac Rehab and Program Protocol, Brochure and/or educational material provided, Contact information given, and Teach Back Verified        Comments: Patient being transferred to outside facility for higher level of care and possible intervention. Educated patient about cardiac rehab and explained that we will possibly get another referral If further intervention is needed. Staff explain to pt that if he/she hasn't heard from us in 6 weeks, contact us about scheduling if he is interested in the program. Pt aware that he can call cardiac rehab anytime if he loses contact information or has questions/concerns about the program.       Thank you for the referral. Please contact the Cardiac Rehab Dept. (ext. [phone]) with any further questions or concerns.

## 2023-11-08 NOTE — CONSULTS
Consults  Date of Admit: 11/7/2023  Date of Consult: 11/08/23  Provider, No Known  Ronaldo Spain  1970    Consulting Physician: Stacia Garcia MD    Cardiology consultation    Reason for consultation: NSTEMI      Chest Pain   Associated symptoms include abdominal pain, headaches and nausea. Pertinent negatives include no diaphoresis, vomiting or weakness.   Abdominal Pain  Associated symptoms include headaches and nausea. Pertinent negatives include no vomiting.   Arm Pain   Associated symptoms include chest pain.   Jaw Pain  Associated symptoms include abdominal pain, chest pain, headaches and nausea. Pertinent negatives include no diaphoresis, vomiting or weakness.   Headache      Subjective     Chief Complaint   Patient presents with    Chest Pain    Abdominal Pain    Arm Pain    Jaw Pain    Headache       Ronaldo Spain is a 53 y.o. male with past medical history significant for reported MI, hypertension, hyperlipidemia, Crohn's disease, lupus, GERD/PUD, primary central and obstructive sleep apnea, obesity and smoking.    He was awakened from sleep 11/07/2023 due to pressure in the center of his chest that radiated into his left arm and jaw.  This made him nauseous, but he denies vomiting or shortness of air.  He did not get diaphoretic.  He attributed his symptoms to acid reflux, but did not get relief from medications for that indication.  The pain continued to come and go and so he presented to our emergency room.    Admission labs and studies significant for high-sensitivity troponin 429 and up to 454.  proBNP 1202.  LDL is 171.  Mild leukocytosis at 13.7.  EKG revealed sinus rhythm with ST depression in inferior leads.    While we are talking he reaches for his neck and complains that his jaw is hurting.      Cardiac risk factors:arteriosclerotic heart disease, hypercholesterolemia, hypertension, smoking, Sedentary life style, and Obesity    Last Echo: 06/06/2023  Interpretation Summary     Left  ventricular systolic function is normal. Left ventricular ejection fraction appears to be 56 - 60%.    Left ventricular wall thickness is consistent with borderline concentric hypertrophy.    Left ventricular diastolic function is consistent with (grade Ia w/high LAP) impaired relaxation.    Estimated right ventricular systolic pressure from tricuspid regurgitation is normal (<35 mmHg).    Mild dilation of the aortic root is present.  Last Stress: No test on record    Last Cath: No test on record      Past Medical History:   Diagnosis Date    Coronary artery disease involving native heart with angina pectoris 11/8/2023    Crohn's disease     Hypertension 2005    Lupus 2019-20    Myocardial infarction 08/18/2013    Primary central sleep apnea [id number]    Sleep apnea, obstructive 2015-17     Past Surgical History:   Procedure Laterality Date    BACK SURGERY      CARDIAC CATHETERIZATION  2014    CERVICAL SPINE SURGERY  2022    TONSILLECTOMY AND ADENOIDECTOMY       Family History   Problem Relation Age of Onset    Hypertension Mother     Stroke Mother     Cancer Maternal Grandmother     Diabetes Paternal Grandmother     Asthma Sister     Hypertension Sister     Diabetes Brother     Hypertension Brother      Social History     Tobacco Use    Smoking status: Every Day     Packs/day: 1.00     Years: 40.00     Additional pack years: 0.00     Total pack years: 40.00     Types: Cigarettes     Start date: 5/1/1983     Passive exposure: Current    Smokeless tobacco: Never   Vaping Use    Vaping Use: Never used   Substance Use Topics    Alcohol use: No    Drug use: No     Medications Prior to Admission   Medication Sig Dispense Refill Last Dose    hydroCHLOROthiazide (HYDRODIURIL) 25 MG tablet Take 1 tablet by mouth Daily. as directed   Past Week    HYDROcodone-acetaminophen (NORCO) 7.5-325 MG per tablet Take 1 tablet by mouth Every 8 (Eight) Hours As Needed for Moderate Pain or Severe Pain.   Past Week    labetalol (NORMODYNE)  200 MG tablet Take 2 tablets by mouth 2 (Two) Times a Day.   Past Week    lisinopril (PRINIVIL,ZESTRIL) 40 MG tablet Take 2 tablets by mouth 2 (Two) Times a Day.   Past Week    omeprazole (priLOSEC) 40 MG capsule Take 1 capsule by mouth 2 (Two) Times a Day.   Past Week    promethazine (PHENERGAN) 25 MG tablet Take 1 tablet by mouth Every 6 (Six) Hours As Needed for Nausea or Vomiting. 90 tablet 1 Past Week    sertraline (ZOLOFT) 100 MG tablet Take 1.5 tablets by mouth Daily.   Past Week    sucralfate (CARAFATE) 1 g tablet Take 1 tablet by mouth 4 (Four) Times a Day.   Past Week    tiZANidine (ZANAFLEX) 4 MG tablet Take 1 tablet by mouth 3 (Three) Times a Day As Needed for Muscle Spasms.   Past Week    Cholecalciferol (Vitamin D3) 1.25 MG (78800 UT) capsule Take 1 capsule by mouth Every 7 (Seven) Days. Fridays   11/3/2023     Allergies:  Ondansetron, Ketorolac, Protonix  [pantoprazole sodium], Metoprolol, Pantoprazole, Penicillin g, Penicillins, and Sulfa antibiotics    Review of Systems   Constitutional:  Negative for diaphoresis.   HENT:          Left jaw pain   Cardiovascular:  Positive for chest pain.   Gastrointestinal:  Positive for abdominal pain and nausea. Negative for vomiting.   Musculoskeletal:  Positive for gait problem.   Neurological:  Positive for headaches. Negative for weakness.         Objective      Vital Signs  Temp:  [97.7 °F (36.5 °C)-98.2 °F (36.8 °C)] 98 °F (36.7 °C)  Heart Rate:  [68-97] 88  Resp:  [20] 20  BP: (103-183)/() 145/94  Body mass index is 34.6 kg/m².    Intake/Output Summary (Last 24 hours) at 11/8/2023 1440  Last data filed at 11/8/2023 1200  Gross per 24 hour   Intake 190.83 ml   Output --   Net 190.83 ml       Physical Exam  Vitals and nursing note reviewed.   Constitutional:       Appearance: He is obese. He is ill-appearing.   HENT:      Head: Normocephalic and atraumatic.      Mouth/Throat:      Comments: Poor dentition  Eyes:      Conjunctiva/sclera: Conjunctivae  normal.   Cardiovascular:      Rate and Rhythm: Normal rate and regular rhythm.      Heart sounds: No murmur heard.  Pulmonary:      Effort: Pulmonary effort is normal.      Breath sounds: Normal breath sounds.   Musculoskeletal:      Right lower leg: No edema.      Left lower leg: No edema.   Skin:     General: Skin is warm and dry.   Neurological:      General: No focal deficit present.      Mental Status: He is alert.   Psychiatric:         Mood and Affect: Mood normal.         Telemetry: Sinus rhythm 90s    Results Review:   I reviewed the patient's new clinical results.  Results from last 7 days   Lab Units 11/08/23  0123 11/07/23  2310   HSTROP T ng/L 454* 429*     Results from last 7 days   Lab Units 11/08/23  0341 11/07/23  2310   WBC 10*3/mm3 13.73* 15.28*   HEMOGLOBIN g/dL 18.5* 18.6*   PLATELETS 10*3/mm3 196 238     Results from last 7 days   Lab Units 11/08/23  0341 11/07/23  2310   SODIUM mmol/L 139 139   POTASSIUM mmol/L 3.6 3.8   CHLORIDE mmol/L 98 97*   CO2 mmol/L 27.4 28.0   BUN mg/dL 13 13   CREATININE mg/dL 1.21 1.18   CALCIUM mg/dL 9.6 9.9   GLUCOSE mg/dL 135* 115*   ALT (SGPT) U/L 25 25   AST (SGOT) U/L 88* 73*     Lab Results   Component Value Date    INR 0.87 (L) 03/12/2022    INR 0.9 02/17/2022    INR 0.9 02/01/2022     Lab Results   Component Value Date    MG 2.0 11/08/2023    MG 1.7 07/14/2023    MG 2.1 05/23/2023     Lab Results   Component Value Date    TRIG 207 (H) 11/08/2023    HDL 51 11/08/2023     (H) 11/08/2023      Lab Results   Component Value Date    PROBNP 1,202.0 (H) 11/08/2023        EKG:     Imaging Results (Last 72 Hours)       Procedure Component Value Units Date/Time    XR Chest 1 View [[id number]] Collected: 11/08/23 0117     Updated: 11/08/23 0121    Narrative:      PROCEDURE: Portable chest x-ray examination performed on November 7, 2023. Single view.     HISTORY: Chest pain.     FINDINGS:     Mild enlarged heart size  Hypoinflated lungs.  Central pulmonary  vascular congestion.  Mild interstitial edema.  No pleural effusion or pneumothorax.  Fixation plate and screws in the lower cervical spine.  Mild osteoarthritis at the glenohumeral joints, right greater than left  with mild hypertrophic changes at the AC joints.       Impression:         1.  Mild enlarged heart size.  2.  Central pulmonary vascular congestion with mild interstitial edema.  3.  Hypoinflated lungs.  4.  No pleural effusion.  5.  No pneumothorax.  6.  Fixation plate and screws in the lower cervical spine.     This report was finalized on 11/8/2023 1:19 AM by Tay Watson MD.                Assessment:  1.  NSTEMI  2.  Classic angina  3.  Hypertension  4. Hyperlipidemia  5.  Smoking        Plan:  1.  Plan to take Mr. Spain for invasive coronary angiogram today.  Continue heparin and nitro infusions until that time.  2.  Further decision making based on results of heart catheterization however would plan to resume home hydrochlorothiazide and lisinopril  3.  Home med list does not include aspirin or statin despite reporting having had a myocardial infarction in the past.  Would recommend initiating both of those  4. strongly recommend smoking cessation.  Nicotine patch has already been ordered by hospitalist.    Risks and benefits of the procedure discussed with the patient and his significant other.  Risks specifically mentioned included bruising/hematoma, bleeding, infection, allergic reaction to medications, renal injury, dysrhythmia, blood clot, heart attack, and stroke.    Thank you very much for asking us to be involved in this patient's care.  We will follow along with you.    Case discussed with Dr. Garcia and plan of care reflects his recommendations.  Further decision making based on results of heart catheterization.    Electronically signed by TATUM Coleman, 11/08/23, 2:54 PM EST.    .Electronically signed by Stacia Garcia MD, 11/08/23, 6:02 PM EST.

## 2023-11-09 PROBLEM — I25.10 CORONARY ARTERY DISEASE: Status: ACTIVE | Noted: 2023-01-01

## 2023-11-09 NOTE — PROGRESS NOTES
HEPARIN INFUSION  Ronaldo Spain is a  53 y.o. male receiving heparin infusion.     Therapy for (VTE/Cardiac):   Cardiac  Patient Dosing Weight: 100 kg  Initial Bolus (Y/N):   N  Any Bolus (Y/N):   Y        Signs or Symptoms of Bleeding: N    Cardiac or Other (Not VTE)   Initial Bolus: 60 units/kg (Max 4,000 units)  Initial rate: 12 units/kg/hr (Max 1,000 units/hr)   Anti Xa Rebolus Infusion Hold time Change infusion Dose (Units/kg/hr) Next Anti Xa or aPTT Level Due   < 0.11 50 Units/kg  (4000 Units Max) None Increase by  3 Units/kg/hr 6 hours   0.11- 0.19 25 Units/kg  (2000 Units Max) None Increase by  2 Units/kg/hr 6 hours   0.2 - 0.29 0 None Increase by  1 Units/kg/hr 6 hours   0.3 - 0.5 0 None No Change 6 hours (after 2 consecutive levels in range check q24h @0700)   0.51 - 0.6 0 None Decrease by  1 Units/kg/hr 6 hours   0.61 - 0.8 0 30 Minutes Decrease by  2 Units/kg/hr 6 hours   0.81 - 1 0 60 Minutes Decrease by  3 Units/kg/hr 6 hours   >1 0 Hold  After Anti Xa less than 0.5 decrease previous rate by  4 Units/kg/hr  Every 2 hours until Anti Xa  less than 0.5 then when infusion restarts in 6 hours       Recommend anti-Xa every 6 hours.          Date   Time   Anti-Xa Current Rate (Unit/kg/hr) Bolus   (Units) Rate Change   (Unit/kg/hr) New Rate (Unit/kg/hr) Next   Anti-Xa Comments  Pump Check Daily   11/09 0645 Drawn  - None +10 10 1300 The patients nurse was not available, so I spoke with Jose Armenta about starting the patients heparin drip.                                                                                                                                                                                                                                   Pharmacy will continue to follow anti-Xa results and monitor for signs and symptoms of bleeding or thrombosis.    Rhonda Concepcion, PharmD  11/09/23 06:44 EST

## 2023-11-09 NOTE — PROGRESS NOTES
LOS: 1 day     Name: Ronaldo Spain  Age/Sex: 53 y.o. male  :  1970        PCP: Kreis, Samuel Duane, MD    Principal Problem:    NSTEMI (non-ST elevated myocardial infarction)      Admission Information: Ronaldo Spain is a 53 y.o. male with a past medical history significant for reported MI, hypertension, hyperlipidemia, Crohn's disease, lupus, GERD/PUD, primary central and obstructive sleep apnea, obesity and smoking.     Chief Complaint: Chest pain    Interval history: Patient went to the Cath Lab yesterday and was found to have 5 significant blockages.  Plan for CABG.  Dr. Hameed consulted and we are awaiting a bed.    Subjective     Patient examined with daughter at bedside.  He discussed his desire to go home.  I noted that his troponins are continuing to trend upward and that he had chest pain overnight.  Strongly recommended he stay and await transfer for his own safety.    Vital Signs  Vital Signs (last 72 hrs)          0759 59  1216   Most Recent      Temp (°F)     97.7    98 -  98.9      96.7     96.7 (35.9)  0800    Heart Rate   69 -  97    68 -  105    90 -  101     97  1110    Resp     20        12 -       19  1000    BP   112/67 -  183/115    103/77 -  161/115    130/94 -  168/98     159/105  1110    SpO2 (%)   91 -  99    91 -  97    90 -  93     90  1000    Flow (L/min)       2       2  1019          Temp:  [96.7 °F (35.9 °C)-98.9 °F (37.2 °C)] 96.7 °F (35.9 °C)  Heart Rate:  [] 97  Resp:  [] 19  BP: (122-168)/() 159/105  Body mass index is 33.76 kg/m².      Intake/Output Summary (Last 24 hours) at 2023 1216  Last data filed at 2023 0900  Gross per 24 hour   Intake 688.25 ml   Output --   Net 688.25 ml       Constitutional:       Appearance: Not in distress. Obese. Acutely ill-appearing.   Pulmonary:      Effort: Pulmonary effort is normal.       Breath sounds: Normal breath sounds.   Cardiovascular:      Normal rate. Regular rhythm.      Murmurs: There is no murmur.   Pulses:     Intact distal pulses.   Edema:     Peripheral edema absent.   Skin:     General: Skin is warm and dry.      Comments: Right femoral access site dressing clean, dry, and intact.  No evidence of ecchymosis or hematoma.   Neurological:      Mental Status: Alert.   Psychiatric:         Mood and Affect: Affect is angry.         Telemetry: Sinus rhythm 90s with PVCs     Results Review:     Results from last 7 days   Lab Units 11/09/23  0052 11/08/23  0341 11/07/23  2310   WBC 10*3/mm3 15.09* 13.73* 15.28*   HEMOGLOBIN g/dL 17.2 18.5* 18.6*   PLATELETS 10*3/mm3 154 196 238     Results from last 7 days   Lab Units 11/09/23  0424 11/08/23  1614 11/08/23  0341 11/07/23  2310   SODIUM mmol/L 133*  --  139 139   POTASSIUM mmol/L 4.1 4.2 3.6 3.8   CHLORIDE mmol/L 99  --  98 97*   CO2 mmol/L 21.8*  --  27.4 28.0   BUN mg/dL 14  --  13 13   CREATININE mg/dL 1.13  --  1.21 1.18   CALCIUM mg/dL 9.1  --  9.6 9.9   GLUCOSE mg/dL 117*  --  135* 115*     Results from last 7 days   Lab Units 11/09/23  0636 11/09/23  0424 11/08/23  0123 11/07/23  2310   HSTROP T ng/L 830* 783* 454* 429*       Results from last 7 days   Lab Units 11/09/23  0636   INR  0.97       I reviewed the patient's new clinical results.  I reviewed the patient's new imaging results and agree with the interpretation.  I personally viewed and interpreted the patient's EKG/Telemetry data      Medication Review:   aspirin, 81 mg, Oral, Daily  atorvastatin, 40 mg, Oral, Nightly  budesonide-formoterol, 2 puff, Inhalation, BID - RT  [START ON 11/10/2023] cholecalciferol, 50,000 Units, Oral, Weekly  famotidine, 20 mg, Oral, BID AC  hydroCHLOROthiazide, 25 mg, Oral, Daily  labetalol, 400 mg, Oral, BID  lisinopril, 40 mg, Oral, Daily  nicotine, 1 patch, Transdermal, Q24H  senna-docusate sodium, 2 tablet, Oral, BID  sertraline, 150 mg, Oral,  Daily  sodium chloride, 10 mL, Intravenous, Q12H  sucralfate, 1 g, Oral, 4x Daily      heparin, 10 Units/kg/hr (Dosing Weight), Last Rate: 10 Units/kg/hr (11/09/23 0716)  nitroglycerin, 5-200 mcg/min, Last Rate: Stopped (11/08/23 2100)  Pharmacy to Dose Heparin,         Assessment:  NSTEMI  Coronary artery disease with 5 blockages noted on heart catheterization  Hypertension  Hyperlipidemia  Smoking      Recommendations:  Continue to plan for transfer to Dr. Hameed's care.  Continuing heparin and nitroglycerin drip.  Statin and aspirin initiated.  We will continue labetalol at current dose  Patient being given his home antihypertensive medications and on nitro drip being titrated per protocol  Statin on board  Continue recommend cessation.  Nicotine patch available    I discussed the patients findings and my recommendations with patient and family.    Case discussed with Dr. Garcia and plan of care reflects his recommendations.      Electronically signed by TATUM Coleman, 11/09/23, 12:22 PM EST.    .Electronically signed by Stacia Garcia MD, 11/09/23, 4:42 PM EST.

## 2023-11-09 NOTE — PLAN OF CARE
Goal Outcome Evaluation:  Plan of Care Reviewed With: patient        Progress: no change  Outcome Evaluation: pt alert and oriented. Pt denies chest pain or SOB. Pt resting in bed at this time. Will continue to follow plan of care til 7a.

## 2023-11-09 NOTE — PROGRESS NOTES
[facility] HOSPITALIST PROGRESS NOTE    Subjective     History:   Ronaldo Spain is a 53 y.o. male admitted on 11/7/2023 secondary to NSTEMI (non-ST elevated myocardial infarction)     Procedures:   11/8/23: LHC    Prox LAD lesion is 60% stenosed.    Mid LAD lesion is 80% stenosed.    1st Mrg lesion is 70% stenosed.    2nd Mrg lesion is 70% stenosed.    RPDA lesion is 90% stenosed.     1.  Cardiac.  Patient with significant coronary artery disease involving multiple vessels.  CT surgery evaluation will be requested.  Dr. Hameed consulted    CC: Follow up NSTEMI    Patient seen and examined with MELA Warner. Awake and alert with his sister present at bedside. Feels better today. No further episodes of CP or dyspnea reported. No reported N/V. No acute events overnight per RN.     History taken from: patient, chart, and RN.      Objective     Vital Signs  Temp:  [96.7 °F (35.9 °C)-98.8 °F (37.1 °C)] 97.9 °F (36.6 °C)  Heart Rate:  [] 84  Resp:  [12-26] 22  BP: (103-168)/() 103/79    Intake/Output Summary (Last 24 hours) at 11/9/2023 1712  Last data filed at 11/9/2023 1300  Gross per 24 hour   Intake 910.25 ml   Output --   Net 910.25 ml         Physical Exam:  General:    Awake, alert, in no acute distress   Heart:      Normal S1 and S2. Regular rate and rhythm. No significant murmur, rubs or gallops appreciated.   Lungs:     Respirations regular, even and unlabored. Lungs clear to auscultation B/L. No wheezes, rales or rhonchi.   Abdomen:   Soft and nontender. No guarding, rebound tenderness or  organomegaly noted. Bowel sounds present x 4.   Extremities:  No clubbing, cyanosis or edema noted. Moves UE and LE equally B/L.     Results Review:    Results from last 7 days   Lab Units 11/09/23  0052 11/08/23  0341 11/07/23  2310   WBC 10*3/mm3 15.09* 13.73* 15.28*   HEMOGLOBIN g/dL 17.2 18.5* 18.6*   PLATELETS 10*3/mm3 154 196 238     Results from last 7 days   Lab Units 11/09/23  0420  11/08/23  1614 11/08/23  0341 11/07/23  2310   SODIUM mmol/L 133*  --  139 139   POTASSIUM mmol/L 4.1 4.2 3.6 3.8   CHLORIDE mmol/L 99  --  98 97*   CO2 mmol/L 21.8*  --  27.4 28.0   BUN mg/dL 14  --  13 13   CREATININE mg/dL 1.13  --  1.21 1.18   CALCIUM mg/dL 9.1  --  9.6 9.9   GLUCOSE mg/dL 117*  --  135* 115*     Results from last 7 days   Lab Units 11/09/23  0424 11/08/23  0341 11/07/23  2310   BILIRUBIN mg/dL 0.9 0.7 0.4   ALK PHOS U/L 79 87 90   AST (SGOT) U/L 54* 88* 73*   ALT (SGPT) U/L 18 25 25     Results from last 7 days   Lab Units 11/09/23  0424 11/08/23  0341   MAGNESIUM mg/dL 2.0 2.0     Results from last 7 days   Lab Units 11/09/23  0636   INR  0.97     Results from last 7 days   Lab Units 11/09/23  0636 11/09/23  0424 11/08/23  0123   HSTROP T ng/L 830* 783* 454*       Imaging Results (Last 24 Hours)       ** No results found for the last 24 hours. **              Medications:  aspirin, 81 mg, Oral, Daily  atorvastatin, 40 mg, Oral, Nightly  budesonide-formoterol, 2 puff, Inhalation, BID - RT  [START ON 11/10/2023] cholecalciferol, 50,000 Units, Oral, Weekly  famotidine, 20 mg, Oral, BID AC  hydroCHLOROthiazide, 25 mg, Oral, Daily  labetalol, 400 mg, Oral, BID  lisinopril, 40 mg, Oral, Daily  nicotine, 1 patch, Transdermal, Q24H  senna-docusate sodium, 2 tablet, Oral, BID  sertraline, 150 mg, Oral, Daily  sodium chloride, 10 mL, Intravenous, Q12H  sucralfate, 1 g, Oral, 4x Daily      heparin, 13 Units/kg/hr (Dosing Weight), Last Rate: 13 Units/kg/hr (11/09/23 1437)  nitroglycerin, 5-200 mcg/min, Last Rate: Stopped (11/08/23 2100)  Pharmacy to Dose Heparin,             Assessment & Plan   NSTEMI: Type I. EKG changes with elevated troponin's on admission. Echo reveals an EF of 56-60%, grade I diastolic dysfunction and mild dilation of the aortic root. Initially required nitro gtt. S/P LHC revealing multivessel CAD. Currently awaiting transfer to [facility] for CT surgery eval. Cont ASA, statin, beta blocker  and heparin gtt. Monitor on telemetry. Cardiology input appreciated.     Essential HTN: BP intermittently elevated. Home medications resumed. Cont labetalol, lisinopril and HCTZ. Cont to monitor.     SIRS: Possibly reactive in the setting of above. WBC elevated. Lactate normalized. Procal is normal.  No evidence of pneumonia on CXR. UA not consistent with UTI. Monitor off antibiotics. Repeat CBC in the AM.     Hyperglycemia/prediabetes: HgbA1c 5.8. Cont to monitor.     Tobacco abuse: Cont NRT and encourage cessation.     Obesity by BMI: Complicates all aspects of care.    DVT PPX: Heparin gtt    Disposition Awaiting transfer to [facility].     Mian Russell DO  11/09/23  17:12 EST

## 2023-11-09 NOTE — PAYOR COMM NOTE
"[facility]  NPI:[id number]    Utilization Review  Contact: Xenia Huerta RN  Phone: [phone]  Fax:[phone]    CLINICAL UPDATE  [id number]      Frannie Tarango (53 y.o. Male)       Date of Birth   1970    Social Security Number       Address   [address] [address]    Home Phone   [phone]    MRN   [id number]       Rastafarian   Non-Jain    Marital Status   Single                            Admission Date   11/7/23    Admission Type   Emergency    Admitting Provider   Maury Talavera MD    Attending Provider   Mian Russell DO    Department, Room/Bed   [facility] PROGRESS CARE, P212/S2       Discharge Date       Discharge Disposition       Discharge Destination                                 Attending Provider: Mian Russell DO    Allergies: Ondansetron, Ketorolac, Protonix  [Pantoprazole Sodium], Metoprolol, Pantoprazole, Penicillin G, Penicillins, Sulfa Antibiotics    Isolation: None   Infection: None   Code Status: CPR    Ht: 170.2 cm (67.01\")   Wt: 97.8 kg (215 lb 9.8 oz)    Admission Cmt: None   Principal Problem: NSTEMI (non-ST elevated myocardial infarction) [I21.4]                   Active Insurance as of 11/7/2023       Primary Coverage       Payor Plan Insurance Group Employer/Plan Group    ANTHEM BLUE CROSS ANTHEM BLUE CROSS BLUE SHIELD PPO [id number]       Payor Plan Address Payor Plan Phone Number Payor Plan Fax Number Effective Dates    [address] [phone]  5/1/2017 - None Entered    [place] [address]         Subscriber Name Subscriber Birth Date Member ID       FRANNIE TARANGO 1970 [id number]               Secondary Coverage       Payor Plan Insurance Group Employer/Plan Group    ANTHEM MEDICAID ANTHEM MEDICAID [id number]       Payor Plan Address Payor Plan Phone Number Payor Plan Fax Number Effective Dates    [address] [address] [phone]  7/7/2022 - None Entered    [facility]" [place] [address]         Subscriber Name Subscriber Birth Date Member ID       FRANNIE TARANGO 1970 [id number]                     Emergency Contacts        (Rel.) Home Phone Work Phone Mobile Phone    Cathi Tarango (Sister) [phone] -- --                 Physician Progress Notes (all)        Kadie Mcadams APRN at 23 1216             LOS: 1 day     Name: Frannie Tarango  Age/Sex: 53 y.o. male  :  1970        PCP: Kreis, Samuel Duane, MD    Principal Problem:    NSTEMI (non-ST elevated myocardial infarction)      Admission Information: Frannie Tarango is a 53 y.o. male with a past medical history significant for reported MI, hypertension, hyperlipidemia, Crohn's disease, lupus, GERD/PUD, primary central and obstructive sleep apnea, obesity and smoking.     Chief Complaint: Chest pain    Interval history: Patient went to the Cath Lab yesterday and was found to have 5 significant blockages.  Plan for CABG.  Dr. Hameed consulted and we are awaiting a bed.    Subjective     Patient examined with daughter at bedside.  He discussed his desire to go home.  I noted that his troponins are continuing to trend upward and that he had chest pain overnight.  Strongly recommended he stay and await transfer for his own safety.    Vital Signs  Vital Signs (last 72 hrs)          0700   0659  0700   0659  07 0659  0700   1216   Most Recent      Temp (°F)     97.7    98 -  98.9      96.7     96.7 (35.9)  0800    Heart Rate   69 -  97    68 -  105    90 -  101     97  1110    Resp     20    14 -  26    12 -  19     19  1000    BP   112/67 -  183/115    103/77 -  161/115    130/94 -  168/98     159/105  1110    SpO2 (%)   91 -  99    91 -  97    90 -  93     90  1000    Flow (L/min)       2       2  1019          Temp:  [96.7 °F (35.9 °C)-98.9 °F (37.2 °C)] 96.7 °F (35.9 °C)  Heart Rate:  [] 97  Resp:  [12-26] 19  BP:  (122-168)/() 159/105  Body mass index is 33.76 kg/m².      Intake/Output Summary (Last 24 hours) at 11/9/2023 1216  Last data filed at 11/9/2023 0900  Gross per 24 hour   Intake 688.25 ml   Output --   Net 688.25 ml       Constitutional:       Appearance: Not in distress. Obese. Acutely ill-appearing.   Pulmonary:      Effort: Pulmonary effort is normal.      Breath sounds: Normal breath sounds.   Cardiovascular:      Normal rate. Regular rhythm.      Murmurs: There is no murmur.   Pulses:     Intact distal pulses.   Edema:     Peripheral edema absent.   Skin:     General: Skin is warm and dry.      Comments: Right femoral access site dressing clean, dry, and intact.  No evidence of ecchymosis or hematoma.   Neurological:      Mental Status: Alert.   Psychiatric:         Mood and Affect: Affect is angry.         Telemetry: Sinus rhythm 90s with PVCs     Results Review:     Results from last 7 days   Lab Units 11/09/23  0052 11/08/23  0341 11/07/23  2310   WBC 10*3/mm3 15.09* 13.73* 15.28*   HEMOGLOBIN g/dL 17.2 18.5* 18.6*   PLATELETS 10*3/mm3 154 196 238     Results from last 7 days   Lab Units 11/09/23  0424 11/08/23  1614 11/08/23  0341 11/07/23  2310   SODIUM mmol/L 133*  --  139 139   POTASSIUM mmol/L 4.1 4.2 3.6 3.8   CHLORIDE mmol/L 99  --  98 97*   CO2 mmol/L 21.8*  --  27.4 28.0   BUN mg/dL 14  --  13 13   CREATININE mg/dL 1.13  --  1.21 1.18   CALCIUM mg/dL 9.1  --  9.6 9.9   GLUCOSE mg/dL 117*  --  135* 115*     Results from last 7 days   Lab Units 11/09/23  0636 11/09/23  0424 11/08/23  0123 11/07/23  2310   HSTROP T ng/L 830* 783* 454* 429*       Results from last 7 days   Lab Units 11/09/23  0636   INR  0.97       I reviewed the patient's new clinical results.  I reviewed the patient's new imaging results and agree with the interpretation.  I personally viewed and interpreted the patient's EKG/Telemetry data      Medication Review:   aspirin, 81 mg, Oral, Daily  atorvastatin, 40 mg, Oral,  Nightly  budesonide-formoterol, 2 puff, Inhalation, BID - RT  [START ON 11/10/2023] cholecalciferol, 50,000 Units, Oral, Weekly  famotidine, 20 mg, Oral, BID AC  hydroCHLOROthiazide, 25 mg, Oral, Daily  labetalol, 400 mg, Oral, BID  lisinopril, 40 mg, Oral, Daily  nicotine, 1 patch, Transdermal, Q24H  senna-docusate sodium, 2 tablet, Oral, BID  sertraline, 150 mg, Oral, Daily  sodium chloride, 10 mL, Intravenous, Q12H  sucralfate, 1 g, Oral, 4x Daily      heparin, 10 Units/kg/hr (Dosing Weight), Last Rate: 10 Units/kg/hr (11/09/23 0716)  nitroglycerin, 5-200 mcg/min, Last Rate: Stopped (11/08/23 2100)  Pharmacy to Dose Heparin,         Assessment:  NSTEMI  Coronary artery disease with 5 blockages noted on heart catheterization  Hypertension  Hyperlipidemia  Smoking      Recommendations:  Continue to plan for transfer to Dr. Hameed's care.  Continuing heparin and nitroglycerin drip.  Statin and aspirin initiated.  We will continue labetalol at current dose  Patient being given his home antihypertensive medications and on nitro drip being titrated per protocol  Statin on board  Continue recommend cessation.  Nicotine patch available    I discussed the patients findings and my recommendations with patient and family.    Case discussed with Dr. Garcia and plan of care reflects his recommendations.      Electronically signed by TATUM Coleman, 11/09/23, 12:22 PM EST.                  Electronically signed by Kadie Mcadams APRN at 11/09/23 1222       Mian Russell DO at 11/08/23 1655          Pt seen and examined this AM with MELA Rhoades. I have reviewed Dr. Talavera' H&P and discussed pt's care with him personally. Pt admitted overnight with NSTEMI. Started on heparin and nitro gtt's on admission. Pt reports improvement in his CP but reports some dyspnea. Reports some HA. S/P LHC today revealing multivessel CAD. Pt has been accepted by CT surgery at [facility] and currently awaiting an available bed.  Cont to monitor in the PCU. Cardiology input appreciated.     Discussed with Dr. Garcia.     Mian Russell DO  11/08/23  16:55 EST               Electronically signed by Mian Russell DO at 11/08/23 2497          Consult Notes (all)        Stacia Garcia MD at 11/08/23 1439          Consults  Date of Admit: 11/7/2023  Date of Consult: 11/08/23  Provider, No Known  Ronaldo Spain  1970    Consulting Physician: Stacia Garcia MD    Cardiology consultation    Reason for consultation: NSTEMI      Chest Pain   Associated symptoms include abdominal pain, headaches and nausea. Pertinent negatives include no diaphoresis, vomiting or weakness.   Abdominal Pain  Associated symptoms include headaches and nausea. Pertinent negatives include no vomiting.   Arm Pain   Associated symptoms include chest pain.   Jaw Pain  Associated symptoms include abdominal pain, chest pain, headaches and nausea. Pertinent negatives include no diaphoresis, vomiting or weakness.   Headache      Subjective     Chief Complaint   Patient presents with    Chest Pain    Abdominal Pain    Arm Pain    Jaw Pain    Headache       Ronaldo Spain is a 53 y.o. male with past medical history significant for reported MI, hypertension, hyperlipidemia, Crohn's disease, lupus, GERD/PUD, primary central and obstructive sleep apnea, obesity and smoking.    He was awakened from sleep 11/07/2023 due to pressure in the center of his chest that radiated into his left arm and jaw.  This made him nauseous, but he denies vomiting or shortness of air.  He did not get diaphoretic.  He attributed his symptoms to acid reflux, but did not get relief from medications for that indication.  The pain continued to come and go and so he presented to our emergency room.    Admission labs and studies significant for high-sensitivity troponin 429 and up to 454.  proBNP 1202.  LDL is 171.  Mild leukocytosis at 13.7.  EKG revealed sinus rhythm with ST depression in  inferior leads.    While we are talking he reaches for his neck and complains that his jaw is hurting.      Cardiac risk factors:arteriosclerotic heart disease, hypercholesterolemia, hypertension, smoking, Sedentary life style, and Obesity    Last Echo: 06/06/2023  Interpretation Summary     Left ventricular systolic function is normal. Left ventricular ejection fraction appears to be 56 - 60%.    Left ventricular wall thickness is consistent with borderline concentric hypertrophy.    Left ventricular diastolic function is consistent with (grade Ia w/high LAP) impaired relaxation.    Estimated right ventricular systolic pressure from tricuspid regurgitation is normal (<35 mmHg).    Mild dilation of the aortic root is present.  Last Stress: No test on record    Last Cath: No test on record      Past Medical History:   Diagnosis Date    Coronary artery disease involving native heart with angina pectoris 11/8/2023    Crohn's disease     Hypertension 2005    Lupus 2019-20    Myocardial infarction 08/18/2013    Primary central sleep apnea [id number]    Sleep apnea, obstructive 2015-17     Past Surgical History:   Procedure Laterality Date    BACK SURGERY      CARDIAC CATHETERIZATION  2014    CERVICAL SPINE SURGERY  2022    TONSILLECTOMY AND ADENOIDECTOMY       Family History   Problem Relation Age of Onset    Hypertension Mother     Stroke Mother     Cancer Maternal Grandmother     Diabetes Paternal Grandmother     Asthma Sister     Hypertension Sister     Diabetes Brother     Hypertension Brother      Social History     Tobacco Use    Smoking status: Every Day     Packs/day: 1.00     Years: 40.00     Additional pack years: 0.00     Total pack years: 40.00     Types: Cigarettes     Start date: 5/1/1983     Passive exposure: Current    Smokeless tobacco: Never   Vaping Use    Vaping Use: Never used   Substance Use Topics    Alcohol use: No    Drug use: No     Medications Prior to Admission   Medication Sig Dispense Refill  Last Dose    hydroCHLOROthiazide (HYDRODIURIL) 25 MG tablet Take 1 tablet by mouth Daily. as directed   Past Week    HYDROcodone-acetaminophen (NORCO) 7.5-325 MG per tablet Take 1 tablet by mouth Every 8 (Eight) Hours As Needed for Moderate Pain or Severe Pain.   Past Week    labetalol (NORMODYNE) 200 MG tablet Take 2 tablets by mouth 2 (Two) Times a Day.   Past Week    lisinopril (PRINIVIL,ZESTRIL) 40 MG tablet Take 2 tablets by mouth 2 (Two) Times a Day.   Past Week    omeprazole (priLOSEC) 40 MG capsule Take 1 capsule by mouth 2 (Two) Times a Day.   Past Week    promethazine (PHENERGAN) 25 MG tablet Take 1 tablet by mouth Every 6 (Six) Hours As Needed for Nausea or Vomiting. 90 tablet 1 Past Week    sertraline (ZOLOFT) 100 MG tablet Take 1.5 tablets by mouth Daily.   Past Week    sucralfate (CARAFATE) 1 g tablet Take 1 tablet by mouth 4 (Four) Times a Day.   Past Week    tiZANidine (ZANAFLEX) 4 MG tablet Take 1 tablet by mouth 3 (Three) Times a Day As Needed for Muscle Spasms.   Past Week    Cholecalciferol (Vitamin D3) 1.25 MG (78544 UT) capsule Take 1 capsule by mouth Every 7 (Seven) Days. Fridays   11/3/2023     Allergies:  Ondansetron, Ketorolac, Protonix  [pantoprazole sodium], Metoprolol, Pantoprazole, Penicillin g, Penicillins, and Sulfa antibiotics    Review of Systems   Constitutional:  Negative for diaphoresis.   HENT:          Left jaw pain   Cardiovascular:  Positive for chest pain.   Gastrointestinal:  Positive for abdominal pain and nausea. Negative for vomiting.   Musculoskeletal:  Positive for gait problem.   Neurological:  Positive for headaches. Negative for weakness.         Objective      Vital Signs  Temp:  [97.7 °F (36.5 °C)-98.2 °F (36.8 °C)] 98 °F (36.7 °C)  Heart Rate:  [68-97] 88  Resp:  [20] 20  BP: (103-183)/() 145/94  Body mass index is 34.6 kg/m².    Intake/Output Summary (Last 24 hours) at 11/8/2023 1440  Last data filed at 11/8/2023 1200  Gross per 24 hour   Intake 190.83 ml    Output --   Net 190.83 ml       Physical Exam  Vitals and nursing note reviewed.   Constitutional:       Appearance: He is obese. He is ill-appearing.   HENT:      Head: Normocephalic and atraumatic.      Mouth/Throat:      Comments: Poor dentition  Eyes:      Conjunctiva/sclera: Conjunctivae normal.   Cardiovascular:      Rate and Rhythm: Normal rate and regular rhythm.      Heart sounds: No murmur heard.  Pulmonary:      Effort: Pulmonary effort is normal.      Breath sounds: Normal breath sounds.   Musculoskeletal:      Right lower leg: No edema.      Left lower leg: No edema.   Skin:     General: Skin is warm and dry.   Neurological:      General: No focal deficit present.      Mental Status: He is alert.   Psychiatric:         Mood and Affect: Mood normal.         Telemetry: Sinus rhythm 90s    Results Review:   I reviewed the patient's new clinical results.  Results from last 7 days   Lab Units 11/08/23  0123 11/07/23  2310   HSTROP T ng/L 454* 429*     Results from last 7 days   Lab Units 11/08/23  0341 11/07/23  2310   WBC 10*3/mm3 13.73* 15.28*   HEMOGLOBIN g/dL 18.5* 18.6*   PLATELETS 10*3/mm3 196 238     Results from last 7 days   Lab Units 11/08/23  0341 11/07/23  2310   SODIUM mmol/L 139 139   POTASSIUM mmol/L 3.6 3.8   CHLORIDE mmol/L 98 97*   CO2 mmol/L 27.4 28.0   BUN mg/dL 13 13   CREATININE mg/dL 1.21 1.18   CALCIUM mg/dL 9.6 9.9   GLUCOSE mg/dL 135* 115*   ALT (SGPT) U/L 25 25   AST (SGOT) U/L 88* 73*     Lab Results   Component Value Date    INR 0.87 (L) 03/12/2022    INR 0.9 02/17/2022    INR 0.9 02/01/2022     Lab Results   Component Value Date    MG 2.0 11/08/2023    MG 1.7 07/14/2023    MG 2.1 05/23/2023     Lab Results   Component Value Date    TRIG 207 (H) 11/08/2023    HDL 51 11/08/2023     (H) 11/08/2023      Lab Results   Component Value Date    PROBNP 1,202.0 (H) 11/08/2023        EKG:     Imaging Results (Last 72 Hours)       Procedure Component Value Units Date/Time    XR  Chest 1 View [[id number]] Collected: 11/08/23 0117     Updated: 11/08/23 0121    Narrative:      PROCEDURE: Portable chest x-ray examination performed on November 7, 2023. Single view.     HISTORY: Chest pain.     FINDINGS:     Mild enlarged heart size  Hypoinflated lungs.  Central pulmonary vascular congestion.  Mild interstitial edema.  No pleural effusion or pneumothorax.  Fixation plate and screws in the lower cervical spine.  Mild osteoarthritis at the glenohumeral joints, right greater than left  with mild hypertrophic changes at the AC joints.       Impression:         1.  Mild enlarged heart size.  2.  Central pulmonary vascular congestion with mild interstitial edema.  3.  Hypoinflated lungs.  4.  No pleural effusion.  5.  No pneumothorax.  6.  Fixation plate and screws in the lower cervical spine.     This report was finalized on 11/8/2023 1:19 AM by Tay Watson MD.                Assessment:  1.  NSTEMI  2.  Classic angina  3.  Hypertension  4. Hyperlipidemia  5.  Smoking        Plan:  1.  Plan to take Mr. Spain for invasive coronary angiogram today.  Continue heparin and nitro infusions until that time.  2.  Further decision making based on results of heart catheterization however would plan to resume home hydrochlorothiazide and lisinopril  3.  Home med list does not include aspirin or statin despite reporting having had a myocardial infarction in the past.  Would recommend initiating both of those  4. strongly recommend smoking cessation.  Nicotine patch has already been ordered by hospitalist.    Risks and benefits of the procedure discussed with the patient and his significant other.  Risks specifically mentioned included bruising/hematoma, bleeding, infection, allergic reaction to medications, renal injury, dysrhythmia, blood clot, heart attack, and stroke.    Thank you very much for asking us to be involved in this patient's care.  We will follow along with you.    Case discussed with   Radha and plan of care reflects his recommendations.  Further decision making based on results of heart catheterization.    Electronically signed by TATUM Coleman, 11/08/23, 2:54 PM EST.    .Electronically signed by Stacia Garcia MD, 11/08/23, 6:02 PM EST.              Electronically signed by Stacia Garcia MD at 11/08/23 6039

## 2023-11-09 NOTE — CASE MANAGEMENT/SOCIAL WORK
Continued Stay Note   [facility]     Patient Name: Ronaldo Spain  MRN: [id number]  Today's Date: 11/9/2023    Admit Date: 11/7/2023    Plan: CM received message from Candida Warner RN that [facility] phoned this AM and stated they have no beds available at this time per donna RN.   Discharge Plan       Row Name 11/09/23 1149       Plan    Plan CM received message from Candida Warner RN that [facility] phoned this AM and stated they have no beds available at this time per donna RN.    Plan Comments 11/9:  IV Heparin, HST 30, TDelta 47, pBNP 1,202.0, Na+ 133, AST 54, CRP 1.33, WBC 15.09, Heart Cath yest=PLAD 60%, MLAD 80%, 1st Mrg 70%, 2nd Mrg 70% & RPDA 90% with significant artery disease involving multiple vessels, Pt continues to ambulate to BR & around room after dangers of ambulation after heart cath given, Pt accepted at [facility] & awaiting bed-[facility]                   Discharge Codes    No documentation.                 Expected Discharge Date and Time       Expected Discharge Date Expected Discharge Time    Nov 10, 2023               Margaret Garcia, RN    
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  Patient denies any insurance or financial issues at this time.  Patient's PCP is Dr. Hernández and he uses [facility] for his prescription needs.  Patient does not utilize Home Health and denies the need at this time.  Patient has a Bipap from unknown provider.  No other issues or concerns are noted at this time.  CM will continue to follow and assist with any discharge needs.   Discharge Plan       Row Name 11/08/23 7547       Plan    Plan Patient is an independent [employer] employee who lives with and takes care of his mother, where he plans to return after discharge from [facility] for CABG.  Patient denies any insurance or financial issues at this time.  Patient's PCP is Dr. Hernández and he uses [facility] for his prescription needs.  Patient does not utilize Home Health and denies the need at this time.  Patient has a Bipap from unknown provider.  No other issues or concerns are noted at this time.  CM will continue to follow and assist with any discharge needs.    Patient/Family in Agreement with Plan yes                   Discharge Codes    No documentation.                 Expected Discharge Date and Time       Expected Discharge Date Expected Discharge Time    Nov 10, 2023               Margaret Garcia RN    
no

## 2023-11-09 NOTE — PLAN OF CARE
Problem: Hypertension Comorbidity  Goal: Blood Pressure in Desired Range  Outcome: Ongoing, Not Progressing  Goal: Blood Pressure in Desired Range  Outcome: Ongoing, Not Progressing     Problem: Pain Acute  Goal: Acceptable Pain Control and Functional Ability  Outcome: Ongoing, Not Progressing  Intervention: Optimize Psychosocial Wellbeing  Recent Flowsheet Documentation  Taken 11/9/2023 1400 by Alana Dickens, RN  Diversional Activities:   smartphone   television  Taken 11/9/2023 0800 by Alana Dickens RN  Diversional Activities:   television   smartphone     Problem: Chest Pain  Goal: Resolution of Chest Pain Symptoms  Outcome: Ongoing, Not Progressing     Problem: Asthma Comorbidity  Goal: Maintenance of Asthma Control  Outcome: Ongoing, Not Progressing     Problem: Behavioral Health Comorbidity  Goal: Maintenance of Behavioral Health Symptom Control  Outcome: Ongoing, Not Progressing     Problem: COPD (Chronic Obstructive Pulmonary Disease) Comorbidity  Goal: Maintenance of COPD Symptom Control  Outcome: Ongoing, Not Progressing     Problem: Diabetes Comorbidity  Goal: Blood Glucose Level Within Targeted Range  Outcome: Ongoing, Not Progressing     Problem: Heart Failure Comorbidity  Goal: Maintenance of Heart Failure Symptom Control  Outcome: Ongoing, Not Progressing     Problem: Obstructive Sleep Apnea Risk or Actual Comorbidity Management  Goal: Unobstructed Breathing During Sleep  Outcome: Ongoing, Not Progressing     Problem: Osteoarthritis Comorbidity  Goal: Maintenance of Osteoarthritis Symptom Control  Outcome: Ongoing, Not Progressing  Intervention: Maintain Osteoarthritis Symptom Control  Recent Flowsheet Documentation  Taken 11/9/2023 1400 by Alana Dickens RN  Activity Management: up ad haseeb     Problem: Pain Chronic (Persistent) (Comorbidity Management)  Goal: Acceptable Pain Control and Functional Ability  Outcome: Ongoing, Not Progressing  Intervention: Optimize Psychosocial Wellbeing  Recent  Flowsheet Documentation  Taken 11/9/2023 1400 by Alana Dickens RN  Diversional Activities:   smartphone   television  Family/Support System Care: support provided  Taken 11/9/2023 0800 by Alana Dickens RN  Diversional Activities:   television   smartphone  Family/Support System Care: support provided     Problem: Seizure Disorder Comorbidity  Goal: Maintenance of Seizure Control  Outcome: Ongoing, Not Progressing     Problem: Adult Inpatient Plan of Care  Goal: Plan of Care Review  Outcome: Ongoing, Not Progressing  Flowsheets (Taken 11/9/2023 1558)  Progress: no change  Plan of Care Reviewed With: patient  Outcome Evaluation: Pt A&Ox4. RA. Heparin gtt currently infusing. Cath site clean, dry, and intact. Lebetalol ordered and administered for HTN. PRN Norco administered. Pt currently in bed verbalizing no new complaints at this time. Call light within reach. Bed locked and in lowest position.  Goal: Patient-Specific Goal (Individualized)  Outcome: Ongoing, Not Progressing  Goal: Absence of Hospital-Acquired Illness or Injury  Outcome: Ongoing, Not Progressing  Intervention: Identify and Manage Fall Risk  Recent Flowsheet Documentation  Taken 11/9/2023 1500 by Alana Dickens RN  Safety Promotion/Fall Prevention: safety round/check completed  Taken 11/9/2023 1300 by Alana Dickens RN  Safety Promotion/Fall Prevention: safety round/check completed  Taken 11/9/2023 1100 by Alana Dickens RN  Safety Promotion/Fall Prevention: safety round/check completed  Taken 11/9/2023 0900 by Alana Dickens RN  Safety Promotion/Fall Prevention: safety round/check completed  Taken 11/9/2023 0700 by Alana Dickens RN  Safety Promotion/Fall Prevention: safety round/check completed  Intervention: Prevent Skin Injury  Recent Flowsheet Documentation  Taken 11/9/2023 1400 by Alana Dickens RN  Skin Protection:   adhesive use limited   incontinence pads utilized   tubing/devices free from skin contact  Taken 11/9/2023 0800 by  Maninder, Alana, RN  Skin Protection:   adhesive use limited   incontinence pads utilized   tubing/devices free from skin contact  Intervention: Prevent and Manage VTE (Venous Thromboembolism) Risk  Recent Flowsheet Documentation  Taken 11/9/2023 1400 by lAana Dickens RN  Activity Management: up ad haseeb  VTE Prevention/Management: (heparin gtt) other (see comments)  Range of Motion: active ROM (range of motion) encouraged  Taken 11/9/2023 0800 by Alana Dickens RN  VTE Prevention/Management: (heparin gtt) other (see comments)  Range of Motion: active ROM (range of motion) encouraged  Goal: Optimal Comfort and Wellbeing  Outcome: Ongoing, Not Progressing  Intervention: Provide Person-Centered Care  Recent Flowsheet Documentation  Taken 11/9/2023 1400 by Alana Dickens RN  Trust Relationship/Rapport:   care explained   choices provided   emotional support provided   empathic listening provided   questions answered   questions encouraged   reassurance provided   thoughts/feelings acknowledged  Taken 11/9/2023 0800 by Alana Dickens RN  Trust Relationship/Rapport:   care explained   choices provided   emotional support provided   empathic listening provided   questions answered   questions encouraged   reassurance provided   thoughts/feelings acknowledged  Goal: Readiness for Transition of Care  Outcome: Ongoing, Not Progressing     Problem: Fall Injury Risk  Goal: Absence of Fall and Fall-Related Injury  Outcome: Ongoing, Not Progressing  Intervention: Promote Injury-Free Environment  Recent Flowsheet Documentation  Taken 11/9/2023 1500 by Alana Dickens RN  Safety Promotion/Fall Prevention: safety round/check completed  Taken 11/9/2023 1300 by Alana Dickens RN  Safety Promotion/Fall Prevention: safety round/check completed  Taken 11/9/2023 1100 by Alana Dickens RN  Safety Promotion/Fall Prevention: safety round/check completed  Taken 11/9/2023 0900 by Alana Dickens RN  Safety Promotion/Fall Prevention:  safety round/check completed  Taken 11/9/2023 0700 by Alana Dickens, RN  Safety Promotion/Fall Prevention: safety round/check completed   Goal Outcome Evaluation:  Plan of Care Reviewed With: patient        Progress: no change  Outcome Evaluation: Pt A&Ox4. RA. Heparin gtt currently infusing. Cath site clean, dry, and intact. Lebetalol ordered and administered for HTN. PRN Norco administered. Pt currently in bed verbalizing no new complaints at this time. Call light within reach. Bed locked and in lowest position.

## 2023-11-09 NOTE — PROGRESS NOTES
[facility]  Ronaldo Spain is a  53 y.o. male receiving heparin infusion.     Therapy for (VTE/Cardiac):   Cardiac  Patient Dosing Weight: 100 kg  Initial Bolus (Y/N):   N  Any Bolus (Y/N):   Y        Signs or Symptoms of Bleeding: N    Cardiac or Other (Not VTE)   Initial Bolus: 60 units/kg (Max 4,000 units)  Initial rate: 12 units/kg/hr (Max 1,000 units/hr)   Anti Xa Rebolus Infusion Hold time Change infusion Dose (Units/kg/hr) Next Anti Xa or aPTT Level Due   < 0.11 50 Units/kg  (4000 Units Max) None Increase by  3 Units/kg/hr 6 hours   0.11- 0.19 25 Units/kg  (2000 Units Max) None Increase by  2 Units/kg/hr 6 hours   0.2 - 0.29 0 None Increase by  1 Units/kg/hr 6 hours   0.3 - 0.5 0 None No Change 6 hours (after 2 consecutive levels in range check q24h @0700)   0.51 - 0.6 0 None Decrease by  1 Units/kg/hr 6 hours   0.61 - 0.8 0 30 Minutes Decrease by  2 Units/kg/hr 6 hours   0.81 - 1 0 60 Minutes Decrease by  3 Units/kg/hr 6 hours   >1 0 Hold  After Anti Xa less than 0.5 decrease previous rate by  4 Units/kg/hr  Every 2 hours until Anti Xa  less than 0.5 then when infusion restarts in 6 hours       Recommend anti-Xa every 6 hours.          Date   Time   Anti-Xa Current Rate (Unit/kg/hr) Bolus   (Units) Rate Change   (Unit/kg/hr) New Rate (Unit/kg/hr) Next   Anti-Xa Comments  Pump Check Daily   11/09 0645 <0.1  - None +10 10 1300 The patients nurse was not available, so I spoke with Jose Armenta about starting the patients heparin drip.     11/9 1326 <0.1 10 4000 +3 13 2100 Discussed bolus and rate increase with nurse Alana. No s/sx bleeding.                                                                                                                                                                                                                      Pharmacy will continue to follow anti-Xa results and monitor for signs and symptoms of bleeding or thrombosis.    Thank you,    Kaylah Miller,  PharmD  11/9/2023  14:29 EST

## 2023-11-10 PROBLEM — G47.30 SLEEP APNEA: Status: ACTIVE | Noted: 2023-01-01

## 2023-11-10 NOTE — CASE MANAGEMENT/SOCIAL WORK
Discharge Planning Assessment  [facility]     Patient Name: Ronaldo Spain  MRN: [id number]  Today's Date: 11/10/2023    Admit Date: 11/9/2023    Plan: home   Discharge Needs Assessment       Row Name 11/10/23 0859       Living Environment    People in Home parent(s)    Name(s) of People in Home Jimi cullen    Current Living Arrangements home    Primary Care Provided by self       Transition Planning    Patient/Family Anticipates Transition to home with family       Discharge Needs Assessment    Readmission Within the Last 30 Days previous discharge plan unsuccessful    Equipment Currently Used at Home bipap    Concerns to be Addressed discharge planning;basic needs                   Discharge Plan       Row Name 11/10/23 0900       Plan    Plan home    Patient/Family in Agreement with Plan yes    Plan Comments I met with this patient at the bedside. He lives with his mother in [facility]. He is independent with activities of daily living and mobility. PT and OT have been consulted. He uses a bipap at home. He anticipates returning home after this hospitalization and his sister can transport. Case management will continue to follow his plan of care and assist with any discharge planning needs.    Final Discharge Disposition Code 01 - home or self-care      Row Name 11/10/23 0816       Plan    Final Discharge Disposition Code 02 - short term hospital for  care    Final Note Patient was discharged and transferred to [facility] via [facility] on 11/9/223.                  Continued Care and Services - Admitted Since 11/9/2023       Destination Coordination complete.      Service Provider Request Status Selected Services Address Phone Fax Patient Preferred    [facility] [place] [phone] -- -- --                  Expected Discharge Date and Time       Expected Discharge Date Expected Discharge Time    Nov 17, 2023            Demographic  Summary       Row Name 11/10/23 0858       General Information    General Information Comments I confirmed that Will Hernández is Mr Grabiel' PCP and he has [place] [employer] and [place] Medicaid per pt                   Functional Status       Row Name 11/10/23 0859       Functional Status, IADL    Medications independent    Meal Preparation independent    Housekeeping independent    Laundry independent    Shopping independent                   Psychosocial    No documentation.                  Abuse/Neglect    No documentation.                  Legal    No documentation.                  Substance Abuse    No documentation.                  Patient Forms    No documentation.                     Korin Monsivais RN

## 2023-11-10 NOTE — PROGRESS NOTES
[facility]    Ronaldo Spain is a 53 y.o. male receiving heparin infusion.    Therapy for (VTE/Cardiac): Cardiac  Patient Weight: 94.5 kg  Initial Bolus (Y/N): No (transferred from OSH on heparin drip)  Any Bolus (Y/N): Yes    Signs or Symptoms of Bleeding: none noted    Cardiac or Other (Not VTE)  Initial Bolus: 60 units/kg (Max 4,000 units)  Initial rate: 12 units/kg/hr (Max 1,000 units/hr)   Anti Xa Rebolus Infusion Hold time Change infusion Dose (Units/kg/hr) Next Anti Xa or aPTT Level Due   < 0.11 50 Units/kg  (4000 Units Max) None Increase by  3 Units/kg/hr 6 hours   0.11- 0.19 25 Units/kg  (2000 Units Max) None Increase by  2 Units/kg/hr 6 hours   0.2 - 0.29 0 None Increase by  1 Units/kg/hr 6 hours   0.3 - 0.5 0 None No Change 6 hours (after 2 consecutive levels in range check qAM)   0.51 - 0.6 0 None Decrease by  1 Units/kg/hr 6 hours   0.61 - 0.8 0 30 Minutes Decrease by  2 Units/kg/hr 6 hours   0.81 - 1 0 60 Minutes Decrease by  3 Units/kg/hr 6 hours   >1 0 Hold  After Anti Xa less than 0.5 decrease previous rate by  4 Units/kg/hr  Every 2 hours until Anti Xa  less than 0.5 then when infusion restarts in 6 hours     Results from last 7 days   Lab Units 11/10/23  0816 11/10/23  0009 11/09/23  0636 11/09/23  0052 11/08/23  0341   INR   --  0.97 0.97  --   --    HEMOGLOBIN g/dL 16.5  --   --  17.2 18.5*   HEMATOCRIT % 51.1*  --   --  52.3* 57.0*   PLATELETS 10*3/mm3 177  --   --  154 196        Date   Time   Anti-Xa Current Rate (Unit/kg/hr) Bolus   (Units) Rate Change   (Unit/kg/hr) New Rate (Unit/kg/hr) Next   Anti-Xa Comments  Pump Check Daily   11/9 2345 STAT 13 -- 0.5 13.5 0600 D/w RN, was on from OSH 13 units/kg/hr based on 100kg.  Adjusted to same rate with new weight of 94.5 kg   11/10 0200 0.1 13.5 +4000 +3 16.5 0800 D/w RN   11/10 0816 0.21 16.5 -- +1.5 18 1600 D/w RN                                                                                                                               Danny Ingram, PharmD, BCPS  11/10/2023  09:56 EST

## 2023-11-10 NOTE — PROGRESS NOTES
"Nutrition Services    Patient Name:  Ronaldo Spain  YOB: 1970  MRN: [id number]  Admit Date:  11/9/2023    Pt screened for MST reports of \"reduced oral intake and difficulty chewing/swallowing.\" Pt clarifies eating well, no nutritional concerns. Reports chronic mild difficulty swallowing that does not require dietary modifications. Denies further dietary needs/preferences. Does report allergy to seafood, entered into EPIC. Will follow per protocol.     Electronically signed by:  Danisha Damon RD  11/10/23 14:09 EST   "

## 2023-11-10 NOTE — CONSULTS
[facility] Medicine Services  CONSULT NOTE      Patient Name: Ronaldo Spain  : 1970  MRN: [id number]    Primary Care Physician: Kreis, Samuel Duane, MD  Provider requesting consultation: No Known Provider    Subjective   Subjective     Reason for Consultation:  Medical management     HPI:  Ronaldo Spain is a 53 y.o. male with PMH of CAD, HTN, lupus, sleep apnea, crohn's dx, cervical spine injury. Patient was transferred from [facility] for evaluation of CAD. He was found to have EKG changes, increased troponin and had heart cath which showed multivessel CAD. He was admitted by CTS and we were asked to consult.     Patient was sitting on side of bed with family at bedside. He is frustrated about surgery being delayed. Concerned about his IV states he is a hard stick. Denies any chest pain.       Review of Systems  Gen- No fevers, chills  CV- No chest pain, palpitations  Resp- No cough, dyspnea  GI- No N/V/D, abd pain    All other systems reviewed and are negative.     Personal History     Past Medical History:   Diagnosis Date    Coronary artery disease involving native heart with angina pectoris 2023    Crohn's colitis     Crohn's disease     Hypertension 2005    Lupus -    Myocardial infarction 2013    Primary central sleep apnea [id number]    Sleep apnea, obstructive -       Past Surgical History:   Procedure Laterality Date    BACK SURGERY      CARDIAC CATHETERIZATION      CARDIAC CATHETERIZATION N/A 2023    Procedure: Left Heart Cath;  Surgeon: Stacia Garcia MD;  Location: [facility] INVASIVE LOCATION;  Service: Cardiology;  Laterality: N/A;    CERVICAL SPINE SURGERY      TONSILLECTOMY AND ADENOIDECTOMY         Family History:  family history includes Asthma in his sister; Cancer in his maternal grandmother; Diabetes in his brother and paternal grandmother; Hypertension in his brother, mother, and sister; Stroke in his  mother. Otherwise pertinent FHx was reviewed and unremarkable.     Social History:  reports that he has been smoking cigarettes. He started smoking about 40 years ago. He has a 40.00 pack-year smoking history. He has been exposed to tobacco smoke. He has never used smokeless tobacco. He reports that he does not drink alcohol and does not use drugs.    Medications:  Budeson-Glycopyrrol-Formoterol, HYDROcodone-acetaminophen, Vitamin D3, albuterol sulfate HFA, benzonatate, hydroCHLOROthiazide, labetalol, lisinopril, omeprazole, promethazine, sertraline, sucralfate, and tiZANidine    Scheduled Meds:aspirin, 81 mg, Oral, Daily  atorvastatin, 40 mg, Oral, Nightly  [START ON 11/13/2023] ceFAZolin, 2,000 mg, Intravenous, On Call to OR  chlorhexidine, 15 mL, Mouth/Throat, On Call to OR  [START ON 11/12/2023] chlorhexidine, 15 mL, Mouth/Throat, Q12H  cholecalciferol, 50,000 Units, Oral, Q7 Days  hydroCHLOROthiazide, 25 mg, Oral, Daily  labetalol, 400 mg, Oral, BID  lansoprazole, 30 mg, Oral, Q AM  lisinopril, 40 mg, Oral, Daily  [START ON 11/12/2023] mupirocin, 1 application , Each Nare, Q12H  nicotine, 1 patch, Transdermal, Q24H  [START ON 11/12/2023] pharmacy consult - MTM, , Does not apply, Daily  sertraline, 150 mg, Oral, Daily  sodium chloride, 10 mL, Intravenous, Q12H  sucralfate, 1 g, Oral, 4x Daily      Continuous Infusions:heparin, 18 Units/kg/hr, Last Rate: 18 Units/kg/hr (11/10/23 1020)  niCARdipine, 5-15 mg/hr, Last Rate: Stopped (11/10/23 0120)  Pharmacy to Dose Heparin,       PRN Meds:.  acetaminophen    albuterol    benzonatate    Chlorhexidine Gluconate Cloth    HYDROcodone-acetaminophen    ipratropium-albuterol    Morphine    Pharmacy to Dose Heparin    promethazine    sodium chloride    sodium chloride    tiZANidine    Allergies   Allergen Reactions    Fish-Derived Products Anaphylaxis    Ondansetron Other (See Comments), Hives and Unknown (See Comments)     Ab pain    Shellfish-Derived Products Anaphylaxis     Ketorolac Itching    Protonix  [Pantoprazole Sodium] Myalgia    Metoprolol Rash and Headache    Pantoprazole Nausea And Vomiting and Unknown (See Comments)    Penicillin G Rash    Penicillins Rash    Sulfa Antibiotics Rash       Objective   Objective     Vital Signs:   Temp:  [96.9 °F (36.1 °C)-98.6 °F (37 °C)] 96.9 °F (36.1 °C)  Heart Rate:  [] 76  Resp:  [18-20] 18  BP: (100-135)/(67-98) 104/77  Flow (L/min):  [2] 2    Physical Exam  Constitutional: No acute distress, awake, alert  HENT: NCAT, mucous membranes moist  Respiratory: Clear to auscultation bilaterally, respiratory effort normal room air 94%  Cardiovascular: RRR, no murmurs, rubs, or gallops  Gastrointestinal: Positive bowel sounds, soft, nontender, nondistended  Musculoskeletal: right ankle edema due to fracture   Psychiatric: Appropriate affect, cooperative  Neurologic: Oriented x 3, strength symmetric in all extremities, Cranial Nerves grossly intact to confrontation, speech clear  Skin: No rashes         Result Review:  I have personally reviewed the results from the time of admission and agree with these findings:  [x]  Laboratory  []  Radiology  [x]  EKG/Telemetry   []  Pathology  []  Old records  []  Other:  Most notable findings include:     LAB RESULTS:      Lab 11/10/23  0816 11/10/23  0009 11/09/23  1326 11/09/23  0636 11/09/23  0052 11/08/23  0922 11/08/23  0819 11/08/23  0647 11/08/23  0341 11/08/23  0123 11/08/23  0012 11/08/23  0012 11/07/23  2310   WBC 13.43*  --   --   --  15.09*  --   --   --  13.73*  --   --   --  15.28*   HEMOGLOBIN 16.5  --   --   --  17.2  --   --   --  18.5*  --   --   --  18.6*   HEMATOCRIT 51.1*  --   --   --  52.3*  --   --   --  57.0*  --   --   --  56.5*   PLATELETS 177  --   --   --  154  --   --   --  196  --   --   --  238   NEUTROS ABS 9.89*  --   --   --  11.14*  --   --   --  10.14*  --   --   --  11.64*   IMMATURE GRANS (ABS) 0.04  --   --   --  0.07*  --   --   --  0.06*  --   --   --  0.05    LYMPHS ABS 2.31  --   --   --  2.32  --   --   --  2.42  --   --   --  2.37   MONOS ABS 1.07*  --   --   --  1.49*  --   --   --  0.98*  --   --   --  1.08*   EOS ABS 0.09  --   --   --  0.03  --   --   --  0.09  --   --   --  0.09   MCV 99.8*  --   --   --  98.7*  --   --   --  100.0*  --   --   --  98.4*   CRP  --   --   --   --   --   --   --   --   --  1.33*  --   --   --    PROCALCITONIN  --   --   --   --   --   --   --   --  0.07  --   --   --   --    LACTATE  --   --   --   --   --  2.0  --  2.1* 2.1*  --   --   --   --    PROTIME  --  13.0  --  13.4  --   --   --   --   --   --   --   --   --    APTT  --  36.0  --  29.8  --   --   --   --   --   --   --   --   --    HEPARIN ANTI-XA 0.21* 0.10* <0.10* <0.10*  --   --  <0.10*  --   --   --    < > <0.10*  --    D DIMER QUANT  --   --   --   --   --   --   --   --   --   --   --  0.28  --     < > = values in this interval not displayed.         Lab 11/10/23  0926 11/09/23  0424 11/08/23  1614 11/08/23  0341 11/07/23  2310   SODIUM 136 133*  --  139 139   POTASSIUM 3.9 4.1 4.2 3.6 3.8   CHLORIDE 99 99  --  98 97*   CO2 27.0 21.8*  --  27.4 28.0   ANION GAP 10.0 12.2  --  13.6 14.0   BUN 20 14  --  13 13   CREATININE 1.01 1.13  --  1.21 1.18   EGFR 87.8 76.8  --  70.7 72.9   GLUCOSE 118* 117*  --  135* 115*   CALCIUM 9.0 9.1  --  9.6 9.9   MAGNESIUM  --  2.0  --  2.0  --    HEMOGLOBIN A1C  --   --   --   --  5.80*         Lab 11/09/23  0424 11/08/23  0341 11/07/23  2310   TOTAL PROTEIN 7.1 7.4 7.7   ALBUMIN 4.0 4.6 4.7   GLOBULIN 3.1 2.8 3.0   ALT (SGPT) 18 25 25   AST (SGOT) 54* 88* 73*   BILIRUBIN 0.9 0.7 0.4   ALK PHOS 79 87 90   LIPASE  --   --  22         Lab 11/10/23  0009 11/09/23  0636 11/09/23 0424 11/08/23  0123 11/07/23  2310   PROBNP  --   --   --  1,202.0*  --    HSTROP T  --  830* 783* 454* 429*   PROTIME 13.0 13.4  --   --   --    INR 0.97 0.97  --   --   --          Lab 11/08/23  0341   CHOLESTEROL 260*   LDL CHOL 171*   HDL CHOL 51    TRIGLYCERIDES 207*         Lab 11/10/23  0109 11/10/23  0009   ABO TYPING B B   RH TYPING Positive Positive   ANTIBODY SCREEN  --  Negative         Brief Urine Lab Results  (Last result in the past 365 days)        Color   Clarity   Blood   Leuk Est   Nitrite   Protein   CREAT   Urine HCG        11/09/23 0628 Yellow   Clear   Moderate (2+)   Negative   Negative   30 mg/dL (1+)                 Microbiology Results (last 10 days)       ** No results found for the last 240 hours. **            Carotid Duplex - Bilateral    Result Date: 11/10/2023    Right internal carotid artery demonstrates normal flow without evidence of hemodynamically significant stenosis.   Left internal carotid artery demonstrates a less than 50% stenosis.     XR Chest 1 View    Result Date: 11/10/2023  XR CHEST 1 VW Date of Exam: 11/10/2023 1:17 AM CST Indication: preop preop Comparison: Chest x-ray 11/7/2023 Findings: The heart is stable in size. No evidence for pneumothorax or pleural effusion. No focal infiltrate. Cervical fusion is present.     Impression: Impression: No acute cardiopulmonary process. Electronically Signed: Megan Parada MD  11/10/2023 6:25 AM CST  Workstation ID: [id number]    Adult Transthoracic Echo Complete w/ Color, Spectral and Contrast if necessary per protocol    Result Date: 11/9/2023    Left ventricular systolic function is normal. Calculated left ventricular EF = 61% Left ventricular ejection fraction appears to be 56 - 60%.   Left ventricular diastolic function is consistent with (grade I) impaired relaxation.   Mild dilation of the aortic root is present.      Results for orders placed during the hospital encounter of 11/07/23    Adult Transthoracic Echo Complete w/ Color, Spectral and Contrast if necessary per protocol    Interpretation Summary    Left ventricular systolic function is normal. Calculated left ventricular EF = 61% Left ventricular ejection fraction appears to be 56 - 60%.    Left ventricular  diastolic function is consistent with (grade I) impaired relaxation.    Mild dilation of the aortic root is present.      Assessment & Plan   Assessment & Plan     Active Hospital Problems    Diagnosis  POA    **Coronary artery disease [I25.10]  Yes    Sleep apnea [G47.30]  Unknown    Essential hypertension [I10]  Yes      Resolved Hospital Problems   No resolved problems to display.     Multivessel CAD  -- care per CTS plan for CABG on 11/13  -- preoperative work up in progress  -- ASA, statin,   -- heparin drip     HTN  -- cont HCTZ, labetalol, lisinopril,     Sleep apnea     Right foot pain from previous fall   -- xray showed subluxation of the talonavicular joint   -- MRI showed medial meniscus thought to represent radial tear   -- needs repair at some point    Lupus      Thank you for allowing [facility] Medicine Service to provide consultative care for your patient, we will continue to follow while clinically appropriate.    Joellen Nation, APRN  11/10/23

## 2023-11-10 NOTE — PROGRESS NOTES
Cardiothoracic Surgery Progress Note      CC: CAD     LOS: 1 day      Subjective:  Denies chest pain this morning    Objective:  Vital Signs  Temp:  [96.9 °F (36.1 °C)-98.6 °F (37 °C)] 96.9 °F (36.1 °C)  Heart Rate:  [] 67  Resp:  [18-26] 18  BP: (100-159)/() 104/77    Physical Exam:   General Appearance: alert, appears stated age and cooperative   Lungs: clear to auscultation, respirations regular, respirations even, and respirations unlabored   Heart: regular rhythm & normal rate, normal S1, S2, no murmur, no gallop, no rub, and no click   Skin:warm, well perfused      Results:    Results from last 7 days   Lab Units 11/10/23  0816   WBC 10*3/mm3 13.43*   HEMOGLOBIN g/dL 16.5   HEMATOCRIT % 51.1*   PLATELETS 10*3/mm3 177     Results from last 7 days   Lab Units 11/10/23  0926   SODIUM mmol/L 136   POTASSIUM mmol/L 3.9   CHLORIDE mmol/L 99   CO2 mmol/L 27.0   BUN mg/dL 20   CREATININE mg/dL 1.01   GLUCOSE mg/dL 118*   CALCIUM mg/dL 9.0       Assessment:  CAD    Plan:  Continue preoperative workup  Continue heparin gtt  Plan for cabg with Dr Hameed next week  Trend p2y12    Chelsea Gillespie PA-C  11/10/23  10:39 EST

## 2023-11-10 NOTE — SIGNIFICANT NOTE
11/10/23 0848   SLP Deferred Reason   SLP Deferred Reason Patient/family declined evaluation    Consult for clinical swallow eval received. Hx dysphagia post-anterior c-spine surgery in March. Has not had prior formal swallowing assessment per report. Explained rationale for evaluation, aspiration risks, but pt declined evaluation. Reported that he is upset with physicians/hospitals. Notified RN. Will attempt to f/u if pt agreeable.

## 2023-11-10 NOTE — PLAN OF CARE
Problem: Adult Inpatient Plan of Care  Goal: Plan of Care Review  Outcome: Ongoing, Progressing  Flowsheets (Taken 11/10/2023 8046)  Plan of Care Reviewed With:   patient   spouse   Goal Outcome Evaluation:  Plan of Care Reviewed With: patient, spouse      SLP evaluation completed. Will address dysphagia with MBS tomorrow. Please see note for further details and recommendations.

## 2023-11-10 NOTE — PAYOR COMM NOTE
"[facility]  NPI:[id number]    Utilization Review  Contact: Xenia Huerta RN  Phone: [phone]  Fax:[phone]    DISCHARGE NOTIFICATION   [id number]        Frannie Tarango (53 y.o. Male)       Date of Birth   1970    Social Security Number       Address   [address] [address]    Home Phone   [phone]    MRN   [id number]       Cheondoism   Non-Sabianist    Marital Status   Single                            Admission Date   11/9/23    Admission Type   Elective    Admitting Provider   Louis Hameed MD    Attending Provider   Louis Hameed MD    Department, Room/Bed   [address], S[address]/1       Discharge Date       Discharge Disposition       Discharge Destination                                 Attending Provider: Louis Hameed MD    Allergies: Ondansetron, Ketorolac, Protonix  [Pantoprazole Sodium], Metoprolol, Pantoprazole, Penicillin G, Penicillins, Sulfa Antibiotics    Isolation: None   Infection: None   Code Status: Prior    Ht: 170.2 cm (67\")   Wt: 94.5 kg (208 lb 4.8 oz)    Admission Cmt: None   Principal Problem: Coronary artery disease [I25.10]                   Active Insurance as of 11/9/2023       Primary Coverage       Payor Plan Insurance Group Employer/Plan Group    WORKERS COMPENSATION [employer]        Payor Plan Address Payor Plan Phone Number Payor Plan Fax Number Effective Dates    [address] [address] [phone]  8/11/2021 - None Entered    ATTN: MAILROOM       [place] [address] [address]         Subscriber Name Subscriber Birth Date Member ID       FRANNIE TARANGO 1970 [id number]                     Emergency Contacts        (Rel.) Home Phone Work Phone Mobile Phone    Cathi Tarango (Sister) [phone] -- --    Jimi Tarango (Mother) -- -- [phone]          Mian Russell DO   Physician  Hospitalist     Discharge Summary     Signed     Date of Service: 11/09/23 1823  Creation " Time: 11/09/23 1823   Related encounter: ED to Hosp-Admission (Discharged) from 11/7/2023 in [facility] PROGRESS CARE with Mian Russell DO and Jewels Pruett MD     Signed              [facility] DISCHARGE SUMMARY        Date of Admission: 11/7/2023     Date of Discharge: 11/9/2023      PCP: Kreis, Samuel Duane, MD     Admission Diagnosis:   Please see admission H&P     Discharge Diagnosis:   NSTEMI  CAD  Essential HTN  SIRS  Hyperglycemia/prediabetes  Tobacco abuse  Obesity by BMI     Procedures Performed:  11/8/23: C    Prox LAD lesion is 60% stenosed.    Mid LAD lesion is 80% stenosed.    1st Mrg lesion is 70% stenosed.    2nd Mrg lesion is 70% stenosed.    RPDA lesion is 90% stenosed.     1.  Cardiac.  Patient with significant coronary artery disease involving multiple vessels.  CT surgery evaluation will be requested.  Dr. Hameed consulted     Consults:   Consults         Date and Time Order Name Status Description     11/8/2023  3:42 AM Inpatient Cardiology Consult                       History of Present Illness:  Ronaldo Spain is a 53 y.o. male who presented to [facility] ED with CC of chest pain. Please see admission H&P for complete details.      In the ED, EKG revealed some ST depression inferiorly. Troponin's were positive and trended upward on repeat. D-dimer was negative. CXR revealed some pulmonary vascular congestion. He was given full dose ASA in the ED with initiation of a heparin gtt.      Hospital Course  Ronaldo Spain was admitted to the PCU for further evaluation and treatment. He was continued on ASA, statin and the heparin gtt. He was hypertensive on admission with persistent CP reported and a nitro gtt was initiated. Cardiology was consulted for further input.      Troponin's continued to trend upward. His CP did improve with initiation of the nitro gtt. An echo was obtained revealing an EF of 56-60%, grade I diastolic dysfunction and mild dilation of the aortic  root. He was evaluated by cardiology and taken for LHC on 11/8 which revealed multivessel CAD. Cardiology contacted CT surgery at [facility] who graciously agreed to accept him in transfer for evaluation of CABG. Following his LHC, the heparin gtt was resumed. He remained CP free and nitro gtt was weaned. He remained hospitalized at our facility until a bed became available. A bed became available in the evening on 11/9 and arrangements were made for transfer to [facility] via ambulance.      Condition on Discharge:  Stable     Vital Signs      Vitals:     11/09/23 1615   BP: 100/67   Pulse: 80   Resp: 19   Temp:     SpO2:           Physical Exam:  General:    Awake, alert, in no acute distress   Heart:      Normal S1 and S2. Regular rate and rhythm. No significant murmur, rubs or gallops appreciated.   Lungs:     Respirations regular, even and unlabored. Lungs clear to auscultation B/L. No wheezes, rales or rhonchi.   Abdomen:   Soft and nontender. No guarding, rebound tenderness or  organomegaly noted. Bowel sounds present x 4.   Extremities:  No clubbing, cyanosis or edema noted. Moves UE and LE equally B/L.      Discharge Disposition:   Transfer to [facility]         Discharge Medications:      Discharge Medications          New Medications         Instructions Start Date   aspirin 81 MG chewable tablet    81 mg, Oral, Daily    Start Date: November 10, 2023      atorvastatin 40 MG tablet  Commonly known as: LIPITOR    40 mg, Oral, Nightly        Heparin (Porcine) 99026-7.45 UT/250ML-% infusion    13 Units/kg/hr (1,300 Units/hr), Intravenous, Titrated        nicotine 14 MG/24HR patch  Commonly known as: NICODERM CQ    1 patch, Transdermal, Every 24 Hours Scheduled    Start Date: November 10, 2023                Changes to Medications         Instructions Start Date   lisinopril 40 MG tablet  Commonly known as: PRINIVIL,ZESTRIL  What changed:   how much to take  when to take this    40 mg, Oral, Daily    Start Date:  November 10, 2023                Continue These Medications         Instructions Start Date   albuterol sulfate  (90 Base) MCG/ACT inhaler  Commonly known as: PROVENTIL HFA;VENTOLIN HFA;PROAIR HFA    2 puffs, Inhalation, Every 4 Hours PRN        benzonatate 200 MG capsule  Commonly known as: TESSALON    200 mg, Oral, 3 Times Daily PRN        Budeson-Glycopyrrol-Formoterol 160-9-4.8 MCG/ACT aerosol inhaler  Commonly known as: BREZTRI    2 puffs, Inhalation, 2 Times Daily        hydroCHLOROthiazide 25 MG tablet  Commonly known as: HYDRODIURIL    25 mg, Oral, Daily, as directed        HYDROcodone-acetaminophen 7.5-325 MG per tablet  Commonly known as: NORCO    1 tablet, Oral, Every 8 Hours PRN        labetalol 200 MG tablet  Commonly known as: NORMODYNE    400 mg, Oral, 2 Times Daily        omeprazole 40 MG capsule  Commonly known as: priLOSEC    Take 1 capsule by mouth 2 (Two) Times a Day.        promethazine 25 MG tablet  Commonly known as: PHENERGAN    25 mg, Oral, Every 6 Hours PRN        sertraline 100 MG tablet  Commonly known as: ZOLOFT    150 mg, Oral, Daily        sucralfate 1 g tablet  Commonly known as: CARAFATE    Take 1 tablet by mouth 4 (Four) Times a Day.        tiZANidine 4 MG tablet  Commonly known as: ZANAFLEX    4 mg, Oral, 3 Times Daily PRN        vitamin D 1.25 MG (02168 UT) capsule capsule  Commonly known as: ERGOCALCIFEROL    50,000 Units, Oral, Weekly        Vitamin D3 1.25 MG (43620 UT) capsule    50,000 Units, Oral, Every 7 Days, Fridays                        Discharge Diet:   Dietary Orders (From admission, onward)          Start     Ordered     11/08/23 1146   Diet: Cardiac Diets; Healthy Heart (2-3 Na+); Texture: Regular Texture (IDDSI 7); Fluid Consistency: Thin (IDDSI 0)  Diet Effective Now        References:    Diet Order [place]   Question Answer Comment   Diets: Cardiac Diets     Cardiac Diet: Healthy Heart (2-3 Na+)     Texture: Regular Texture (IDDSI 7)     Fluid  Consistency: Thin (IDDSI 0)         11/08/23 1147                          Activity at Discharge:  activity as tolerated     Follow-up Appointments:    Follow-up Information         Kreis, Samuel Duane, MD .    Specialty: Family Medicine  Contact information:  [address] [place] [address] [phone]                                   Your Scheduled Appointments       Apr 10, 2024  9:00 AM  Follow Up with TATUM Coburn  [facility] PULMONARY & CRITICAL CARE MEDICINE ([place]) [address]  [facility] [address] [phone]   Established: Please bring outside images or reports.                         Test Results Pending at Discharge:        The ASCVD Risk score (Jeremiah DK, et al., 2019) failed to calculate for the following reasons:    The patient has a prior MI or stroke diagnosis      Mian Russell DO  11/09/23  18:23 EST        Time: Greater than 30 minutes spent on this discharge.                       Routing History

## 2023-11-10 NOTE — CASE MANAGEMENT/SOCIAL WORK
Case Management Discharge Note      Final Note: Patient was discharged and transferred to [facility] via [facility] on 11/9/223.         Selected Continued Care - Admitted Since 11/9/2023       Destination Coordination complete.      Service Provider Selected Services Address Phone Fax Patient Preferred    [address] [place] [address] -- -- --                    Transportation Services  Ambulance: Yol[facility] Scott    Final Discharge Disposition Code: 02 - short term hospital for [facility]

## 2023-11-10 NOTE — H&P
Cardiothoracic Surgery History & Physical           Chief complaint: coronary artery disease    HPI:   Mr. Ronaldo Spain is a 52yo female with PMH of Crohns disease, lupus, HTN, sleep apnea presented as a transfer last night from [facility] for evaluation of coronary artery disease.   He initially presented to [facility] with complaints of chest pain. He was found to have EKG changes, increased troponin, and was taken to the cath lab where he was found to have multivessel CAD. He arrived to [facility] last night on a heparin drip.   Patient denies chest pain, shortness of breath at this time.     Past Medical History:   Diagnosis Date    Coronary artery disease involving native heart with angina pectoris 11/08/2023    Crohn's colitis     Crohn's disease     Hypertension 2005    Lupus 2019-20    Myocardial infarction 08/18/2013    Primary central sleep apnea [id number]    Sleep apnea, obstructive 2015-17     Past Surgical History:   Procedure Laterality Date    BACK SURGERY      CARDIAC CATHETERIZATION  2014    CARDIAC CATHETERIZATION N/A 11/8/2023    Procedure: Left Heart Cath;  Surgeon: Stacia Garcia MD;  Location: [facility] INVASIVE LOCATION;  Service: Cardiology;  Laterality: N/A;    CERVICAL SPINE SURGERY  2022    TONSILLECTOMY AND ADENOIDECTOMY       Family History   Problem Relation Age of Onset    Hypertension Mother     Stroke Mother     Cancer Maternal Grandmother     Diabetes Paternal Grandmother     Asthma Sister     Hypertension Sister     Diabetes Brother     Hypertension Brother      Social History     Tobacco Use    Smoking status: Every Day     Packs/day: 1.00     Years: 40.00     Additional pack years: 0.00     Total pack years: 40.00     Types: Cigarettes     Start date: 5/1/1983     Passive exposure: Current    Smokeless tobacco: Never   Vaping Use    Vaping Use: Never used   Substance Use Topics    Alcohol use: No    Drug use: No       Medications Prior to Admission   Medication  Sig Dispense Refill Last Dose    albuterol sulfate  (90 Base) MCG/ACT inhaler Inhale 2 puffs Every 4 (Four) Hours As Needed for Wheezing. 6.7 g 5 Past Week    aspirin 81 MG chewable tablet Chew 1 tablet Daily.   11/9/2023    atorvastatin (LIPITOR) 40 MG tablet Take 1 tablet by mouth Every Night.   11/9/2023    benzonatate (TESSALON) 200 MG capsule Take 1 capsule by mouth 3 (Three) Times a Day As Needed for Cough. 42 capsule 3 Past Week    Budeson-Glycopyrrol-Formoterol (BREZTRI) 160-9-4.8 MCG/ACT aerosol inhaler Inhale 2 puffs 2 (Two) Times a Day.   11/9/2023    Cholecalciferol (Vitamin D3) 1.25 MG (19853 UT) capsule Take 1 capsule by mouth Every 7 (Seven) Days. Fridays   Past Week    Heparin, Porcine, 37001-7.45 UT/250ML-% infusion Infuse 1,300 Units/hr into a venous catheter Dose Adjusted By Provider As Needed. Indications: Cardiac or other NOT VTE   11/9/2023    hydroCHLOROthiazide (HYDRODIURIL) 25 MG tablet Take 1 tablet by mouth Daily. as directed   Past Week    HYDROcodone-acetaminophen (NORCO) 7.5-325 MG per tablet Take 1 tablet by mouth Every 8 (Eight) Hours As Needed for Moderate Pain or Severe Pain.   11/9/2023    labetalol (NORMODYNE) 200 MG tablet Take 2 tablets by mouth 2 (Two) Times a Day.   11/9/2023    lisinopril (PRINIVIL,ZESTRIL) 40 MG tablet Take 1 tablet by mouth Daily.   11/9/2023    nicotine (NICODERM CQ) 14 MG/24HR patch Place 1 patch on the skin as directed by provider Daily.   11/9/2023    omeprazole (priLOSEC) 40 MG capsule Take 1 capsule by mouth 2 (Two) Times a Day.   11/9/2023    promethazine (PHENERGAN) 25 MG tablet Take 1 tablet by mouth Every 6 (Six) Hours As Needed for Nausea or Vomiting. 90 tablet 1 Past Week    sertraline (ZOLOFT) 100 MG tablet Take 1.5 tablets by mouth Daily.   11/9/2023    sucralfate (CARAFATE) 1 g tablet Take 1 tablet by mouth 4 (Four) Times a Day.   Past Week    tiZANidine (ZANAFLEX) 4 MG tablet Take 1 tablet by mouth 3 (Three) Times a Day As Needed for  Muscle Spasms.   Past Week    vitamin D (ERGOCALCIFEROL) 1.25 MG (91899 UT) capsule capsule Take 1 capsule by mouth 1 (One) Time Per Week. 5 capsule 3 Unknown     Allergies:  Ondansetron, Ketorolac, Protonix  [pantoprazole sodium], Metoprolol, Pantoprazole, Penicillin g, Penicillins, and Sulfa antibiotics    Review of Systems:  A comprehensive review of systems was negative except for:     All other systems were reviewed and were negative.    Vital Signs:  Temp:  [96.7 °F (35.9 °C)-98.6 °F (37 °C)] 98.6 °F (37 °C)  Heart Rate:  [] 74  Resp:  [12-26] 20  BP: (100-168)/() 101/69    Physical Exam:  Physical Exam  Vitals and nursing note reviewed.   Constitutional:       Appearance: Normal appearance.   HENT:      Head: Normocephalic and atraumatic.   Eyes:      Pupils: Pupils are equal, round, and reactive to light.   Cardiovascular:      Rate and Rhythm: Normal rate and regular rhythm.   Pulmonary:      Effort: Pulmonary effort is normal.      Breath sounds: Normal breath sounds.   Abdominal:      Palpations: Abdomen is soft.   Skin:     General: Skin is warm and dry.      Capillary Refill: Capillary refill takes less than 2 seconds.   Neurological:      Mental Status: He is alert and oriented to person, place, and time.   Psychiatric:         Mood and Affect: Mood normal.         Behavior: Behavior normal.         Labs:  Results from last 7 days   Lab Units 11/09/23  0052   WBC 10*3/mm3 15.09*   HEMOGLOBIN g/dL 17.2   HEMATOCRIT % 52.3*   PLATELETS 10*3/mm3 154     Results from last 7 days   Lab Units 11/09/23  0424   SODIUM mmol/L 133*   POTASSIUM mmol/L 4.1   CHLORIDE mmol/L 99   CO2 mmol/L 21.8*   BUN mg/dL 14   CREATININE mg/dL 1.13   GLUCOSE mg/dL 117*   CALCIUM mg/dL 9.1         Coagulation:   INR   Date Value Ref Range Status   11/10/2023 0.97 0.89 - 1.12 Final     Cardiac markers:   Results from last 7 days   Lab Units 11/09/23  0636   HSTROP T ng/L 830*     ABGs:       Invalid input(s):  "\"PO2\"    Imaging:     Left Main   The vessel was visualized by angiography and is moderate in size.      Left Anterior Descending   The vessel was visualized by angiography and is moderate in size. LAD is large vessel is from the left main invent interventricular groove proximal 60 and mid 80% lesion noted. First diagonal is medium and normal Second diagonal is large with ostial 90% lesion noted, mid 99% lesion Large septal branch noted   Prox LAD lesion is 60% stenosed.   Mid LAD lesion is 80% stenosed with 90% stenosed side branch in 2nd Diag.      Left Circumflex   The vessel was visualized by angiography and is moderate in size. Circumflex large vessel arising from the left main and the left AV groove gives off of large first obtuse marginal artery with proximal 70 and distal 80% lesion. Second obtuse marginal artery is large with proximal 70% lesion noted. Beyond origin of second obtuse marginal artery circumflex is noted to be 100% occluded. Intervention attempt was done with attempt to cross the wire across the lesion without success.      First Obtuse Marginal Branch   1st Mrg lesion is 70% stenosed.      Second Obtuse Marginal Branch   2nd Mrg lesion is 70% stenosed.      Right Coronary Artery   The vessel was visualized by angiography and is moderate in size. Right coronary arteries large vessel arising from the right coronary ostium rating the right AV groove proximal and distal tortuous vessel noted. PDA is large with distal 90% lesion noted. Posterolateral is large normal No LV gram done      Right Posterior Descending Artery   RPDA lesion is 90% stenosed.            Assessment:     Coronary artery disease         Plan:   Daily P2Y12 - today is 131  Continue preoperative workup including carotid ultrasound  Plan for CABG on 11/13 with Dr. Zari Michelle PA-C  11/10/23  07:11 EST          "

## 2023-11-10 NOTE — THERAPY EVALUATION
Acute Care - Speech Language Pathology   Swallow Initial Evaluation  [place]  Clinical Swallow Evaluation       Patient Name: Ronaldo Spain  : 1970  MRN: [id number]  Today's Date: 11/10/2023               Admit Date: 2023    Visit Dx:   No diagnosis found.  Patient Active Problem List   Diagnosis    Chest pain    Encounter to establish care    SOB (shortness of breath)    Essential hypertension    NSTEMI (non-ST elevated myocardial infarction)    Coronary artery disease involving native heart with angina pectoris    Coronary artery disease    Sleep apnea     Past Medical History:   Diagnosis Date    Coronary artery disease involving native heart with angina pectoris 2023    Crohn's colitis     Crohn's disease     Hypertension 2005    Lupus -    Myocardial infarction 2013    Primary central sleep apnea [id number]    Sleep apnea, obstructive -     Past Surgical History:   Procedure Laterality Date    BACK SURGERY      CARDIAC CATHETERIZATION      CARDIAC CATHETERIZATION N/A 2023    Procedure: Left Heart Cath;  Surgeon: Stacia Garcia MD;  Location:  [facility]ATH INVASIVE LOCATION;  Service: Cardiology;  Laterality: N/A;    CERVICAL SPINE SURGERY      TONSILLECTOMY AND ADENOIDECTOMY         SLP Recommendation and Plan  SLP Swallowing Diagnosis: R/O pharyngeal dysphagia (11/10/23 1555)  SLP Diet Recommendation: soft to chew textures, chopped, thin liquids (11/10/23 1555)  Recommended Precautions and Strategies: upright posture during/after eating (11/10/23 1555)  SLP Rec. for Method of Medication Administration: as tolerated (11/10/23 1555)     Monitor for Signs of Aspiration: yes, notify SLP if any concerns (11/10/23 1555)  Recommended Diagnostics: VFSS ([facility]), other (see comments) (tomorrow, ) (11/10/23 1555)  Swallow Criteria for Skilled Therapeutic Interventions Met: demonstrates skilled criteria (11/10/23 1555)           Predicted Duration Therapy  Intervention (Days): until discharge (11/10/23 1555)  Oral Care Recommendations: Oral Care BID/PRN (11/10/23 1555)                                      Oral Care Recommendations: Oral Care BID/PRN (11/10/23 1555)    Plan of Care Reviewed With: patient, spouse      SWALLOW EVALUATION (last 72 hours)       SLP Adult Swallow Evaluation       Row Name 11/10/23 1555                   Rehab Evaluation    Document Type evaluation  -DV        Subjective Information no complaints  -DV        Patient Observations alert;cooperative  -DV        Patient/Family/Caregiver Comments/Observations wife present  -DV        Patient Effort excellent  -DV        Symptoms Noted During/After Treatment none  -DV           General Information    Patient Profile Reviewed yes  -DV        Pertinent History Of Current Problem 53yoM, transfer [facility]G. Per consult, hx dysphagia/dislodged trachea after anterior c-spine sx. Pt says was in March & he continues to get choked 1-2x/week. Was supposed to have swallow study at one point, but didn't keep appt. EGD in May- normal esophagus.  -DV        Current Method of Nutrition regular textures;thin liquids  -DV        Precautions/Limitations, Vision WFL with corrective lenses;for purposes of eval  -DV        Precautions/Limitations, Hearing WFL;for purposes of eval  -DV        Prior Level of Function-Communication WFL  -DV        Prior Level of Function-Swallowing no diet consistency restrictions;other (see comments)  chokes on food 1-2x/week  -DV        Plans/Goals Discussed with patient;spouse/S.O.;agreed upon  -DV        Barriers to Rehab none identified  -DV        Patient's Goals for Discharge eat/drink without coughing/choking  -DV        Family Goals for Discharge family did not state  -DV           Pain    Additional Documentation Pain Scale: Numbers Pre/Post-Treatment (Group)  -DV           Pain Scale: Numbers Pre/Post-Treatment    Pretreatment Pain Rating 0/10 - no pain  -DV         Posttreatment Pain Rating 0/10 - no pain  -DV           Oral Motor Structure and Function    Dentition Assessment natural, present and adequate  -DV        Secretion Management WNL/WFL  -DV        Mucosal Quality moist, healthy  -DV           Oral Musculature and Cranial Nerve Assessment    Oral Motor General Assessment WFL  -DV           General Eating/Swallowing Observations    Respiratory Support Currently in Use room air  -DV        Eating/Swallowing Skills self-fed  -DV        Positioning During Eating upright 90 degree  -DV        Utensils Used spoon;straw;cup  -DV        Consistencies Trialed thin liquids;pureed;regular textures  -DV           Respiratory    Respiratory Status WFL  -DV           Clinical Swallow Eval    Oral Prep Phase WFL  -DV        Oral Transit WFL  -DV        Oral Residue WFL  -DV        Pharyngeal Phase no overt signs/symptoms of pharyngeal impairment  -DV        Clinical Swallow Evaluation Summary Pt reported he gets choked 1-2/week on various kinds of food. He has not had full airway obstruction, but says he can tell it's in the wrong place and has to cough it up and re-swallow. Upon assessment, pt presents with no overt s/sx of aspiration with any PO trials. However, based on pt report, recommend downgrade to soft/chopped solids for now with pt to complete MBS tomorrow to assess pharyngeal swallow fx to guide ongoing diet recommendations.  -DV           SLP Evaluation Clinical Impression    SLP Swallowing Diagnosis R/O pharyngeal dysphagia  -DV        Functional Impact no impact on function  -DV        Swallow Criteria for Skilled Therapeutic Interventions Met demonstrates skilled criteria  -DV           Recommendations    Predicted Duration Therapy Intervention (Days) until discharge  -DV        SLP Diet Recommendation soft to chew textures;chopped;thin liquids  -DV        Recommended Diagnostics VFSS (MBS);other (see comments)  tomorrow, 11/11  -DV        Recommended Precautions and  Strategies upright posture during/after eating  -DV        Oral Care Recommendations Oral Care BID/PRN  -DV        SLP Rec. for Method of Medication Administration as tolerated  -DV        Monitor for Signs of Aspiration yes;notify SLP if any concerns  -DV                  User Key  (r) = Recorded By, (t) = Taken By, (c) = Cosigned By      Initials Name Effective Dates    Willa Burt MS CCC-SLP 06/16/21 -                     EDUCATION  The patient has been educated in the following areas:   Dysphagia (Swallowing Impairment).              Time Calculation:    Time Calculation- SLP       Row Name 11/10/23 1629             Time Calculation- SLP    SLP Start Time 1555  -DV      SLP Received On 11/10/23  -DV         Untimed Charges    SLP Eval/Re-eval  ST Eval Oral Pharyng Swallow - 85308  -DV      71039-BG Eval Oral Pharyng Swallow Minutes 40  -DV         Total Minutes    Untimed Charges Total Minutes 40  -DV       Total Minutes 40  -DV                User Key  (r) = Recorded By, (t) = Taken By, (c) = Cosigned By      Initials Name Provider Type    Willa Burt MS CCC-SLP Speech and Language Pathologist                    Therapy Charges for Today       Code Description Service Date Service Provider Modifiers Qty    [id number] HC ST EVAL ORAL PHARYNG SWALLOW 3 11/10/2023 Willa Chavez MS CCC-CHU GN 1                 MS KERA Glaser  11/10/2023

## 2023-11-10 NOTE — PLAN OF CARE
Problem: Adult Inpatient Plan of Care  Goal: Plan of Care Review  Outcome: Ongoing, Progressing  Goal: Patient-Specific Goal (Individualized)  Outcome: Ongoing, Progressing  Goal: Absence of Hospital-Acquired Illness or Injury  Outcome: Ongoing, Progressing  Intervention: Identify and Manage Fall Risk  Recent Flowsheet Documentation  Taken 11/10/2023 0600 by Noris Mohan RN  Safety Promotion/Fall Prevention:   assistive device/personal items within reach   clutter free environment maintained   fall prevention program maintained   nonskid shoes/slippers when out of bed   room organization consistent   safety round/check completed  Taken 11/10/2023 0400 by Noris Mohan RN  Safety Promotion/Fall Prevention:   assistive device/personal items within reach   clutter free environment maintained   fall prevention program maintained   nonskid shoes/slippers when out of bed   room organization consistent   safety round/check completed  Taken 11/10/2023 0200 by Noris Mohan RN  Safety Promotion/Fall Prevention:   assistive device/personal items within reach   clutter free environment maintained   fall prevention program maintained   nonskid shoes/slippers when out of bed   room organization consistent   safety round/check completed  Taken 11/9/2023 2300 by Noris Mohan RN  Safety Promotion/Fall Prevention:   activity supervised   assistive device/personal items within reach   clutter free environment maintained   fall prevention program maintained   nonskid shoes/slippers when out of bed   safety round/check completed   room organization consistent  Intervention: Prevent Skin Injury  Recent Flowsheet Documentation  Taken 11/10/2023 0600 by Noris Mohan RN  Body Position:   position changed independently   weight shifting  Skin Protection:   tubing/devices free from skin contact   transparent dressing maintained   skin-to-device areas padded  Taken 11/10/2023 0400 by Noris Mohan RN  Body  Position:   position changed independently   weight shifting  Skin Protection:   tubing/devices free from skin contact   transparent dressing maintained   skin-to-device areas padded  Taken 11/10/2023 0200 by Noris Mohan RN  Body Position:   position changed independently   weight shifting  Skin Protection:   tubing/devices free from skin contact   transparent dressing maintained   skin-to-device areas padded  Taken 11/9/2023 2300 by Noris Mohan RN  Body Position: position changed independently  Skin Protection:   adhesive use limited   tubing/devices free from skin contact   transparent dressing maintained   skin-to-device areas padded  Intervention: Prevent and Manage VTE (Venous Thromboembolism) Risk  Recent Flowsheet Documentation  Taken 11/10/2023 0600 by Noris Mohan RN  Activity Management: activity encouraged  Taken 11/10/2023 0400 by Noris Mohan RN  Activity Management: up ad haseeb  Taken 11/10/2023 0200 by Noris Mohan RN  Activity Management: activity encouraged  Taken 11/9/2023 2300 by Noris Mohan RN  Activity Management: up ad haseeb  Range of Motion: active ROM (range of motion) encouraged  Intervention: Prevent Infection  Recent Flowsheet Documentation  Taken 11/10/2023 0600 by Noris Mohan RN  Infection Prevention:   environmental surveillance performed   hand hygiene promoted   personal protective equipment utilized   single patient room provided  Taken 11/10/2023 0400 by Nrois Mohan RN  Infection Prevention:   environmental surveillance performed   hand hygiene promoted   personal protective equipment utilized   single patient room provided  Taken 11/10/2023 0200 by Noris Mohan RN  Infection Prevention:   environmental surveillance performed   hand hygiene promoted   personal protective equipment utilized   single patient room provided  Taken 11/9/2023 2300 by Noris Mohan RN  Infection Prevention:   environmental surveillance performed   hand  hygiene promoted   personal protective equipment utilized   single patient room provided  Goal: Optimal Comfort and Wellbeing  Outcome: Ongoing, Progressing  Intervention: Monitor Pain and Promote Comfort  Recent Flowsheet Documentation  Taken 11/10/2023 0400 by Noris Mohan RN  Pain Management Interventions: quiet environment facilitated  Taken 11/9/2023 2300 by Noris Mohan RN  Pain Management Interventions: quiet environment facilitated  Intervention: Provide Person-Centered Care  Recent Flowsheet Documentation  Taken 11/10/2023 0200 by Noris Mohan RN  Trust Relationship/Rapport:   care explained   choices provided   emotional support provided   empathic listening provided   questions answered   questions encouraged  Taken 11/9/2023 2300 by Noris Mohan RN  Trust Relationship/Rapport:   care explained   choices provided   emotional support provided   empathic listening provided   questions answered   questions encouraged   reassurance provided   thoughts/feelings acknowledged  Goal: Readiness for Transition of Care  Outcome: Ongoing, Progressing  Intervention: Mutually Develop Transition Plan  Recent Flowsheet Documentation  Taken 11/9/2023 2330 by Noris Mohan RN  Transportation Anticipated: family or friend will provide  Transportation Concerns: none  Patient/Family Anticipated Services at Transition:   rehabilitation services     Patient/Family Anticipates Transition to: home  Taken 11/9/2023 2315 by Noris Mohan RN  Equipment Currently Used at Home:   bipap   cpap     Problem: Asthma Comorbidity  Goal: Maintenance of Asthma Control  Outcome: Ongoing, Progressing  Intervention: Maintain Asthma Symptom Control  Recent Flowsheet Documentation  Taken 11/10/2023 0600 by Noris Mohan RN  Medication Review/Management: medications reviewed  Taken 11/10/2023 0400 by Noris Mohan RN  Medication Review/Management: medications reviewed  Taken 11/10/2023 0200 by  Noris Mohan RN  Medication Review/Management: medications reviewed  Taken 11/9/2023 2300 by Noris Mohan RN  Medication Review/Management: medications reviewed     Problem: Hypertension Comorbidity  Goal: Blood Pressure in Desired Range  Outcome: Ongoing, Progressing  Intervention: Maintain Blood Pressure Management  Recent Flowsheet Documentation  Taken 11/10/2023 0600 by Noris Mohan RN  Medication Review/Management: medications reviewed  Taken 11/10/2023 0400 by Noris Mohan RN  Medication Review/Management: medications reviewed  Taken 11/10/2023 0200 by Noris Mohan RN  Medication Review/Management: medications reviewed  Taken 11/9/2023 2300 by Noris Mohan RN  Medication Review/Management: medications reviewed     Problem: Obstructive Sleep Apnea Risk or Actual Comorbidity Management  Goal: Unobstructed Breathing During Sleep  Outcome: Ongoing, Progressing     Problem: Pain Chronic (Persistent) (Comorbidity Management)  Goal: Acceptable Pain Control and Functional Ability  Outcome: Ongoing, Progressing  Intervention: Manage Persistent Pain  Recent Flowsheet Documentation  Taken 11/10/2023 0600 by Noris Mohan RN  Medication Review/Management: medications reviewed  Taken 11/10/2023 0400 by Noris Mohan RN  Medication Review/Management: medications reviewed  Taken 11/10/2023 0200 by Noris Mohan RN  Medication Review/Management: medications reviewed  Taken 11/9/2023 2300 by Noris Mohan RN  Medication Review/Management: medications reviewed  Intervention: Develop Pain Management Plan  Recent Flowsheet Documentation  Taken 11/10/2023 0400 by Noris Mohan RN  Pain Management Interventions: quiet environment facilitated  Taken 11/9/2023 2300 by oNris Mohan RN  Pain Management Interventions: quiet environment facilitated  Intervention: Optimize Psychosocial Wellbeing  Recent Flowsheet Documentation  Taken 11/10/2023 0600 by Noris Mohan  RN  Diversional Activities:   television   smartphone  Family/Support System Care: support provided  Taken 11/10/2023 0400 by Noris Mohan RN  Diversional Activities:   television   smartphone  Family/Support System Care: support provided  Taken 11/10/2023 0200 by Noris Mohan RN  Diversional Activities:   television   smartphone  Family/Support System Care: support provided  Taken 11/9/2023 2300 by Noris Mohan RN  Diversional Activities:   television   smartphone  Family/Support System Care: support provided   Goal Outcome Evaluation:

## 2023-11-11 NOTE — THERAPY EVALUATION
Patient Name: Ronaldo Spain  : 1970    MRN: [id number]                              Today's Date: 2023       Admit Date: 2023    Visit Dx: No diagnosis found.  Patient Active Problem List   Diagnosis    Chest pain    Encounter to establish care    SOB (shortness of breath)    Essential hypertension    NSTEMI (non-ST elevated myocardial infarction)    Coronary artery disease involving native heart with angina pectoris    Coronary artery disease    Sleep apnea     Past Medical History:   Diagnosis Date    Coronary artery disease involving native heart with angina pectoris 2023    Crohn's colitis     Crohn's disease     Hypertension 2005    Lupus 2019-    Myocardial infarction 2013    Primary central sleep apnea [id number]    Sleep apnea, obstructive -     Past Surgical History:   Procedure Laterality Date    BACK SURGERY      CARDIAC CATHETERIZATION      CARDIAC CATHETERIZATION N/A 2023    Procedure: Left Heart Cath;  Surgeon: Stacia Garcia MD;  Location: [facility] CATH INVASIVE LOCATION;  Service: Cardiology;  Laterality: N/A;    CERVICAL SPINE SURGERY      TONSILLECTOMY AND ADENOIDECTOMY        General Information       Row Name 23 1415          Physical Therapy Time and Intention    Document Type evaluation  -KW     Mode of Treatment individual therapy;physical therapy  -KW       Row Name 23 1415          General Information    Patient Profile Reviewed yes  -KW     Prior Level of Function independent:;all household mobility;community mobility;gait;transfer;bed mobility  Patient reports that he is in a brace for R knee and ankle after stepping in a hole and causing fracture in ankle and tear in meniscus. Patient reports that he has been able to ambulate  -KW     Existing Precautions/Restrictions fall  -KW     Barriers to Rehab medically complex  -KW       Row Name 23 1415          Living Environment    People in Home parent(s)  -KW       Row Name  11/11/23 1415          Home Main Entrance    Number of Stairs, Main Entrance none  -KW       Row Name 11/11/23 1415          Stairs Within Home, Primary    Number of Stairs, Within Home, Primary none  -KW       [facility] Name 11/11/23 1415          Cognition    Orientation Status (Cognition) oriented x 3  -KW       [facility] Name 11/11/23 1415          Safety Issues, Functional Mobility    Impairments Affecting Function (Mobility) balance;endurance/activity tolerance;pain;shortness of breath;strength  -KW               User Key  (r) = Recorded By, (t) = Taken By, (c) = Cosigned By      Initials Name Provider Type    Marlen Hill PT Physical Therapist                   Mobility       Row Name 11/11/23 1415          Bed Mobility    Bed Mobility supine-sit-supine  -KW     Supine-Sit-Supine Mower (Bed Mobility) supervision  -KW     Assistive Device (Bed Mobility) bed rails;head of bed elevated  -KW               User Key  (r) = Recorded By, (t) = Taken By, (c) = Cosigned By      Initials Name Provider Type    Marlen Hill PT Physical Therapist                   Obj/Interventions    No documentation.                  Goals/Plan       [facility] Name 11/11/23 1415          Bed Mobility Goal 1 (PT)    Activity/Assistive Device (Bed Mobility Goal 1, PT) sit to supine;supine to sit  -KW     Mower Level/Cues Needed (Bed Mobility Goal 1, PT) independent  -KW     Time Frame (Bed Mobility Goal 1, PT) 10 days  -KW       [facility] Name 11/11/23 1415          Transfer Goal 1 (PT)    Activity/Assistive Device (Transfer Goal 1, PT) sit-to-stand/stand-to-sit;bed-to-chair/chair-to-bed  -KW     Mower Level/Cues Needed (Transfer Goal 1, PT) modified independence  -KW     Time Frame (Transfer Goal 1, PT) 10 days  -KW       [facility] Name 11/11/23 1415          Gait Training Goal 1 (PT)    Activity/Assistive Device (Gait Training Goal 1, PT) assistive device use;decrease fall risk;gait (walking locomotion);diminish gait  deviation;improve balance and speed;increase endurance/gait distance;walker, rolling  -KW     Angelina Level (Gait Training Goal 1, PT) modified independence  -KW     Distance (Gait Training Goal 1, PT) 300  -KW     Time Frame (Gait Training Goal 1, PT) 10 days  -KW               User Key  (r) = Recorded By, (t) = Taken By, (c) = Cosigned By      Initials Name Provider Type    KW Marlen Buckley, PT Physical Therapist                   Clinical Impression       Row Name 11/11/23 1415          Pain    Pretreatment Pain Rating 8/10  -KW     Pain Location - Side/Orientation Right  -KW     Pain Location lower  -KW     Pain Location - extremity  -KW     Pain Intervention(s) Repositioned  -KW       Row Name 11/11/23 1415          Plan of Care Review    Plan of Care Reviewed With patient  -KW     Progress no change  -KW     Outcome Evaluation PT eval completed. Patient agreeable to eval and sat at EOB for extended time for history and for RN to pass meds. He became agitated and stated that he no longer wanted to ambulate, stating that the [employer] game was on and he didnt want PT right now. Patient presents below baseline for functional mobility. Patient demonstrates decreased activity tolerance, deconditioning and reduced dynamic balance. Patient would continue to benefit from skilled PT services to improve dynamic balance with gait, transfers strength, and activity tolerance training in order to build endurance and reduce risk of falls.  -KW       Row Name 11/11/23 1415          Therapy Assessment/Plan (PT)    Rehab Potential (PT) good, to achieve stated therapy goals  -KW     Criteria for Skilled Interventions Met (PT) yes;skilled treatment is necessary  -KW     Therapy Frequency (PT) daily  -KW       Row Name 11/11/23 1415          Positioning and Restraints    Pre-Treatment Position in bed  -KW     Post Treatment Position bed  -KW     In Bed supine;call light within reach;encouraged to call for assist;exit alarm on   -KW               User Key  (r) = Recorded By, (t) = Taken By, (c) = Cosigned By      Initials Name Provider Type    Marlen Hill PT Physical Therapist                   Outcome Measures       Row Name 11/11/23 1415 11/11/23 0800       How much help from another person do you currently need...    Turning from your back to your side while in flat bed without using bedrails? 4  -KW 4  -EL    Moving from lying on back to sitting on the side of a flat bed without bedrails? 3  -KW 4  -EL    Moving to and from a bed to a chair (including a wheelchair)? 3  -KW 4  -EL    Standing up from a chair using your arms (e.g., wheelchair, bedside chair)? 3  -KW 4  -EL    Climbing 3-5 steps with a railing? 3  -KW 3  -EL    To walk in hospital room? 3  -KW 4  -EL    AM-PAC 6 Clicks Score (PT) 19  -KW 23  -EL    Highest level of mobility 6 --> Walked 10 steps or more  -KW 7 --> Walked 25 feet or more  -EL      Row Name 11/11/23 1415          Functional Assessment    Outcome Measure Options AM-PAC 6 Clicks Basic Mobility (PT)  -KW               User Key  (r) = Recorded By, (t) = Taken By, (c) = Cosigned By      Initials Name Provider Type    Marlen Hill PT Physical Therapist    Danisha Dobbins RN Registered Nurse                                   PT Recommendation and Plan     Plan of Care Reviewed With: patient  Progress: no change  Outcome Evaluation: PT eval completed. Patient agreeable to eval and sat at EOB for extended time for history and for RN to pass meds. He became agitated and stated that he no longer wanted to ambulate, stating that the [email or web address] game was on and he didnt want PT right now. Patient presents below baseline for functional mobility. Patient demonstrates decreased activity tolerance, deconditioning and reduced dynamic balance. Patient would continue to benefit from skilled PT services to improve dynamic balance with gait, transfers strength, and activity tolerance training in order to  build endurance and reduce risk of falls.     Time Calculation:   PT Evaluation Complexity  History, PT Evaluation Complexity: 3 or more personal factors and/or comorbidities  Examination of Body Systems (PT Eval Complexity): total of 3 or more elements  Clinical Presentation (PT Evaluation Complexity): stable  Clinical Decision Making (PT Evaluation Complexity): low complexity  Overall Complexity (PT Evaluation Complexity): low complexity     PT Charges       Row Name 11/11/23 1415             Time Calculation    Start Time 1415  -KW      PT Received On 11/11/23  -KW      PT Goal Re-Cert Due Date 11/21/23  -KW         Untimed Charges    PT Eval/Re-eval Minutes 35  -KW         Total Minutes    Untimed Charges Total Minutes 35  -KW       Total Minutes 35  -KW                User Key  (r) = Recorded By, (t) = Taken By, (c) = Cosigned By      Initials Name Provider Type    Marlen Hill PT Physical Therapist                  Therapy Charges for Today       Code Description Service Date Service Provider Modifiers Qty    36926502597 HC PT EVAL MOD COMPLEXITY 3 11/11/2023 Marlen Buckley PT GP 1            PT G-Codes  Outcome Measure Options: AM-PAC 6 Clicks Basic Mobility (PT)  AM-PAC 6 Clicks Score (PT): 19  PT Discharge Summary  Anticipated Discharge Disposition (PT): other (see comments) (unable to determine from eval)    Marlen Buckley PT  11/11/2023

## 2023-11-11 NOTE — PLAN OF CARE
Goal Outcome Evaluation:  Plan of Care Reviewed With: patient        Progress: no change  Outcome Evaluation: PT eval completed. Patient agreeable to eval and sat at EOB for extended time for history and for RN to pass meds. He became agitated and stated that he no longer wanted to ambulate, stating that the [employer] game was on and he didnt want PT right now. Patient presents below baseline for functional mobility. Patient demonstrates decreased activity tolerance, deconditioning and reduced dynamic balance. Patient would continue to benefit from skilled PT services to improve dynamic balance with gait, transfers strength, and activity tolerance training in order to build endurance and reduce risk of falls.      Anticipated Discharge Disposition (PT): other (see comments) (unable to determine from eval)

## 2023-11-11 NOTE — PLAN OF CARE
Problem: Adult Inpatient Plan of Care  Goal: Plan of Care Review  Outcome: Ongoing, Progressing  Goal: Patient-Specific Goal (Individualized)  Outcome: Ongoing, Progressing  Goal: Absence of Hospital-Acquired Illness or Injury  Outcome: Ongoing, Progressing  Intervention: Identify and Manage Fall Risk  Recent Flowsheet Documentation  Taken 11/11/2023 0600 by Noris Mohan RN  Safety Promotion/Fall Prevention:   assistive device/personal items within reach   clutter free environment maintained   fall prevention program maintained   nonskid shoes/slippers when out of bed   room organization consistent   safety round/check completed  Taken 11/11/2023 0400 by Noris Mohan RN  Safety Promotion/Fall Prevention:   assistive device/personal items within reach   clutter free environment maintained   fall prevention program maintained   nonskid shoes/slippers when out of bed   room organization consistent   safety round/check completed  Taken 11/11/2023 0200 by Noris Mohan RN  Safety Promotion/Fall Prevention:   assistive device/personal items within reach   clutter free environment maintained   fall prevention program maintained   nonskid shoes/slippers when out of bed   room organization consistent   safety round/check completed  Taken 11/11/2023 0000 by Noris Mohan RN  Safety Promotion/Fall Prevention:   assistive device/personal items within reach   clutter free environment maintained   fall prevention program maintained   nonskid shoes/slippers when out of bed   room organization consistent   safety round/check completed  Taken 11/10/2023 2200 by Noris Mohan RN  Safety Promotion/Fall Prevention:   assistive device/personal items within reach   clutter free environment maintained   fall prevention program maintained   nonskid shoes/slippers when out of bed   room organization consistent   safety round/check completed  Taken 11/10/2023 2000 by Noris Mohan RN  Safety Promotion/Fall  Prevention:   assistive device/personal items within reach   clutter free environment maintained   fall prevention program maintained   nonskid shoes/slippers when out of bed   room organization consistent   safety round/check completed  Intervention: Prevent Skin Injury  Recent Flowsheet Documentation  Taken 11/11/2023 0600 by Noris Mohan RN  Body Position:   position changed independently   weight shifting  Skin Protection:   tubing/devices free from skin contact   transparent dressing maintained   skin-to-device areas padded  Taken 11/11/2023 0400 by Noris Mohan RN  Body Position:   position changed independently   weight shifting  Skin Protection:   tubing/devices free from skin contact   transparent dressing maintained   skin-to-device areas padded  Taken 11/11/2023 0200 by Noris Mohan RN  Body Position:   position changed independently   weight shifting  Skin Protection:   tubing/devices free from skin contact   transparent dressing maintained   skin-to-device areas padded  Taken 11/11/2023 0000 by Noris Mohan RN  Body Position:   position changed independently   weight shifting  Skin Protection:   tubing/devices free from skin contact   transparent dressing maintained   skin-to-device areas padded  Taken 11/10/2023 2200 by Noris Mohan RN  Body Position:   position changed independently   weight shifting  Skin Protection:   tubing/devices free from skin contact   transparent dressing maintained   skin-to-device areas padded  Taken 11/10/2023 2000 by Noris Mohan RN  Body Position:   position changed independently   weight shifting  Skin Protection:   tubing/devices free from skin contact   transparent dressing maintained   skin-to-device areas padded  Intervention: Prevent and Manage VTE (Venous Thromboembolism) Risk  Recent Flowsheet Documentation  Taken 11/11/2023 0600 by Noris Mohan RN  Activity Management:   activity encouraged   up ad haseeb  Taken 11/11/2023 0400 by  Noris Mohan RN  Activity Management:   activity encouraged   up ad haseeb  Taken 11/11/2023 0200 by Noris Mohan RN  Activity Management:   activity encouraged   up ad haseeb  Taken 11/11/2023 0000 by Noris Mohan RN  Activity Management:   activity encouraged   up ad haseeb  Taken 11/10/2023 2200 by Noris Mohan RN  Activity Management:   activity encouraged   up ad haseeb  Taken 11/10/2023 2000 by Noris Mohan RN  Activity Management:   activity encouraged   up ad haseeb  Range of Motion: active ROM (range of motion) encouraged  Intervention: Prevent Infection  Recent Flowsheet Documentation  Taken 11/11/2023 0600 by Noris Mohan RN  Infection Prevention:   environmental surveillance performed   hand hygiene promoted   personal protective equipment utilized   single patient room provided  Taken 11/11/2023 0400 by Noris Mohan RN  Infection Prevention:   environmental surveillance performed   hand hygiene promoted   personal protective equipment utilized   single patient room provided  Taken 11/11/2023 0200 by Noris Mohan RN  Infection Prevention:   environmental surveillance performed   hand hygiene promoted   personal protective equipment utilized   single patient room provided  Taken 11/11/2023 0000 by Noris Mohan RN  Infection Prevention:   environmental surveillance performed   hand hygiene promoted   personal protective equipment utilized   single patient room provided  Taken 11/10/2023 2200 by Noris Mohan RN  Infection Prevention:   environmental surveillance performed   hand hygiene promoted   personal protective equipment utilized   single patient room provided  Taken 11/10/2023 2000 by Noris Mohan RN  Infection Prevention:   environmental surveillance performed   hand hygiene promoted   personal protective equipment utilized   single patient room provided  Goal: Optimal Comfort and Wellbeing  Outcome: Ongoing, Progressing  Intervention: Monitor Pain and Promote  Comfort  Recent Flowsheet Documentation  Taken 11/11/2023 0200 by Noris Mohan RN  Pain Management Interventions: see MAR  Taken 11/11/2023 0000 by Noris Mohan RN  Pain Management Interventions:   pain management plan reviewed with patient/caregiver   relaxation techniques promoted  Taken 11/10/2023 2200 by Noris Mohan RN  Pain Management Interventions: see MAR  Taken 11/10/2023 2000 by Noris Mohan RN  Pain Management Interventions:   pain management plan reviewed with patient/caregiver   see MAR  Goal: Readiness for Transition of Care  Outcome: Ongoing, Progressing     Problem: Asthma Comorbidity  Goal: Maintenance of Asthma Control  Outcome: Ongoing, Progressing  Intervention: Maintain Asthma Symptom Control  Recent Flowsheet Documentation  Taken 11/11/2023 0600 by Noris Mohan RN  Medication Review/Management: medications reviewed  Taken 11/11/2023 0400 by Noris Mohan RN  Medication Review/Management: medications reviewed  Taken 11/11/2023 0200 by Noris Mohan RN  Medication Review/Management: medications reviewed  Taken 11/11/2023 0000 by Noris Mohan RN  Medication Review/Management: medications reviewed  Taken 11/10/2023 2200 by Noris Mohan RN  Medication Review/Management: medications reviewed  Taken 11/10/2023 2000 by Noris Mohan RN  Medication Review/Management: medications reviewed     Problem: Hypertension Comorbidity  Goal: Blood Pressure in Desired Range  Outcome: Ongoing, Progressing  Intervention: Maintain Blood Pressure Management  Recent Flowsheet Documentation  Taken 11/11/2023 0600 by Noris Mohan RN  Medication Review/Management: medications reviewed  Taken 11/11/2023 0400 by Noris Mohan RN  Medication Review/Management: medications reviewed  Taken 11/11/2023 0200 by Noris Mohan RN  Medication Review/Management: medications reviewed  Taken 11/11/2023 0000 by Noris Mohan RN  Medication Review/Management: medications  reviewed  Taken 11/10/2023 2200 by Noris Mohan RN  Medication Review/Management: medications reviewed  Taken 11/10/2023 2000 by Noris Mohan RN  Medication Review/Management: medications reviewed     Problem: Obstructive Sleep Apnea Risk or Actual Comorbidity Management  Goal: Unobstructed Breathing During Sleep  Outcome: Ongoing, Progressing     Problem: Pain Chronic (Persistent) (Comorbidity Management)  Goal: Acceptable Pain Control and Functional Ability  Outcome: Ongoing, Progressing  Intervention: Manage Persistent Pain  Recent Flowsheet Documentation  Taken 11/11/2023 0600 by Noris Mohan RN  Medication Review/Management: medications reviewed  Taken 11/11/2023 0400 by Noris Mohan RN  Medication Review/Management: medications reviewed  Taken 11/11/2023 0200 by Noris Mohan RN  Medication Review/Management: medications reviewed  Taken 11/11/2023 0000 by Noris Mohan RN  Medication Review/Management: medications reviewed  Taken 11/10/2023 2200 by Noris Mohan RN  Medication Review/Management: medications reviewed  Taken 11/10/2023 2000 by Noris Mohan RN  Medication Review/Management: medications reviewed  Intervention: Develop Pain Management Plan  Recent Flowsheet Documentation  Taken 11/11/2023 0200 by Noris Mohan RN  Pain Management Interventions: see MAR  Taken 11/11/2023 0000 by Noris Mohan RN  Pain Management Interventions:   pain management plan reviewed with patient/caregiver   relaxation techniques promoted  Taken 11/10/2023 2200 by Noris Mohan RN  Pain Management Interventions: see MAR  Taken 11/10/2023 2000 by Noris Mohan RN  Pain Management Interventions:   pain management plan reviewed with patient/caregiver   see MAR  Intervention: Optimize Psychosocial Wellbeing  Recent Flowsheet Documentation  Taken 11/11/2023 0600 by Noris Mohan RN  Diversional Activities:   television   smartphone  Family/Support System Care: support  provided  Taken 11/11/2023 0400 by Noris Mohan RN  Diversional Activities:   television   smartphone  Family/Support System Care: support provided  Taken 11/11/2023 0200 by Noris Mohan RN  Diversional Activities:   television   smartphone  Family/Support System Care: support provided  Taken 11/11/2023 0000 by Noris Mohan RN  Diversional Activities:   television   smartphone  Family/Support System Care: support provided  Taken 11/10/2023 2200 by Noris Mohan RN  Diversional Activities:   television   smartphone  Family/Support System Care: support provided  Taken 11/10/2023 2000 by Noris Mohan RN  Diversional Activities:   television   smartphone  Family/Support System Care: support provided     Problem: Fall Injury Risk  Goal: Absence of Fall and Fall-Related Injury  Outcome: Ongoing, Progressing  Intervention: Identify and Manage Contributors  Recent Flowsheet Documentation  Taken 11/11/2023 0600 by Noris Mohan RN  Medication Review/Management: medications reviewed  Taken 11/11/2023 0400 by Noris Mohan RN  Medication Review/Management: medications reviewed  Taken 11/11/2023 0200 by Noris Mohan RN  Medication Review/Management: medications reviewed  Taken 11/11/2023 0000 by Noris Mohan RN  Medication Review/Management: medications reviewed  Taken 11/10/2023 2200 by Noris Mohan RN  Medication Review/Management: medications reviewed  Taken 11/10/2023 2000 by Noris Mohan RN  Medication Review/Management: medications reviewed  Intervention: Promote Injury-Free Environment  Recent Flowsheet Documentation  Taken 11/11/2023 0600 by Noris Mohan RN  Safety Promotion/Fall Prevention:   assistive device/personal items within reach   clutter free environment maintained   fall prevention program maintained   nonskid shoes/slippers when out of bed   room organization consistent   safety round/check completed  Taken 11/11/2023 0400 by Noris Mohan  RN  Safety Promotion/Fall Prevention:   assistive device/personal items within reach   clutter free environment maintained   fall prevention program maintained   nonskid shoes/slippers when out of bed   room organization consistent   safety round/check completed  Taken 11/11/2023 0200 by Noris Mohan RN  Safety Promotion/Fall Prevention:   assistive device/personal items within reach   clutter free environment maintained   fall prevention program maintained   nonskid shoes/slippers when out of bed   room organization consistent   safety round/check completed  Taken 11/11/2023 0000 by Noris Mohan RN  Safety Promotion/Fall Prevention:   assistive device/personal items within reach   clutter free environment maintained   fall prevention program maintained   nonskid shoes/slippers when out of bed   room organization consistent   safety round/check completed  Taken 11/10/2023 2200 by Noris Mohan RN  Safety Promotion/Fall Prevention:   assistive device/personal items within reach   clutter free environment maintained   fall prevention program maintained   nonskid shoes/slippers when out of bed   room organization consistent   safety round/check completed  Taken 11/10/2023 2000 by Noris Mohan RN  Safety Promotion/Fall Prevention:   assistive device/personal items within reach   clutter free environment maintained   fall prevention program maintained   nonskid shoes/slippers when out of bed   room organization consistent   safety round/check completed   Goal Outcome Evaluation:   PT Anti-Xa came down to 0.1, gave heparin bolus and increasing rate 22 units/kg/hr. Gave pt norco for pain in ankle, pt reports that it was not effective and had better improvement with morphine. Pt got 2 18g ultrasound guided IV.'s, tolerated well.

## 2023-11-11 NOTE — PROGRESS NOTES
HEPARIN INFUSION    Ronaldo Spain is a 53 y.o. male receiving heparin infusion.    Therapy for (VTE/Cardiac): Cardiac  Patient Weight: 94.5 kg  Initial Bolus (Y/N): No (transferred from OSH on heparin drip)  Any Bolus (Y/N): Yes    Signs or Symptoms of Bleeding: none noted    Cardiac or Other (Not VTE)  Initial Bolus: 60 units/kg (Max 4,000 units)  Initial rate: 12 units/kg/hr (Max 1,000 units/hr)   Anti Xa Rebolus Infusion Hold time Change infusion Dose (Units/kg/hr) Next Anti Xa or aPTT Level Due   < 0.11 50 Units/kg  (4000 Units Max) None Increase by  3 Units/kg/hr 6 hours   0.11- 0.19 25 Units/kg  (2000 Units Max) None Increase by  2 Units/kg/hr 6 hours   0.2 - 0.29 0 None Increase by  1 Units/kg/hr 6 hours   0.3 - 0.5 0 None No Change 6 hours (after 2 consecutive levels in range check qAM)   0.51 - 0.6 0 None Decrease by  1 Units/kg/hr 6 hours   0.61 - 0.8 0 30 Minutes Decrease by  2 Units/kg/hr 6 hours   0.81 - 1 0 60 Minutes Decrease by  3 Units/kg/hr 6 hours   >1 0 Hold  After Anti Xa less than 0.5 decrease previous rate by  4 Units/kg/hr  Every 2 hours until Anti Xa  less than 0.5 then when infusion restarts in 6 hours     Results from last 7 days   Lab Units 11/10/23  0816 11/10/23  0009 11/09/23  0636 11/09/23  0052 11/08/23  0341   INR   --  0.97 0.97  --   --    HEMOGLOBIN g/dL 16.5  --   --  17.2 18.5*   HEMATOCRIT % 51.1*  --   --  52.3* 57.0*   PLATELETS 10*3/mm3 177  --   --  154 196        Date   Time   Anti-Xa Current Rate (Unit/kg/hr) Bolus   (Units) Rate Change   (Unit/kg/hr) New Rate (Unit/kg/hr) Next   Anti-Xa Comments  Pump Check Daily   11/9 2345 STAT 13 -- 0.5 13.5 0600 D/w RN, was on from OSH 13 units/kg/hr based on 100kg.  Adjusted to same rate with new weight of 94.5 kg   11/10 0200 0.1 13.5 +4000 +3 16.5 0800 D/w RN   11/10 0816 0.21 16.5 -- +1.5 18 1600 D/w RN   11/10 1536 0.17 18 +2000 +2 20 2300 D/w RN - no s/sx of bleeding or changes   11/10 2239 0.11 20 +2000 +2 22 0700 D/w RN    11/11 0913 0.13 22 +2000 +2 24 1800 D/w MELA Acevedo, pump verified                                                                                             Vikash Lindsay, PharmD  11/11/2023  10:53 EST

## 2023-11-11 NOTE — THERAPY RE-EVALUATION
Acute Care - Speech Language Pathology   Swallow Re-Evaluation [facility]     Patient Name: Ronaldo Spain  : 1970  MRN: [id number]  Today's Date: 2023               Admit Date: 2023    Visit Dx:   No diagnosis found.  Patient Active Problem List   Diagnosis    Chest pain    Encounter to establish care    SOB (shortness of breath)    Essential hypertension    NSTEMI (non-ST elevated myocardial infarction)    Coronary artery disease involving native heart with angina pectoris    Coronary artery disease    Sleep apnea     Past Medical History:   Diagnosis Date    Coronary artery disease involving native heart with angina pectoris 2023    Crohn's colitis     Crohn's disease     Hypertension 2005    Lupus -    Myocardial infarction 2013    Primary central sleep apnea [id number]    Sleep apnea, obstructive -     Past Surgical History:   Procedure Laterality Date    BACK SURGERY      CARDIAC CATHETERIZATION      CARDIAC CATHETERIZATION N/A 2023    Procedure: Left Heart Cath;  Surgeon: Stacia Garcia MD;  Location:  [facility];  Service: Cardiology;  Laterality: N/A;    CERVICAL SPINE SURGERY      TONSILLECTOMY AND ADENOIDECTOMY         SLP Recommendation and Plan     SLP Diet Recommendation: other (see comments) (cont current diet as tolerated) (23)              Recommended Diagnostics: reassess via clinical swallow evaluation, other (see comments) (re-assess after surgery) (23)                                                             Progress: no change      SWALLOW EVALUATION (last 72 hours)       SLP Adult Swallow Evaluation       Row Name 11/11/23 0900 11/10/23 1488                Rehab Evaluation    Document Type --  Pt/caregiver instruction  -AW evaluation  -DV       Subjective Information no complaints  -AW no complaints  -DV       Patient Observations alert;cooperative  -AW alert;cooperative  -DV        Patient/Family/Caregiver Comments/Observations wife present  -AW wife present  -DV       Session Not Performed other (see comments)  education/POC discussion  -AW --       Evaluation Not Performed, Comment pt is tolerating regular diet without issue. Given that pt is going for CABG on Monday, agree best to wait for swallow evaluation post operatively. Pt will be at risk of dysphagia following intubation and surgery. At this time, difficulty is only very intermittent. Pt/wife/RN in agreement for f/u next week.  -AW --       Patient Effort good  -AW excellent  -DV       Comment will cont current diet until made NPO for surgery  -AW --       Symptoms Noted During/After Treatment none  -AW none  -DV          General Information    Patient Profile Reviewed -- yes  -DV       Pertinent History Of Current Problem -- 53yoM, transfer from [facility] for CABG. Per consult, hx dysphagia/dislodged trachea after anterior c-spine sx. Pt says was in March & he continues to get choked 1-2x/week. Was supposed to have swallow study at one point, but didn't keep appt. EGD in May- normal esophagus.  -DV       Current Method of Nutrition -- regular textures;thin liquids  -DV       Precautions/Limitations, Vision -- WFL with corrective lenses;for purposes of eval  -DV       Precautions/Limitations, Hearing -- WFL;for purposes of eval  -DV       Prior Level of Function-Communication -- WFL  -DV       Prior Level of Function-Swallowing -- no diet consistency restrictions;other (see comments)  chokes on food 1-2x/week  -DV       Plans/Goals Discussed with -- patient;spouse/S.O.;agreed upon  -DV       Barriers to Rehab -- none identified  -DV       Patient's Goals for Discharge -- eat/drink without coughing/choking  -DV       Family Goals for Discharge -- family did not state  -DV          Pain    Additional Documentation -- Pain Scale: Numbers Pre/Post-Treatment (Group)  -DV          Pain Scale: Numbers Pre/Post-Treatment    Pretreatment Pain  Rating -- 0/10 - no pain  -DV       Posttreatment Pain Rating -- 0/10 - no pain  -DV          Oral Motor Structure and Function    Dentition Assessment -- natural, present and adequate  -DV       Secretion Management -- WNL/WFL  -DV       Mucosal Quality -- moist, healthy  -DV          Oral Musculature and Cranial Nerve Assessment    Oral Motor General Assessment -- WFL  -DV          General Eating/Swallowing Observations    Respiratory Support Currently in Use -- room air  -DV       Eating/Swallowing Skills -- self-fed  -DV       Positioning During Eating -- upright 90 degree  -DV       Utensils Used -- spoon;straw;cup  -DV       Consistencies Trialed -- thin liquids;pureed;regular textures  -DV          Respiratory    Respiratory Status -- WFL  -DV          Clinical Swallow Eval    Oral Prep Phase -- WFL  -DV       Oral Transit -- WFL  -DV       Oral Residue -- WFL  -DV       Pharyngeal Phase -- no overt signs/symptoms of pharyngeal impairment  -DV       Clinical Swallow Evaluation Summary -- Pt reported he gets choked 1-2/week on various kinds of food. He has not had full airway obstruction, but says he can tell it's in the wrong place and has to cough it up and re-swallow. Upon assessment, pt presents with no overt s/sx of aspiration with any PO trials. However, based on pt report, recommend downgrade to soft/chopped solids for now with pt to complete MBS tomorrow to assess pharyngeal swallow fx to guide ongoing diet recommendations.  -DV          SLP Evaluation Clinical Impression    SLP Swallowing Diagnosis -- R/O pharyngeal dysphagia  -DV       Functional Impact -- no impact on function  -DV       Swallow Criteria for Skilled Therapeutic Interventions Met -- demonstrates skilled criteria  -DV          Recommendations    Predicted Duration Therapy Intervention (Days) -- until discharge  -DV       SLP Diet Recommendation other (see comments)  cont current diet as tolerated  -AW soft to chew  textures;chopped;thin liquids  -DV       Recommended Diagnostics reassess via clinical swallow evaluation;other (see comments)  re-assess after surgery  -AW VFSS (MBS);other (see comments)  tomorrow, 11/11  -DV       Recommended Precautions and Strategies -- upright posture during/after eating  -DV       Oral Care Recommendations -- Oral Care BID/PRN  -DV       SLP Rec. for Method of Medication Administration -- as tolerated  -DV       Monitor for Signs of Aspiration -- yes;notify SLP if any concerns  -DV                 User Key  (r) = Recorded By, (t) = Taken By, (c) = Cosigned By      Initials Name Effective Dates    Kaia Serra MS CCC-SLP 02/03/23 -     Willa Burt MS CCC-SLP 06/16/21 -                     EDUCATION  The patient has been educated in the following areas:   Dysphagia (Swallowing Impairment).              Time Calculation:    Time Calculation- SLP       Row Name 11/11/23 0923             Time Calculation- SLP    SLP Start Time 0830  -AW      SLP Stop Time 0900  -AW      SLP Time Calculation (min) 30 min  -      SLP Received On 11/11/23  -                User Key  (r) = Recorded By, (t) = Taken By, (c) = Cosigned By      Initials Name Provider Type    Kaia Serra MS CCC-SLP Speech and Language Pathologist                             Kaia Blanchard MS CCC-SLP  11/11/2023

## 2023-11-11 NOTE — PROGRESS NOTES
[facility] Medicine Services  PROGRESS NOTE    Patient Name: Ronaldo Spain  : 1970  MRN: [id number]    Date of Admission: 2023  Primary Care Physician: Kreis, Samuel Duane, MD    Subjective   Subjective     CC:  CAD    HPI:  frustrated that he is having to wait for his CABG. Otherwise no events overnight. Remains on heparin ggt      Objective   Objective     Vital Signs:   Temp:  [96.4 °F (35.8 °C)-97 °F (36.1 °C)] 96.4 °F (35.8 °C)  Heart Rate:  [69-78] 69  Resp:  [18-19] 18  BP: (102-121)/(76-85) 114/85     Physical Exam:  Constitutional: No acute distress, awake, alert  Respiratory: Clear to auscultation bilaterally, respiratory effort normal   Cardiovascular: RRR, no murmurs, rubs, or gallops  Gastrointestinal: Positive bowel sounds, soft, nontender, nondistended  Psychiatric: Appropriate affect, cooperative  Neurologic: Oriented x 3, no focal deficits      Results Reviewed:  LAB RESULTS:      Lab 23  0913 11/10/23  2229 11/10/23  1536 11/10/23  0816 11/10/23  0009 23  1326 23  0636 23  0052 23  0922 23  0819 23  0647 23  0341 23  0123 23  0012 23  2310   WBC  --   --   --  13.43*  --   --   --  15.09*  --   --   --  13.73*  --   --  15.28*   HEMOGLOBIN  --   --   --  16.5  --   --   --  17.2  --   --   --  18.5*  --   --  18.6*   HEMATOCRIT  --   --   --  51.1*  --   --   --  52.3*  --   --   --  57.0*  --   --  56.5*   PLATELETS  --   --   --  177  --   --   --  154  --   --   --  196  --   --  238   NEUTROS ABS  --   --   --  9.89*  --   --   --  11.14*  --   --   --  10.14*  --   --  11.64*   IMMATURE GRANS (ABS)  --   --   --  0.04  --   --   --  0.07*  --   --   --  0.06*  --   --  0.05   LYMPHS ABS  --   --   --  2.31  --   --   --  2.32  --   --   --  2.42  --   --  2.37   MONOS ABS  --   --   --  1.07*  --   --   --  1.49*  --   --   --  0.98*  --   --  1.08*   EOS ABS  --   --   --  0.09  --    --   --  0.03  --   --   --  0.09  --   --  0.09   MCV  --   --   --  99.8*  --   --   --  98.7*  --   --   --  100.0*  --   --  98.4*   CRP  --   --   --   --   --   --   --   --   --   --   --   --  1.33*  --   --    PROCALCITONIN  --   --   --   --   --   --   --   --   --   --   --  0.07  --   --   --    LACTATE  --   --   --   --   --   --   --   --  2.0  --  2.1* 2.1*  --   --   --    PROTIME  --   --   --   --  13.0  --  13.4  --   --   --   --   --   --   --   --    APTT  --   --   --   --  36.0  --  29.8  --   --   --   --   --   --   --   --    HEPARIN ANTI-XA 0.13* 0.11* 0.17* 0.21* 0.10*   < > <0.10*  --   --    < >  --   --   --  <0.10*  --    D DIMER QUANT  --   --   --   --   --   --   --   --   --   --   --   --   --  0.28  --     < > = values in this interval not displayed.         Lab 11/10/23  0926 11/09/23  0424 11/08/23  1614 11/08/23  0341 11/07/23  2310   SODIUM 136 133*  --  139 139   POTASSIUM 3.9 4.1 4.2 3.6 3.8   CHLORIDE 99 99  --  98 97*   CO2 27.0 21.8*  --  27.4 28.0   ANION GAP 10.0 12.2  --  13.6 14.0   BUN 20 14  --  13 13   CREATININE 1.01 1.13  --  1.21 1.18   EGFR 87.8 76.8  --  70.7 72.9   GLUCOSE 118* 117*  --  135* 115*   CALCIUM 9.0 9.1  --  9.6 9.9   MAGNESIUM  --  2.0  --  2.0  --    HEMOGLOBIN A1C  --   --   --   --  5.80*         Lab 11/09/23  0424 11/08/23  0341 11/07/23  2310   TOTAL PROTEIN 7.1 7.4 7.7   ALBUMIN 4.0 4.6 4.7   GLOBULIN 3.1 2.8 3.0   ALT (SGPT) 18 25 25   AST (SGOT) 54* 88* 73*   BILIRUBIN 0.9 0.7 0.4   ALK PHOS 79 87 90   LIPASE  --   --  22         Lab 11/10/23  0009 11/09/23  0636 11/09/23 0424 11/08/23  0123 11/07/23  2310   PROBNP  --   --   --  1,202.0*  --    HSTROP T  --  830* 783* 454* 429*   PROTIME 13.0 13.4  --   --   --    INR 0.97 0.97  --   --   --          Lab 11/08/23 0341   CHOLESTEROL 260*   LDL CHOL 171*   HDL CHOL 51   TRIGLYCERIDES 207*         Lab 11/10/23  0109 11/10/23  0009   ABO TYPING B B   RH TYPING Positive Positive    ANTIBODY SCREEN  --  Negative         Brief Urine Lab Results  (Last result in the past 365 days)        Color   Clarity   Blood   Leuk Est   Nitrite   Protein   CREAT   Urine HCG        11/09/23 0628 Yellow   Clear   Moderate (2+)   Negative   Negative   30 mg/dL (1+)                   Microbiology Results Abnormal       None            Carotid Duplex - Bilateral    Result Date: 11/10/2023    Right internal carotid artery demonstrates normal flow without evidence of hemodynamically significant stenosis.   Left internal carotid artery demonstrates a less than 50% stenosis.     XR Chest 1 View    Result Date: 11/10/2023  XR CHEST 1 VW Date of Exam: 11/10/2023 1:17 AM CST Indication: preop preop Comparison: Chest x-ray 11/7/2023 Findings: The heart is stable in size. No evidence for pneumothorax or pleural effusion. No focal infiltrate. Cervical fusion is present.     Impression: Impression: No acute cardiopulmonary process. Electronically Signed: Megan Parada MD  11/10/2023 6:25 AM CST  Workstation ID: [id number]    Adult Transthoracic Echo Complete w/ Color, Spectral and Contrast if necessary per protocol    Result Date: 11/9/2023    Left ventricular systolic function is normal. Calculated left ventricular EF = 61% Left ventricular ejection fraction appears to be 56 - 60%.   Left ventricular diastolic function is consistent with (grade I) impaired relaxation.   Mild dilation of the aortic root is present.      Results for orders placed during the hospital encounter of 11/07/23    Adult Transthoracic Echo Complete w/ Color, Spectral and Contrast if necessary per protocol    Interpretation Summary    Left ventricular systolic function is normal. Calculated left ventricular EF = 61% Left ventricular ejection fraction appears to be 56 - 60%.    Left ventricular diastolic function is consistent with (grade I) impaired relaxation.    Mild dilation of the aortic root is present.      Current medications:  Scheduled  Meds:aspirin, 81 mg, Oral, Daily  atorvastatin, 40 mg, Oral, Nightly  [START ON 11/13/2023] ceFAZolin, 2,000 mg, Intravenous, On Call to OR  [START ON 11/12/2023] chlorhexidine, 15 mL, Mouth/Throat, Q12H  cholecalciferol, 50,000 Units, Oral, Q7 Days  hydroCHLOROthiazide, 25 mg, Oral, Daily  labetalol, 400 mg, Oral, BID  lansoprazole, 30 mg, Oral, Q AM  lisinopril, 40 mg, Oral, Daily  [START ON 11/12/2023] mupirocin, 1 application , Each Nare, Q12H  nicotine, 1 patch, Transdermal, Q24H  [START ON 11/12/2023] pharmacy consult - MTM, , Does not apply, Daily  sertraline, 150 mg, Oral, Daily  sodium chloride, 10 mL, Intravenous, Q12H  sucralfate, 1 g, Oral, 4x Daily      Continuous Infusions:heparin, 22 Units/kg/hr, Last Rate: 22 Units/kg/hr (11/11/23 0801)  niCARdipine, 5-15 mg/hr, Last Rate: Stopped (11/10/23 0120)  Pharmacy to Dose Heparin,       PRN Meds:.  acetaminophen    albuterol    benzonatate    Chlorhexidine Gluconate Cloth    HYDROcodone-acetaminophen    ipratropium-albuterol    Morphine    Pharmacy to Dose Heparin    promethazine    sodium chloride    sodium chloride    tiZANidine    Assessment & Plan   Assessment & Plan     Active Hospital Problems    Diagnosis  POA    **Coronary artery disease [I25.10]  Yes    Sleep apnea [G47.30]  Unknown    Essential hypertension [I10]  Yes      Resolved Hospital Problems   No resolved problems to display.        Ronaldo Spain is a 53 y.o. male with PMH of CAD, HTN, lupus, sleep apnea, crohn's dx, cervical spine injury. Patient was transferred from [facility] for evaluation of CAD. He was found to have EKG changes, increased troponin and had heart cath which showed multivessel CAD. He was admitted by CTS and we were asked to consult.      Multivessel CAD  -- care per CTS plan for CABG on 11/13  -- Continue ASA, statin  -- Continue heparin drip      HTN  -- cont HCTZ, labetalol, lisinopril     Sleep apnea      Right foot pain from previous fall   -- xray  showed subluxation of the talonavicular joint   -- MRI showed medial meniscus thought to represent radial tear   -- needs repair at some point     Lupus    Expected Discharge Location and Transportation: home vs rehab, needs eval following surgery   Expected Discharge   Expected Discharge Date: 11/17/2023; Expected Discharge Time:      DVT prophylaxis:  Medical and mechanical DVT prophylaxis orders are present.     AM-PAC 6 Clicks Score (PT): 22 (11/10/23 2000)    CODE STATUS:   There are no questions and answers to display.     This patient's problems and plans were partially entered by my partner and updated as appropriate by me 11/11/23. Today is my first day evaluating this patient's active medical problems. I Personally reviewed chart and adjusted note to reflect daily changes in management/clinical condition. Copied text in this note has been reviewed and is accurate as of  11/11/23      Danelle Stone MD  11/11/23

## 2023-11-11 NOTE — PLAN OF CARE
Problem: Adult Inpatient Plan of Care  Goal: Plan of Care Review  Outcome: Ongoing, Progressing  Flowsheets  Taken 11/11/2023 0921 by Kaia Blanchard MS CCC-SLP  Progress: no change  Taken 11/10/2023 1626 by Willa Chavez MS CCC-SLP  Plan of Care Reviewed With:   patient   spouse   Goal Outcome Evaluation:           Progress: no change       SLP caregiver instruction completed. Will continue to address dysphagia - will hold swallow study until after surgery. Pt is tolerating current diet and only having intermittent difficulty with solids (1-2 x a week per his rpt). Pt likely will need post-op swallow eval anyway, so we will check back after surgery. Can cont current diet at tolerated until made NPO for surgery. Please see note for further details and recommendations.

## 2023-11-12 NOTE — PROGRESS NOTES
HEPARIN INFUSION    Ronaldo Spain is a 53 y.o. male receiving heparin infusion.    Therapy for (VTE/Cardiac): Cardiac  Patient Weight: 94.5 kg  Initial Bolus (Y/N): No (transferred from OSH on heparin drip)  Any Bolus (Y/N): Yes    Signs or Symptoms of Bleeding: none noted    Cardiac or Other (Not VTE)  Initial Bolus: 60 units/kg (Max 4,000 units)  Initial rate: 12 units/kg/hr (Max 1,000 units/hr)   Anti Xa Rebolus Infusion Hold time Change infusion Dose (Units/kg/hr) Next Anti Xa or aPTT Level Due   < 0.11 50 Units/kg  (4000 Units Max) None Increase by  3 Units/kg/hr 6 hours   0.11- 0.19 25 Units/kg  (2000 Units Max) None Increase by  2 Units/kg/hr 6 hours   0.2 - 0.29 0 None Increase by  1 Units/kg/hr 6 hours   0.3 - 0.5 0 None No Change 6 hours (after 2 consecutive levels in range check qAM)   0.51 - 0.6 0 None Decrease by  1 Units/kg/hr 6 hours   0.61 - 0.8 0 30 Minutes Decrease by  2 Units/kg/hr 6 hours   0.81 - 1 0 60 Minutes Decrease by  3 Units/kg/hr 6 hours   >1 0 Hold  After Anti Xa less than 0.5 decrease previous rate by  4 Units/kg/hr  Every 2 hours until Anti Xa  less than 0.5 then when infusion restarts in 6 hours     Results from last 7 days   Lab Units 11/10/23  0816 11/10/23  0009 11/09/23  0636 11/09/23  0052 11/08/23  0341   INR   --  0.97 0.97  --   --    HEMOGLOBIN g/dL 16.5  --   --  17.2 18.5*   HEMATOCRIT % 51.1*  --   --  52.3* 57.0*   PLATELETS 10*3/mm3 177  --   --  154 196        Date   Time   Anti-Xa Current Rate (Unit/kg/hr) Bolus   (Units) Rate Change   (Unit/kg/hr) New Rate (Unit/kg/hr) Next   Anti-Xa Comments  Pump Check Daily   11/9 2345 STAT 13 -- 0.5 13.5 0600 D/w RN, was on from OSH 13 units/kg/hr based on 100kg.  Adjusted to same rate with new weight of 94.5 kg   11/10 0200 0.1 13.5 +4000 +3 16.5 0800 D/w RN   11/10 0816 0.21 16.5 -- +1.5 18 1600 D/w RN   11/10 1536 0.17 18 +2000 +2 20 2300 D/w RN - no s/sx of bleeding or changes   11/10 2239 0.11 20 +2000 +2 22 0700 D/w RN    11/11 0913 0.13 22 +2000 +2 24 1800 D/w RN Kristina, pump verified   11/11 1741 0.26 24 -- +1 25 0200 D/w RN   11/12 0213 0.29 25 -- +1 26 1200 D/w RN   11/12 1327 0.45 26 -- -- 26 -- D/w MELA Acevedo, pump verified. Heparin to turn off at 0600 per CTS, d/w Ehsan Lindsay, PharmD, BCPS  11/12/2023  14:58 EST

## 2023-11-12 NOTE — PROGRESS NOTES
Cardiothoracic Surgery Progress Note      CC: CAD     LOS: 3 days      Subjective:  Resting comfortably in his room  Denies chest pain this morning    Objective:  Vital Signs  Temp:  [97 °F (36.1 °C)-98.1 °F (36.7 °C)] 97.1 °F (36.2 °C)  Heart Rate:  [67-77] 77  Resp:  [18-19] 18  BP: ()/(66-90) 129/81    Physical Exam:   General Appearance: alert, appears stated age and cooperative   Lungs: clear to auscultation, respirations regular, respirations even, and respirations unlabored   Heart: regular rhythm & normal rate, normal S1, S2, no murmur, no gallop, no rub, and no click   Skin: warm, well perfused      Results:    Results from last 7 days   Lab Units 11/10/23  0816   WBC 10*3/mm3 13.43*   HEMOGLOBIN g/dL 16.5   HEMATOCRIT % 51.1*   PLATELETS 10*3/mm3 177     Results from last 7 days   Lab Units 11/10/23  0926   SODIUM mmol/L 136   POTASSIUM mmol/L 3.9   CHLORIDE mmol/L 99   CO2 mmol/L 27.0   BUN mg/dL 20   CREATININE mg/dL 1.01   GLUCOSE mg/dL 118*   CALCIUM mg/dL 9.0       Assessment:  CAD    Plan:  Continue preoperative workup  Stop heparin Gtt at 0001  NPO after midnight  Plan for cabg with Dr Hameed tomorrow  Trend p2y12    Chelsea Gillespie PA-C  11/12/23  08:06 EST

## 2023-11-12 NOTE — PLAN OF CARE
Problem: Adult Inpatient Plan of Care  Goal: Plan of Care Review  Outcome: Ongoing, Progressing  Goal: Patient-Specific Goal (Individualized)  Outcome: Ongoing, Progressing  Goal: Absence of Hospital-Acquired Illness or Injury  Outcome: Ongoing, Progressing  Intervention: Identify and Manage Fall Risk  Recent Flowsheet Documentation  Taken 11/12/2023 0600 by Noris Mohan RN  Safety Promotion/Fall Prevention:   assistive device/personal items within reach   clutter free environment maintained   fall prevention program maintained   nonskid shoes/slippers when out of bed   room organization consistent   safety round/check completed  Taken 11/12/2023 0400 by Noris Mohan RN  Safety Promotion/Fall Prevention:   assistive device/personal items within reach   clutter free environment maintained   fall prevention program maintained   nonskid shoes/slippers when out of bed   room organization consistent   safety round/check completed  Taken 11/12/2023 0200 by Noris Mohan RN  Safety Promotion/Fall Prevention:   assistive device/personal items within reach   clutter free environment maintained   fall prevention program maintained   nonskid shoes/slippers when out of bed   room organization consistent   safety round/check completed  Taken 11/12/2023 0000 by Noris Mohan RN  Safety Promotion/Fall Prevention:   assistive device/personal items within reach   clutter free environment maintained   fall prevention program maintained   nonskid shoes/slippers when out of bed   room organization consistent   safety round/check completed  Taken 11/11/2023 2200 by Noris Mohan RN  Safety Promotion/Fall Prevention:   assistive device/personal items within reach   clutter free environment maintained   fall prevention program maintained   nonskid shoes/slippers when out of bed   room organization consistent   safety round/check completed  Taken 11/11/2023 2000 by Noris Mohan RN  Safety Promotion/Fall  Prevention:   assistive device/personal items within reach   clutter free environment maintained   fall prevention program maintained   nonskid shoes/slippers when out of bed   room organization consistent   safety round/check completed  Intervention: Prevent Skin Injury  Recent Flowsheet Documentation  Taken 11/12/2023 0600 by Noris Mohan RN  Body Position:   position changed independently   weight shifting  Skin Protection:   tubing/devices free from skin contact   transparent dressing maintained   skin-to-device areas padded  Taken 11/12/2023 0400 by Noris Mohan RN  Body Position:   position changed independently   weight shifting  Skin Protection:   tubing/devices free from skin contact   transparent dressing maintained   skin-to-device areas padded  Taken 11/12/2023 0200 by Noris Mohan RN  Body Position:   position changed independently   weight shifting  Skin Protection:   tubing/devices free from skin contact   transparent dressing maintained   skin-to-device areas padded  Taken 11/12/2023 0000 by Noris Mohan RN  Body Position:   position changed independently   weight shifting  Skin Protection:   tubing/devices free from skin contact   transparent dressing maintained   skin-to-device areas padded  Taken 11/11/2023 2200 by Noris Mohan RN  Body Position:   position changed independently   weight shifting  Skin Protection:   tubing/devices free from skin contact   transparent dressing maintained   skin-to-device areas padded  Taken 11/11/2023 2000 by Noris Mohan RN  Body Position:   position changed independently   weight shifting  Skin Protection:   tubing/devices free from skin contact   transparent dressing maintained   skin-to-device areas padded  Intervention: Prevent and Manage VTE (Venous Thromboembolism) Risk  Recent Flowsheet Documentation  Taken 11/12/2023 0600 by Noris Mohan RN  Activity Management: activity encouraged  Taken 11/12/2023 0400 by Noris Mohan  C, RN  Activity Management:   activity encouraged   up ad haseeb  Taken 11/12/2023 0200 by Noris Mohan RN  Activity Management: activity encouraged  Taken 11/12/2023 0000 by Noris Mohan RN  Activity Management:   activity encouraged   up ad haseeb  Taken 11/11/2023 2200 by Noris Mohan RN  Activity Management:   activity encouraged   up ad haseeb  Taken 11/11/2023 2000 by Noris Mohan RN  Activity Management:   activity encouraged   up ad haseeb  Intervention: Prevent Infection  Recent Flowsheet Documentation  Taken 11/12/2023 0600 by Noris Mohan RN  Infection Prevention:   environmental surveillance performed   hand hygiene promoted   personal protective equipment utilized   single patient room provided  Taken 11/12/2023 0400 by Noris Mohan RN  Infection Prevention:   environmental surveillance performed   hand hygiene promoted   personal protective equipment utilized   single patient room provided  Taken 11/12/2023 0200 by Noris Mohan RN  Infection Prevention:   environmental surveillance performed   hand hygiene promoted   personal protective equipment utilized   single patient room provided  Taken 11/12/2023 0000 by Noris Mohan RN  Infection Prevention:   environmental surveillance performed   hand hygiene promoted   personal protective equipment utilized   single patient room provided  Taken 11/11/2023 2200 by Noris Mohan RN  Infection Prevention:   environmental surveillance performed   hand hygiene promoted   personal protective equipment utilized   single patient room provided  Taken 11/11/2023 2000 by Noris Mohan RN  Infection Prevention:   environmental surveillance performed   hand hygiene promoted   personal protective equipment utilized   single patient room provided  Goal: Optimal Comfort and Wellbeing  Outcome: Ongoing, Progressing  Intervention: Monitor Pain and Promote Comfort  Recent Flowsheet Documentation  Taken 11/11/2023 2200 by Noris Mohan  RN  Pain Management Interventions: see MAR  Taken 11/11/2023 2000 by Noris Mohan RN  Pain Management Interventions:   pain management plan reviewed with patient/caregiver   see MAR  Intervention: Provide Person-Centered Care  Recent Flowsheet Documentation  Taken 11/11/2023 2000 by Noris Mohan RN  Trust Relationship/Rapport:   care explained   choices provided   emotional support provided   empathic listening provided  Goal: Readiness for Transition of Care  Outcome: Ongoing, Progressing     Problem: Asthma Comorbidity  Goal: Maintenance of Asthma Control  Outcome: Ongoing, Progressing  Intervention: Maintain Asthma Symptom Control  Recent Flowsheet Documentation  Taken 11/12/2023 0600 by Noris Mohan RN  Medication Review/Management: medications reviewed  Taken 11/12/2023 0400 by Noris Mohan RN  Medication Review/Management: medications reviewed  Taken 11/12/2023 0200 by Noris Mohan RN  Medication Review/Management: medications reviewed  Taken 11/11/2023 2200 by Noris Mohan RN  Medication Review/Management: medications reviewed  Taken 11/11/2023 2000 by Noris Mohan RN  Medication Review/Management: medications reviewed     Problem: Hypertension Comorbidity  Goal: Blood Pressure in Desired Range  Outcome: Ongoing, Progressing  Intervention: Maintain Blood Pressure Management  Recent Flowsheet Documentation  Taken 11/12/2023 0600 by Noris Mohan RN  Medication Review/Management: medications reviewed  Taken 11/12/2023 0400 by Noris Mohan RN  Medication Review/Management: medications reviewed  Taken 11/12/2023 0200 by Noris Mohan RN  Medication Review/Management: medications reviewed  Taken 11/11/2023 2200 by Noris Mohan RN  Medication Review/Management: medications reviewed  Taken 11/11/2023 2000 by Noris Mohan RN  Medication Review/Management: medications reviewed     Problem: Obstructive Sleep Apnea Risk or Actual Comorbidity Management  Goal:  Unobstructed Breathing During Sleep  Outcome: Ongoing, Progressing     Problem: Pain Chronic (Persistent) (Comorbidity Management)  Goal: Acceptable Pain Control and Functional Ability  Outcome: Ongoing, Progressing  Intervention: Manage Persistent Pain  Recent Flowsheet Documentation  Taken 11/12/2023 0600 by Noris Mohan RN  Medication Review/Management: medications reviewed  Taken 11/12/2023 0400 by Noris Mohan RN  Medication Review/Management: medications reviewed  Taken 11/12/2023 0200 by Noris Mohan RN  Medication Review/Management: medications reviewed  Taken 11/11/2023 2200 by Noris Mohan RN  Medication Review/Management: medications reviewed  Taken 11/11/2023 2000 by Noris Mohan RN  Medication Review/Management: medications reviewed  Intervention: Develop Pain Management Plan  Recent Flowsheet Documentation  Taken 11/11/2023 2200 by Noris Mohan RN  Pain Management Interventions: see MAR  Taken 11/11/2023 2000 by Noris Mohan RN  Pain Management Interventions:   pain management plan reviewed with patient/caregiver   see MAR  Intervention: Optimize Psychosocial Wellbeing  Recent Flowsheet Documentation  Taken 11/12/2023 0600 by Noris Mohan RN  Diversional Activities:   television   smartphone  Family/Support System Care: support provided  Taken 11/12/2023 0400 by Noris Mohan RN  Diversional Activities:   television   smartphone  Family/Support System Care: support provided  Taken 11/12/2023 0200 by Noris Mohan RN  Diversional Activities:   television   smartphone  Family/Support System Care: support provided  Taken 11/12/2023 0000 by Noris Mohan RN  Diversional Activities:   television   smartphone  Family/Support System Care: support provided  Taken 11/11/2023 2200 by Noris Mohan RN  Diversional Activities:   television   smartphone  Family/Support System Care: support provided  Taken 11/11/2023 2000 by Noris Mohan RN  Diversional  Activities:   television   [employer]  Family/Support System Care: support provided     Problem: Fall Injury Risk  Goal: Absence of Fall and Fall-Related Injury  Outcome: Ongoing, Progressing  Intervention: Identify and Manage Contributors  Recent Flowsheet Documentation  Taken 11/12/2023 0600 by Noris Mohan RN  Medication Review/Management: medications reviewed  Taken 11/12/2023 0400 by Noris Mohan RN  Medication Review/Management: medications reviewed  Taken 11/12/2023 0200 by Noris Mohan RN  Medication Review/Management: medications reviewed  Taken 11/11/2023 2200 by Noris Mohan RN  Medication Review/Management: medications reviewed  Taken 11/11/2023 2000 by Noris Mohan RN  Medication Review/Management: medications reviewed  Intervention: Promote Injury-Free Environment  Recent Flowsheet Documentation  Taken 11/12/2023 0600 by Noris Mohan RN  Safety Promotion/Fall Prevention:   assistive device/personal items within reach   clutter free environment maintained   fall prevention program maintained   nonskid shoes/slippers when out of bed   room organization consistent   safety round/check completed  Taken 11/12/2023 0400 by Noris Mohan RN  Safety Promotion/Fall Prevention:   assistive device/personal items within reach   clutter free environment maintained   fall prevention program maintained   nonskid shoes/slippers when out of bed   room organization consistent   safety round/check completed  Taken 11/12/2023 0200 by Noris Mohan RN  Safety Promotion/Fall Prevention:   assistive device/personal items within reach   clutter free environment maintained   fall prevention program maintained   nonskid shoes/slippers when out of bed   room organization consistent   safety round/check completed  Taken 11/12/2023 0000 by Noris Mohan RN  Safety Promotion/Fall Prevention:   assistive device/personal items within reach   clutter free environment maintained   fall  prevention program maintained   nonskid shoes/slippers when out of bed   room organization consistent   safety round/check completed  Taken 11/11/2023 2200 by Noris Mohan, RN  Safety Promotion/Fall Prevention:   assistive device/personal items within reach   clutter free environment maintained   fall prevention program maintained   nonskid shoes/slippers when out of bed   room organization consistent   safety round/check completed  Taken 11/11/2023 2000 by Noris Mohan, RN  Safety Promotion/Fall Prevention:   assistive device/personal items within reach   clutter free environment maintained   fall prevention program maintained   nonskid shoes/slippers when out of bed   room organization consistent   safety round/check completed   Goal Outcome Evaluation:

## 2023-11-12 NOTE — PLAN OF CARE
Pt with heparin drip, to be stopped at 0600 tomorrow in preparation for surgery.   Pt c/o feeling as though he is having a chron's flare-up, worried that he will start bleeding. Notified MD.          Problem: Adult Inpatient Plan of Care  Goal: Plan of Care Review  Outcome: Ongoing, Progressing  Goal: Patient-Specific Goal (Individualized)  Outcome: Ongoing, Progressing  Goal: Absence of Hospital-Acquired Illness or Injury  Outcome: Ongoing, Progressing  Intervention: Identify and Manage Fall Risk  Recent Flowsheet Documentation  Taken 11/12/2023 1600 by Danisha Mcfadden RN  Safety Promotion/Fall Prevention:   activity supervised   assistive device/personal items within reach   clutter free environment maintained   safety round/check completed   room organization consistent  Taken 11/12/2023 1400 by Danisha Mcfadden RN  Safety Promotion/Fall Prevention:   assistive device/personal items within reach   clutter free environment maintained   room organization consistent   safety round/check completed  Taken 11/12/2023 1200 by Danisha Mcfadden RN  Safety Promotion/Fall Prevention:   assistive device/personal items within reach   clutter free environment maintained   room organization consistent   safety round/check completed  Taken 11/12/2023 1000 by Danisha Mcfadden RN  Safety Promotion/Fall Prevention:   assistive device/personal items within reach   clutter free environment maintained   room organization consistent   safety round/check completed  Taken 11/12/2023 0800 by Danisha Mcfadden RN  Safety Promotion/Fall Prevention:   assistive device/personal items within reach   clutter free environment maintained   room organization consistent   safety round/check completed  Intervention: Prevent Skin Injury  Recent Flowsheet Documentation  Taken 11/12/2023 1600 by Danisha Mcfadden RN  Body Position: position changed independently  Skin Protection:   adhesive use limited   tubing/devices free from skin contact    transparent dressing maintained   skin-to-skin areas padded   skin-to-device areas padded  Taken 11/12/2023 1400 by Danisha Mcfadden RN  Body Position: position changed independently  Skin Protection:   adhesive use limited   tubing/devices free from skin contact   transparent dressing maintained   skin-to-skin areas padded   skin-to-device areas padded  Taken 11/12/2023 1200 by Danisha Mcfadden RN  Body Position: position changed independently  Skin Protection:   adhesive use limited   tubing/devices free from skin contact   transparent dressing maintained   skin-to-device areas padded   skin-to-skin areas padded  Taken 11/12/2023 1000 by Danisha Mcfadden RN  Body Position: position changed independently  Skin Protection:   adhesive use limited   transparent dressing maintained   tubing/devices free from skin contact   skin-to-skin areas padded   skin-to-device areas padded  Taken 11/12/2023 0800 by Danisha Mcfadden RN  Body Position: position changed independently  Skin Protection:   adhesive use limited   tubing/devices free from skin contact   transparent dressing maintained   skin-to-skin areas padded   skin-to-device areas padded  Intervention: Prevent and Manage VTE (Venous Thromboembolism) Risk  Recent Flowsheet Documentation  Taken 11/12/2023 1600 by Danisha Mcfadden RN  Activity Management: activity encouraged  Taken 11/12/2023 1400 by Danisha Mcfadden RN  Activity Management: activity encouraged  Taken 11/12/2023 1200 by Danisha Mcfadden RN  Activity Management: activity encouraged  Taken 11/12/2023 1000 by Danisha Mcfadden RN  Activity Management: activity encouraged  Taken 11/12/2023 0800 by Danisha Mcfadden RN  Activity Management: activity encouraged  VTE Prevention/Management: (heparin drip) --  Range of Motion: active ROM (range of motion) encouraged  Intervention: Prevent Infection  Recent Flowsheet Documentation  Taken 11/12/2023 1600 by Danisha Mcfadden RN  Infection Prevention:    personal protective equipment utilized   hand hygiene promoted  Taken 11/12/2023 1400 by Danisha Mcfadden RN  Infection Prevention:   hand hygiene promoted   personal protective equipment utilized  Taken 11/12/2023 1200 by Danisha Mcfadden RN  Infection Prevention:   hand hygiene promoted   personal protective equipment utilized  Taken 11/12/2023 1000 by Danisha Mcfadden RN  Infection Prevention:   hand hygiene promoted   personal protective equipment utilized  Taken 11/12/2023 0800 by Danisha Mcfadden RN  Infection Prevention:   personal protective equipment utilized   hand hygiene promoted  Goal: Optimal Comfort and Wellbeing  Outcome: Ongoing, Progressing  Intervention: Monitor Pain and Promote Comfort  Recent Flowsheet Documentation  Taken 11/12/2023 1532 by Danisha Mcfadden RN  Pain Management Interventions: see MAR  Taken 11/12/2023 0940 by Danisha Mcfadden RN  Pain Management Interventions: quiet environment facilitated  Taken 11/12/2023 0913 by Danisha Mcfadden RN  Pain Management Interventions:   see MAR   quiet environment facilitated  Intervention: Provide Person-Centered Care  Recent Flowsheet Documentation  Taken 11/12/2023 0800 by aDnisha Mcfadden RN  Trust Relationship/Rapport:   care explained   choices provided   emotional support provided   empathic listening provided   questions answered   questions encouraged  Goal: Readiness for Transition of Care  Outcome: Ongoing, Progressing     Problem: Asthma Comorbidity  Goal: Maintenance of Asthma Control  Outcome: Ongoing, Progressing  Intervention: Maintain Asthma Symptom Control  Recent Flowsheet Documentation  Taken 11/12/2023 1600 by Danisha Mcfadden RN  Medication Review/Management: medications reviewed  Taken 11/12/2023 1400 by Danisha Mcfadden RN  Medication Review/Management: medications reviewed  Taken 11/12/2023 1200 by Danisha Mcfadden RN  Medication Review/Management: medications reviewed  Taken 11/12/2023 1000 by Danisha Mcfadden  RN  Medication Review/Management: medications reviewed  Taken 11/12/2023 0800 by Danisha Mcfadden RN  Medication Review/Management: medications reviewed     Problem: Hypertension Comorbidity  Goal: Blood Pressure in Desired Range  Outcome: Ongoing, Progressing  Intervention: Maintain Blood Pressure Management  Recent Flowsheet Documentation  Taken 11/12/2023 1600 by Danisha Mcfadden RN  Medication Review/Management: medications reviewed  Taken 11/12/2023 1400 by Danisha Mcfadden RN  Medication Review/Management: medications reviewed  Taken 11/12/2023 1200 by Danisha Mcfadden RN  Medication Review/Management: medications reviewed  Taken 11/12/2023 1000 by Danisha Mcfadden RN  Medication Review/Management: medications reviewed  Taken 11/12/2023 0800 by Danisha Mcfadden RN  Medication Review/Management: medications reviewed     Problem: Obstructive Sleep Apnea Risk or Actual Comorbidity Management  Goal: Unobstructed Breathing During Sleep  Outcome: Ongoing, Progressing     Problem: Pain Chronic (Persistent) (Comorbidity Management)  Goal: Acceptable Pain Control and Functional Ability  Outcome: Ongoing, Progressing  Intervention: Manage Persistent Pain  Recent Flowsheet Documentation  Taken 11/12/2023 1600 by Danisha Mcfadden RN  Medication Review/Management: medications reviewed  Taken 11/12/2023 1400 by Danisha Mcfadden RN  Medication Review/Management: medications reviewed  Taken 11/12/2023 1200 by Danisha Mcfadden RN  Medication Review/Management: medications reviewed  Taken 11/12/2023 1000 by Danisha Mcfadden RN  Medication Review/Management: medications reviewed  Taken 11/12/2023 0800 by Danisha Mcfadden RN  Medication Review/Management: medications reviewed  Intervention: Develop Pain Management Plan  Recent Flowsheet Documentation  Taken 11/12/2023 1532 by Danisha Mcfadden RN  Pain Management Interventions: see MAR  Taken 11/12/2023 0940 by Danisha Mcfadden RN  Pain Management Interventions: quiet  environment facilitated  Taken 11/12/2023 0913 by Danisha Mcfadden RN  Pain Management Interventions:   see MAR   quiet environment facilitated  Intervention: Optimize Psychosocial Wellbeing  Recent Flowsheet Documentation  Taken 11/12/2023 0800 by Danisha Mcfadden RN  Diversional Activities: television  Family/Support System Care: support provided     Problem: Fall Injury Risk  Goal: Absence of Fall and Fall-Related Injury  Outcome: Ongoing, Progressing  Intervention: Identify and Manage Contributors  Recent Flowsheet Documentation  Taken 11/12/2023 1600 by Danisha Mcfadden RN  Medication Review/Management: medications reviewed  Taken 11/12/2023 1400 by Danisha Mcfadden RN  Medication Review/Management: medications reviewed  Taken 11/12/2023 1200 by Danisha Mcfadden RN  Medication Review/Management: medications reviewed  Taken 11/12/2023 1000 by Danisha Mcfadden RN  Medication Review/Management: medications reviewed  Taken 11/12/2023 0800 by Danisha Mcfadden RN  Medication Review/Management: medications reviewed  Intervention: Promote Injury-Free Environment  Recent Flowsheet Documentation  Taken 11/12/2023 1600 by Danisha Mcfadden RN  Safety Promotion/Fall Prevention:   activity supervised   assistive device/personal items within reach   clutter free environment maintained   safety round/check completed   room organization consistent  Taken 11/12/2023 1400 by Danisha Mcfadden RN  Safety Promotion/Fall Prevention:   assistive device/personal items within reach   clutter free environment maintained   room organization consistent   safety round/check completed  Taken 11/12/2023 1200 by Danisha Mcfadden RN  Safety Promotion/Fall Prevention:   assistive device/personal items within reach   clutter free environment maintained   room organization consistent   safety round/check completed  Taken 11/12/2023 1000 by Danisha Mcfadden RN  Safety Promotion/Fall Prevention:   assistive device/personal items within  reach   clutter free environment maintained   room organization consistent   safety round/check completed  Taken 11/12/2023 0800 by Danisha Mcfadden RN  Safety Promotion/Fall Prevention:   assistive device/personal items within reach   clutter free environment maintained   room organization consistent   safety round/check completed   Goal Outcome Evaluation:

## 2023-11-12 NOTE — PROGRESS NOTES
[facility] Medicine Services  CLINICAL REVIEW NOTE    Patient Name: Ronaldo Spain  : 1970  MRN: [id number]    Date of Admission: 2023  Length of Stay: 3  Attending Service: CTS        Patient's labs, charting, and events reviewed.  No active medical management issues to address today, the Hospitalist team will continue to follow daily, be available for any needs, and see or bill for services as needed.    Ronaldo Spain is a 53 y.o. male with PMH of CAD, HTN, lupus, sleep apnea, crohn's dx, cervical spine injury. Patient was transferred from [facility] for evaluation of CAD. He was found to have EKG changes, increased troponin and had heart cath which showed multivessel CAD. He was admitted by CTS and we were asked to consult.       Multivessel CAD  -- care per CTS plan for CABG on   -- Continue ASA, statin  -- Continue heparin drip      HTN  -- cont HCTZ, labetalol, lisinopril  - Pressures controlled in 110s-120s     Sleep apnea      Right foot pain from previous fall   -- xray showed subluxation of the talonavicular joint   -- MRI showed medial meniscus thought to represent radial tear   -- needs repair at some point     Lupus

## 2023-11-12 NOTE — PLAN OF CARE
Problem: Adult Inpatient Plan of Care  Goal: Plan of Care Review  Outcome: Ongoing, Progressing  Goal: Patient-Specific Goal (Individualized)  Outcome: Ongoing, Progressing  Goal: Absence of Hospital-Acquired Illness or Injury  Outcome: Ongoing, Progressing  Intervention: Identify and Manage Fall Risk  Recent Flowsheet Documentation  Taken 11/11/2023 1800 by Danisha Mcfadden RN  Safety Promotion/Fall Prevention:   activity supervised   assistive device/personal items within reach   clutter free environment maintained   room organization consistent   safety round/check completed  Taken 11/11/2023 1600 by Danisha Mcfadden RN  Safety Promotion/Fall Prevention:   activity supervised   assistive device/personal items within reach   clutter free environment maintained   safety round/check completed   room organization consistent  Taken 11/11/2023 1400 by Danisha Mcfadden RN  Safety Promotion/Fall Prevention:   activity supervised   assistive device/personal items within reach   clutter free environment maintained   room organization consistent   safety round/check completed  Taken 11/11/2023 1200 by Danisha Mcfadden RN  Safety Promotion/Fall Prevention:   activity supervised   assistive device/personal items within reach   clutter free environment maintained   room organization consistent   safety round/check completed  Taken 11/11/2023 1000 by Danisha Mcfadden RN  Safety Promotion/Fall Prevention:   activity supervised   assistive device/personal items within reach   clutter free environment maintained   room organization consistent   safety round/check completed  Taken 11/11/2023 0800 by Danisha Mcfadden RN  Safety Promotion/Fall Prevention:   activity supervised   assistive device/personal items within reach   clutter free environment maintained   safety round/check completed   room organization consistent  Intervention: Prevent Skin Injury  Recent Flowsheet Documentation  Taken 11/11/2023 1800 by Bogdan  MELA Raman  Body Position: position changed independently  Skin Protection:   adhesive use limited   tubing/devices free from skin contact   transparent dressing maintained   skin-to-skin areas padded   skin-to-device areas padded  Taken 11/11/2023 1600 by Danisha Mcfadden RN  Body Position: position changed independently  Skin Protection:   adhesive use limited   tubing/devices free from skin contact   transparent dressing maintained   skin-to-skin areas padded   skin-to-device areas padded  Taken 11/11/2023 1400 by Danisha Mcfadden RN  Body Position: position changed independently  Skin Protection:   adhesive use limited   tubing/devices free from skin contact   transparent dressing maintained   skin-to-skin areas padded   skin-to-device areas padded  Taken 11/11/2023 1200 by Danisha Mcfadden RN  Body Position: position changed independently  Skin Protection:   adhesive use limited   tubing/devices free from skin contact   transparent dressing maintained   skin-to-skin areas padded   skin-to-device areas padded  Taken 11/11/2023 1000 by Danisha Mcfadden RN  Body Position: position changed independently  Skin Protection:   adhesive use limited   tubing/devices free from skin contact   transparent dressing maintained   skin-to-skin areas padded   skin-to-device areas padded  Taken 11/11/2023 0800 by Danisha Mcfadden RN  Body Position: position changed independently  Skin Protection:   adhesive use limited   tubing/devices free from skin contact   transparent dressing maintained   skin-to-skin areas padded   skin-to-device areas padded  Intervention: Prevent and Manage VTE (Venous Thromboembolism) Risk  Recent Flowsheet Documentation  Taken 11/11/2023 1800 by Danisha Mcfadden RN  Activity Management: activity encouraged  Taken 11/11/2023 1600 by Danisha Mcfadden RN  Activity Management: activity encouraged  Taken 11/11/2023 1400 by Danisha Mcfadden RN  Activity Management: activity encouraged  Taken  11/11/2023 1200 by Danisha Mcfadden RN  Activity Management: activity encouraged  Taken 11/11/2023 1000 by Danisha Mcfadden RN  Activity Management: activity encouraged  Taken 11/11/2023 0800 by Danisha Mcfadden RN  Activity Management: activity encouraged  VTE Prevention/Management: (heparin drip) --  Intervention: Prevent Infection  Recent Flowsheet Documentation  Taken 11/11/2023 1800 by Danisha Mcfadden RN  Infection Prevention:   hand hygiene promoted   personal protective equipment utilized  Taken 11/11/2023 1600 by Danisha Mcfadden RN  Infection Prevention:   personal protective equipment utilized   hand hygiene promoted  Taken 11/11/2023 1400 by Danisha Mcfadden RN  Infection Prevention:   personal protective equipment utilized   hand hygiene promoted  Taken 11/11/2023 1200 by Danisha Mcfadden RN  Infection Prevention:   personal protective equipment utilized   hand hygiene promoted  Taken 11/11/2023 1000 by Danisha Mcfadden RN  Infection Prevention:   hand hygiene promoted   personal protective equipment utilized  Taken 11/11/2023 0800 by Danisha Mcfadden RN  Infection Prevention:   hand hygiene promoted   personal protective equipment utilized  Goal: Optimal Comfort and Wellbeing  Outcome: Ongoing, Progressing  Intervention: Monitor Pain and Promote Comfort  Recent Flowsheet Documentation  Taken 11/11/2023 1600 by Danisha Mcfadden RN  Pain Management Interventions: quiet environment facilitated  Taken 11/11/2023 1500 by Danisha Mcfadden RN  Pain Management Interventions: see MAR  Intervention: Provide Person-Centered Care  Recent Flowsheet Documentation  Taken 11/11/2023 0800 by Danisha Mcfadden RN  Trust Relationship/Rapport:   care explained   choices provided   emotional support provided   empathic listening provided   questions answered   questions encouraged  Goal: Readiness for Transition of Care  Outcome: Ongoing, Progressing     Problem: Asthma Comorbidity  Goal: Maintenance of Asthma  Control  Outcome: Ongoing, Progressing  Intervention: Maintain Asthma Symptom Control  Recent Flowsheet Documentation  Taken 11/11/2023 1800 by Danisha Mcfadden RN  Medication Review/Management: medications reviewed  Taken 11/11/2023 1600 by Danisha Mcfadden RN  Medication Review/Management: medications reviewed  Taken 11/11/2023 1400 by Danisha Mcfadden RN  Medication Review/Management: medications reviewed  Taken 11/11/2023 1200 by Danisha Mcfadden RN  Medication Review/Management: medications reviewed  Taken 11/11/2023 1000 by Danisha Mcfadden RN  Medication Review/Management: medications reviewed  Taken 11/11/2023 0800 by Danisha Mcfadden RN  Medication Review/Management: medications reviewed     Problem: Hypertension Comorbidity  Goal: Blood Pressure in Desired Range  Outcome: Ongoing, Progressing  Intervention: Maintain Blood Pressure Management  Recent Flowsheet Documentation  Taken 11/11/2023 1800 by Danisha Mcfadden RN  Medication Review/Management: medications reviewed  Taken 11/11/2023 1600 by Danisha Mcfadden RN  Medication Review/Management: medications reviewed  Taken 11/11/2023 1400 by Danisha Mcfadden RN  Medication Review/Management: medications reviewed  Taken 11/11/2023 1200 by Danisha Mcfadden RN  Medication Review/Management: medications reviewed  Taken 11/11/2023 1000 by Danisha Mcfadden RN  Medication Review/Management: medications reviewed  Taken 11/11/2023 0800 by Danisha Mcfadden RN  Medication Review/Management: medications reviewed     Problem: Obstructive Sleep Apnea Risk or Actual Comorbidity Management  Goal: Unobstructed Breathing During Sleep  Outcome: Ongoing, Progressing     Problem: Pain Chronic (Persistent) (Comorbidity Management)  Goal: Acceptable Pain Control and Functional Ability  Outcome: Ongoing, Progressing  Intervention: Manage Persistent Pain  Recent Flowsheet Documentation  Taken 11/11/2023 1800 by aDnisha Mcfadden RN  Medication Review/Management:  medications reviewed  Taken 11/11/2023 1600 by Danisha Mcfadden RN  Medication Review/Management: medications reviewed  Taken 11/11/2023 1400 by Danisha Mcfadden RN  Medication Review/Management: medications reviewed  Taken 11/11/2023 1200 by Danisha Mcfadden RN  Medication Review/Management: medications reviewed  Taken 11/11/2023 1000 by Danisha Mcfadden RN  Medication Review/Management: medications reviewed  Taken 11/11/2023 0800 by Danisha Mcfadden RN  Medication Review/Management: medications reviewed  Intervention: Develop Pain Management Plan  Recent Flowsheet Documentation  Taken 11/11/2023 1600 by Danisha Mcfadden RN  Pain Management Interventions: quiet environment facilitated  Taken 11/11/2023 1500 by Danisha Mcfadden RN  Pain Management Interventions: see MAR  Intervention: Optimize Psychosocial Wellbeing  Recent Flowsheet Documentation  Taken 11/11/2023 0800 by Danisha Mcafdden RN  Diversional Activities:   television   smartphone  Family/Support System Care: support provided     Problem: Fall Injury Risk  Goal: Absence of Fall and Fall-Related Injury  Outcome: Ongoing, Progressing  Intervention: Identify and Manage Contributors  Recent Flowsheet Documentation  Taken 11/11/2023 1800 by Danisha Mcfadden RN  Medication Review/Management: medications reviewed  Taken 11/11/2023 1600 by Danisha Mcfadden RN  Medication Review/Management: medications reviewed  Taken 11/11/2023 1400 by Danisha Mcfadden RN  Medication Review/Management: medications reviewed  Taken 11/11/2023 1200 by Danisha Mcfadden RN  Medication Review/Management: medications reviewed  Taken 11/11/2023 1000 by Danisha Mcfadden RN  Medication Review/Management: medications reviewed  Taken 11/11/2023 0800 by Danisha Mcfadden RN  Medication Review/Management: medications reviewed  Intervention: Promote Injury-Free Environment  Recent Flowsheet Documentation  Taken 11/11/2023 1800 by Danisha Mcfadden RN  Safety Promotion/Fall  Prevention:   activity supervised   assistive device/personal items within reach   clutter free environment maintained   room organization consistent   safety round/check completed  Taken 11/11/2023 1600 by Danisha Mcfadden RN  Safety Promotion/Fall Prevention:   activity supervised   assistive device/personal items within reach   clutter free environment maintained   safety round/check completed   room organization consistent  Taken 11/11/2023 1400 by Danisha Mcfadden RN  Safety Promotion/Fall Prevention:   activity supervised   assistive device/personal items within reach   clutter free environment maintained   room organization consistent   safety round/check completed  Taken 11/11/2023 1200 by Danisha Mcfadden RN  Safety Promotion/Fall Prevention:   activity supervised   assistive device/personal items within reach   clutter free environment maintained   room organization consistent   safety round/check completed  Taken 11/11/2023 1000 by Danisha Mcfadden RN  Safety Promotion/Fall Prevention:   activity supervised   assistive device/personal items within reach   clutter free environment maintained   room organization consistent   safety round/check completed  Taken 11/11/2023 0800 by Danisha Mcfadden RN  Safety Promotion/Fall Prevention:   activity supervised   assistive device/personal items within reach   clutter free environment maintained   safety round/check completed   room organization consistent   Goal Outcome Evaluation:

## 2023-11-13 PROBLEM — I10 ESSENTIAL HYPERTENSION: Chronic | Status: ACTIVE | Noted: 2018-08-22

## 2023-11-13 PROBLEM — Z72.0 TOBACCO USE: Chronic | Status: ACTIVE | Noted: 2023-01-01

## 2023-11-13 PROBLEM — G47.30 SLEEP APNEA: Chronic | Status: ACTIVE | Noted: 2023-01-01

## 2023-11-13 PROBLEM — E66.9 CLASS 1 OBESITY IN ADULT: Status: ACTIVE | Noted: 2023-01-01

## 2023-11-13 PROBLEM — I25.10 CORONARY ARTERY DISEASE: Chronic | Status: ACTIVE | Noted: 2023-01-01

## 2023-11-13 PROBLEM — E66.9 CLASS 1 OBESITY IN ADULT: Chronic | Status: ACTIVE | Noted: 2023-01-01

## 2023-11-13 PROBLEM — K50.90 INFLAMMATORY BOWEL DISEASE (CROHN'S DISEASE): Status: ACTIVE | Noted: 2023-01-01

## 2023-11-13 PROBLEM — M32.9 LUPUS: Status: ACTIVE | Noted: 2023-01-01

## 2023-11-13 PROBLEM — Z72.0 TOBACCO USE: Status: ACTIVE | Noted: 2023-01-01

## 2023-11-13 NOTE — STS RISK SCORE
Procedure Type: Isolated CABG  Perioperative Outcome Estimate %  Operative Mortality 0.976%  Morbidity & Mortality 7.92%  Stroke 1.57%  Renal Failure 0.687%  Reoperation 2%  Prolonged Ventilation 4.77%  Deep Sternal Wound Infection 0.287%  Long Hospital Stay (>14 days) 3.12%  Short Hospital Stay (<6 days)* 50.4%

## 2023-11-13 NOTE — ANESTHESIA PROCEDURE NOTES
Arterial Line      Patient reassessed immediately prior to procedure    Patient location during procedure: pre-op   Line placed for hemodynamic monitoring.  Preanesthetic Checklist  Completed: patient identified, IV checked, site marked, risks and benefits discussed, surgical consent, monitors and equipment checked, pre-op evaluation and timeout performed  Arterial Line Prep    Sterile Tech: cap, gloves and sterile barriers  Prep: ChloraPrep  Patient monitoring: blood pressure monitoring, continuous pulse oximetry and EKG  Arterial Line Procedure   Laterality:left  Location:  radial artery  Catheter size: 20 G   Guidance: ultrasound guided  Number of attempts: 1  Successful placement: yes   Post Assessment   Dressing Type: line sutured, occlusive dressing applied, secured with tape and wrist guard applied.   Complications no  Circ/Move/Sens Assessment: normal and unchanged.   Patient Tolerance: patient tolerated the procedure well with no apparent complications

## 2023-11-13 NOTE — PAYOR COMM NOTE
"Ronaldo Spain (53 y.o. Male)       Date of Birth   1970    Social Security Number       Address   [address] [facility] [address]    Home Phone   [phone]    MRN   [id number]       [facility]    Marital Status   Single                            Admission Date   11/9/23    Admission Type   Elective    Admitting Provider   Louis Hameed MD    Attending Provider   Louis Hameed MD    Department, Room/Bed   [facility] OR, [facility] OR/MAIN OR       Discharge Date       Discharge Disposition       Discharge Destination                                 Attending Provider: Louis Hameed MD    Allergies: Fish-derived Products, Ondansetron, Shellfish-derived Products, Ketorolac, Protonix  [Pantoprazole Sodium], Metoprolol, Pantoprazole, Penicillin G, Penicillins, Sulfa Antibiotics    Isolation: None   Infection: None   Code Status: Prior    Ht: 170.2 cm (67.01\")   Wt: 93.9 kg (207 lb 0.2 oz)    Admission Cmt: None   Principal Problem: CAD [I25.10]                   Active Insurance as of 11/9/2023       Primary Coverage       Payor Plan Insurance Group Employer/Plan Group    ANTHEM BLUE CROSS ANTHEM BLUE CROSS BLUE SHIELD PPO [id number]       Payor Plan Address Payor Plan Phone Number Payor Plan Fax Number Effective Dates    [address] [phone]  5/1/2017 - None Entered    [facility] [address]         Subscriber Name Subscriber Birth Date Member ID       RONALDO SPAIN 1970 [id number]               Secondary Coverage       Payor Plan Insurance Group Employer/Plan Group    ANTHEM MEDICAID ANTHEM MEDICAID [id number]       Payor Plan Address Payor Plan Phone Number Payor Plan Fax Number Effective Dates    [address] [address] [phone]  7/7/2022 - None Entered    [facility] [address]         Subscriber Name Subscriber Birth Date Member ID       RONALDO SPAIN 1970 [id number]                     Emergency Contacts        (Rel.) Home " Phone Work Phone Mobile Phone    Cathi Spain (Sister) [phone] -- --    Jimi Spain (Mother) -- -- [phone]              Amy LUGO [id number] Tax ID [id number]  Insurance Information                  ANTHEM BLUE CROSS/ANTHEM BLUE CROSS BLUE SHIELD PPO Phone: [phone]    Subscriber: Ronaldo Spain Subscriber#: [id number]    Group#: [id number] Precert#: [id number]        ANTHEM MEDICAID/ANTHEM MEDICAID Phone: [phone]    Subscriber: Ronaldo Spain Subscriber#: [id number]    Group#: [id number] Precert#: --          Current Facility-Administered Medications   Medication Dose Route Frequency Provider Last Rate Last Admin    [MAR Hold] acetaminophen (TYLENOL) tablet 650 mg  650 mg Oral Q4H PRN Cassandra Michelle PA-C        [MAR Hold] albuterol (PROVENTIL) nebulizer solution 0.083% 2.5 mg/3mL  2.5 mg Nebulization Q6H PRN Cassandra Michelle PA-C        [MAR Hold] aspirin chewable tablet 81 mg  81 mg Oral Daily Cassandra Michelle PA-C   81 mg at 11/12/23 0901    [MAR Hold] atorvastatin (LIPITOR) tablet 40 mg  40 mg Oral Nightly Cassandra Michelle PA-C   40 mg at 11/12/23 2105    [MAR Hold] benzonatate (TESSALON) capsule 200 mg  200 mg Oral TID PRN Cassandra Michelle PA-C        [MAR Hold] Chlorhexidine Gluconate Cloth 2 % pads 1 application   1 application  Topical Q12H PRN Cassandra Michelle PA-C        [MAR Hold] cholecalciferol (VITAMIN D3) capsule 50,000 Units  50,000 Units Oral Q7 Days Cassandra Michelle PA-C   50,000 Units at 11/10/23 1022    electrolyte-A 500 mL with heparin (porcine) 5,000 Units mixture    PRN Louis Hameed MD   Given at 11/13/23 1233    famotidine (PEPCID) injection 20 mg  20 mg Intravenous Q12H Jay Frankel MD        famotidine (PEPCID) tablet 20 mg  20 mg Oral BID Jay Peguero MD        [MAR Hold] hydroCHLOROthiazide (HYDRODIURIL) tablet 25 mg  25 mg Oral Daily Cassandra Michelle PA-C   25 mg at 11/12/23 0901    [MAR Hold] HYDROcodone-acetaminophen (NORCO) 7.5-325 MG per  tablet 1 tablet  1 tablet Oral Q8H PRN Louis Hameed MD   1 tablet at 11/12/23 1532    [MAR Hold] ipratropium-albuterol (DUO-NEB) nebulizer solution 3 mL  3 mL Nebulization Q4H PRN Cassandra Michelle PA-C        labetalol (NORMODYNE) tablet 400 mg  400 mg Oral BID Cassandra Michelle PA-C   400 mg at 11/12/23 2106    [MAR Hold] lansoprazole (PREVACID SOLUTAB) disintegrating tablet Tablet Delayed Release Dispersible 30 mg  30 mg Oral Q AM Cassandra Michelle PA-C   30 mg at 11/13/23 0600    lisinopril (PRINIVIL,ZESTRIL) tablet 40 mg  40 mg Oral Daily Cassandra Michelle PA-C   40 mg at 11/12/23 0900    [MAR Hold] morphine injection 2 mg  2 mg Intravenous Q4H PRN Danelle Stone MD   2 mg at 11/13/23 0612    niCARdipine (CARDENE) 25mg in 250mL NS infusion  5-15 mg/hr Intravenous Titrated Cassandra Michelle PA-C   Held at 11/10/23 0120    niCARdipine (CARDENE) 25mg in 250mL NS infusion  5-15 mg/hr Intravenous Titrated Hamilton Haines MD        [MAR Hold] nicotine (NICODERM CQ) 14 MG/24HR patch 1 patch  1 patch Transdermal Q24H Cassandra Michelle PA-C   1 patch at 11/12/23 0900    papaverine injection    PRN Louis Hameed MD   60 mg at 11/13/23 1233    [MAR Hold] Pharmacy Consult - MTM   Does not apply Daily Danny Ingram, PharmD        [MAR Hold] promethazine (PHENERGAN) tablet 25 mg  25 mg Oral Q6H PRN Cassandra Michelle PA-C   25 mg at 11/12/23 1529    [MAR Hold] sertraline (ZOLOFT) tablet 150 mg  150 mg Oral Daily Cassandra Michelle PA-C   150 mg at 11/12/23 0901    sodium chloride (NS) irrigation solution    PRN Louis Hameed MD   2,000 mL at 11/13/23 1234    [MAR Hold] sodium chloride 0.9 % flush 10 mL  10 mL Intravenous Q12H Cassandra Michelle PA-C   10 mL at 11/12/23 2108    [MAR Hold] sodium chloride 0.9 % flush 10 mL  10 mL Intravenous PRN Cassandra Michelle PA-C        [MAR Hold] sodium chloride 0.9 % infusion 40 mL  40 mL Intravenous PRN Cassandra Michelle PA-C        sodium chloride 0.9 % solution    PRN Louis Hameed MD    2,000 mL at 11/13/23 1233    [MAR Hold] sucralfate (CARAFATE) tablet 1 g  1 g Oral 4x Daily Cassandra Michelle PA-C   1 g at 11/12/23 2105    [MAR Hold] tiZANidine (ZANAFLEX) tablet 4 mg  4 mg Oral TID PRN Cassandra Michelle PA-C         Facility-Administered Medications Ordered in Other Encounters   Medication Dose Route Frequency Provider Last Rate Last Admin    aminocaproic acid (AMICAR) injection   Intravenous PRN Hamilton Haines MD   10 g at 11/13/23 1210    etomidate (AMIDATE) injection   Intravenous PRN Hamilton Haines MD   14 mg at 11/13/23 1124    fentaNYL citrate (PF) (SUBLIMAZE) injection   Intravenous PRN Hamilton Haines MD   250 mcg at 11/13/23 1217    heparin (porcine) injection   Intravenous PRHamilton Mehta MD   40,000 Units at 11/13/23 1338    lactated ringers infusion   Intravenous Continuous PRN Hamilton Haines MD   New Bag at 11/13/23 1124    midazolam (VERSED) injection   Intravenous PRN Hamilton Haines MD   3 mg at 11/13/23 1124    rocuronium (ZEMURON) injection   Intravenous PRHamilton Mehta MD   20 mg at 11/13/23 1520     Lab Results (last 24 hours)       Procedure Component Value Units Date/Time    P2Y12 Platelet Inhibition [[id number]] Collected: 11/13/23 0623    Specimen: Blood Updated: 11/13/23 0710     P2Y12 Reactivity Unit 159 PRU     Narrative:      P2Y12 Interpretation:  Pre-Drug normal reference range is 194-418 PRU.  Test results are reported in P2Y12 reaction units (PRU). This measures the extent of platelet aggregation in the presence of P2Y12 inhibitor drugs, such as clopidogrel (Plavix), prasugrel (Effient), ticagrelor (Brilinta), ticlopidine (Ticlid).  P2Y12 values <194 PRU (low end of reference range) are specific evidence of a P2Y12 inhibitor effect.  Patients who have been treated with Glycoprotein IIb/IIIa inhibitors should not be tested until platelet function has recovered. This time period is approximately 14 days after discontinuation of abciximab (ReoPro)  and up to 48 hours after discontinuation of eptifibatide (Integrilin) and tirofiban (Aggrastat).   The P2Y12 test results should be interpreted in conjunction with other clinical and lab data available to the clinician.    CBC & Differential [[id number]]  (Abnormal) Collected: 11/13/23 0623    Specimen: Blood Updated: 11/13/23 0645    Narrative:      The following orders were created for panel order CBC & Differential.  Procedure                               Abnormality         Status                     ---------                               -----------         ------                     CBC Auto Differential[[id number]]        Abnormal            Final result                 Please view results for these tests on the individual orders.    CBC Auto Differential [[id number]]  (Abnormal) Collected: 11/13/23 0623    Specimen: Blood Updated: 11/13/23 0645     WBC 7.65 10*3/mm3      RBC 4.96 10*6/mm3      Hemoglobin 16.1 g/dL      Hematocrit 48.6 %      MCV 98.0 fL      MCH 32.5 pg      MCHC 33.1 g/dL      RDW 12.1 %      RDW-SD 43.8 fl      MPV 11.5 fL      Platelets 204 10*3/mm3      Neutrophil % 64.2 %      Lymphocyte % 23.3 %      Monocyte % 9.8 %      Eosinophil % 2.0 %      Basophil % 0.3 %      Immature Grans % 0.4 %      Neutrophils, Absolute 4.92 10*3/mm3      Lymphocytes, Absolute 1.78 10*3/mm3      Monocytes, Absolute 0.75 10*3/mm3      Eosinophils, Absolute 0.15 10*3/mm3      Basophils, Absolute 0.02 10*3/mm3      Immature Grans, Absolute 0.03 10*3/mm3      nRBC 0.0 /100 WBC           Imaging Results (Last 24 Hours)       ** No results found for the last 24 hours. **          Orders (last 24 hrs)        Start     Ordered    11/13/23 1532  Prepare Fresh Frozen Plasma, 2 Units  Blood - Once         11/13/23 1531    11/13/23 1532  OR Blood Pickup  Once         11/13/23 1531 11/13/23 1531  Prepare Platelet Pheresis, 2 Units  Blood - Once         11/13/23 1531    11/13/23 1531  OR Blood Pickup  Once          11/13/23 1531    11/13/23 1529  Tissue Pathology Exam  RELEASE UPON ORDERING         11/13/23 1529    11/13/23 1248  niCARdipine (CARDENE) 25mg in 250mL NS infusion  Titrated         11/13/23 1246    11/13/23 1241  Insert Indwelling Urinary Catheter  Once        Comments: Follow Protocol As Outlined in Process Instructions (Open Order Report to View Full Instructions)   See Hyperspace for full Linked Orders Report.    11/13/23 1240    11/13/23 1241  Assess Need for Indwelling Urinary Catheter - Follow Removal Protocol  Continuous        Comments: Follow Protocol As Outlined in Process Instructions (Open Order Report to View Full Instructions)   See Hyperspace for full Linked Orders Report.    11/13/23 1240    11/13/23 1241  Urinary Catheter Care  Every Shift      See Hyperspace for full Linked Orders Report.    11/13/23 1240    11/13/23 1234  sodium chloride (NS) irrigation solution  As Needed         11/13/23 1234    11/13/23 1233  papaverine injection  As Needed         11/13/23 1233    11/13/23 1233  electrolyte-A 500 mL with heparin (porcine) 5,000 Units mixture  As Needed         11/13/23 1233    11/13/23 1233  sodium chloride 0.9 % solution  As Needed         11/13/23 1233    11/13/23 0935  famotidine (PEPCID) tablet 20 mg  2 Times Daily Before Meals         11/13/23 0933    11/13/23 0935  midazolam (VERSED) injection 2 mg  Once         11/13/23 0933    11/13/23 0900  famotidine (PEPCID) injection 20 mg  Every 12 Hours Scheduled         11/13/23 0858    11/13/23 0900  labetalol (NORMODYNE) tablet 400 mg  Once         11/13/23 0858    11/13/23 0844  ceFAZolin 2000 mg IVPB in 100 mL NS (MBP)  Once         11/13/23 0842    11/13/23 0838  OR Blood Pickup  Once         11/13/23 0837    11/13/23 0600  ceFAZolin 2000 mg IVPB in 100 mL NS (MBP)  On Call to O.R.         11/09/23 2344    11/13/23 0600  CBC Auto Differential  PROCEDURE ONCE        Comments: Discontinue After Heparin Stopped      11/12/23 2201    11/13/23  0001  NPO Diet NPO Type: Sips with Meds  Diet Effective Midnight         11/09/23 2344    11/12/23 2100  chlorhexidine (PERIDEX) 0.12 % solution 15 mL  Every 12 Hours Scheduled         11/10/23 1318    11/12/23 2100  mupirocin (BACTROBAN) 2 % nasal ointment 1 application   Every 12 Hours Scheduled         11/10/23 1318    11/12/23 1545  [MAR Hold]  morphine injection 2 mg  Every 4 Hours PRN        (MAR Hold since Mon 11/13/2023 at 0828.Hold Reason: Unreviewed Transfer Orders)    11/12/23 1546    11/12/23 0900  [MAR Hold]  Pharmacy Consult - MTM  Daily        (MAR Hold since Mon 11/13/2023 at 0828.Hold Reason: Unreviewed Transfer Orders)    11/10/23 0848    11/10/23 1800  DIET MESSAGE NO seafood of any kind pt allergic  Daily With Breakfast, Lunch & Dinner      Comments: NO seafood of any kind pt allergic    11/10/23 1412    11/10/23 0900  [MAR Hold]  aspirin chewable tablet 81 mg  Daily        (MAR Hold since Mon 11/13/2023 at 0828.Hold Reason: Unreviewed Transfer Orders)    11/09/23 2346    11/10/23 0900  [MAR Hold]  cholecalciferol (VITAMIN D3) capsule 50,000 Units  Every 7 Days        (MAR Hold since Mon 11/13/2023 at 0828.Hold Reason: Unreviewed Transfer Orders)    11/09/23 2346    11/10/23 0900  lisinopril (PRINIVIL,ZESTRIL) tablet 40 mg  Daily         11/09/23 2346    11/10/23 0900  [MAR Hold]  nicotine (NICODERM CQ) 14 MG/24HR patch 1 patch  Every 24 Hours Scheduled        (MAR Hold since Mon 11/13/2023 at 0828.Hold Reason: Unreviewed Transfer Orders)    11/09/23 2346    11/10/23 0900  [MAR Hold]  sertraline (ZOLOFT) tablet 150 mg  Daily        (MAR Hold since Mon 11/13/2023 at 0828.Hold Reason: Unreviewed Transfer Orders)    11/09/23 2346    11/10/23 0900  [MAR Hold]  hydroCHLOROthiazide (HYDRODIURIL) tablet 25 mg  Daily        (MAR Hold since Mon 11/13/2023 at 0828.Hold Reason: Unreviewed Transfer Orders)    11/09/23 2347    11/10/23 0700  POC Glucose 4x Daily Before Meals & at Bedtime  4 Times Daily Before  Meals & at Bedtime       11/09/23 2344    11/10/23 0600  CBC & Differential  Every Third Day,   Status:  Canceled      Comments: Discontinue After Heparin Stopped      11/09/23 2343    11/10/23 0600  Instruct Patient on Coughing, Deep Breathing and Incentive Spirometry  Every 4 Hours While Awake       11/09/23 2344    11/10/23 0600  P2Y12 Platelet Inhibition  Daily       11/09/23 2344    11/10/23 0600  [MAR Hold]  lansoprazole (PREVACID SOLUTAB) disintegrating tablet Tablet Delayed Release Dispersible 30 mg  Every Early Morning        (MAR Hold since Mon 11/13/2023 at 0828.Hold Reason: Unreviewed Transfer Orders)    11/09/23 2346    11/10/23 0045  [MAR Hold]  atorvastatin (LIPITOR) tablet 40 mg  Nightly        (MAR Hold since Mon 11/13/2023 at 0828.Hold Reason: Unreviewed Transfer Orders)    11/09/23 2346    11/10/23 0045  labetalol (NORMODYNE) tablet 400 mg  2 Times Daily         11/09/23 2346    11/10/23 0045  [MAR Hold]  sucralfate (CARAFATE) tablet 1 g  4 Times Daily        (MAR Hold since Mon 11/13/2023 at 0828.Hold Reason: Unreviewed Transfer Orders)    11/09/23 2346    11/10/23 0045  niCARdipine (CARDENE) 25mg in 250mL NS infusion  Titrated         11/09/23 2349    11/10/23 0030  heparin 79293 units/250 mL (100 units/mL) in 0.45 % NaCl infusion  Titrated,   Status:  Discontinued         11/09/23 2343    11/10/23 0030  [MAR Hold]  sodium chloride 0.9 % flush 10 mL  Every 12 Hours Scheduled        (MAR Hold since Mon 11/13/2023 at 0828.Hold Reason: Unreviewed Transfer Orders)    11/09/23 2344    11/10/23 0000  Neurovascular Checks  Every 4 Hours       11/09/23 2344 11/09/23 2348  [MAR Hold]  HYDROcodone-acetaminophen (NORCO) 7.5-325 MG per tablet 1 tablet  Every 8 Hours PRN        (MAR Hold since Mon 11/13/2023 at 0828.Hold Reason: Unreviewed Transfer Orders)    11/09/23 2348 11/09/23 2346  [MAR Hold]  tiZANidine (ZANAFLEX) tablet 4 mg  3 Times Daily PRN        (MAR Hold since Mon 11/13/2023 at 0828.Hold  Reason: Unreviewed Transfer Orders)    11/09/23 2346 11/09/23 2346  [MAR Hold]  promethazine (PHENERGAN) tablet 25 mg  Every 6 Hours PRN        (MAR Hold since Mon 11/13/2023 at 0828.Hold Reason: Unreviewed Transfer Orders)    11/09/23 2346 11/09/23 2345  [MAR Hold]  benzonatate (TESSALON) capsule 200 mg  3 Times Daily PRN        (MAR Hold since Mon 11/13/2023 at 0828.Hold Reason: Unreviewed Transfer Orders)    11/09/23 2346 11/09/23 2345  [MAR Hold]  albuterol (PROVENTIL) nebulizer solution 0.083% 2.5 mg/3mL  Every 6 Hours PRN        (MAR Hold since Mon 11/13/2023 at 0828.Hold Reason: Unreviewed Transfer Orders)    11/09/23 2346 11/09/23 2343  Pharmacy to Dose Heparin  Continuous PRN,   Status:  Discontinued         11/09/23 2343 11/09/23 2341  [MAR Hold]  acetaminophen (TYLENOL) tablet 650 mg  Every 4 Hours PRN        (MAR Hold since Mon 11/13/2023 at 0828.Hold Reason: Unreviewed Transfer Orders)    11/09/23 2344 11/09/23 2341  [MAR Hold]  Chlorhexidine Gluconate Cloth 2 % pads 1 application   Every 12 Hours PRN        (MAR Hold since Mon 11/13/2023 at 0828.Hold Reason: Unreviewed Transfer Orders)    11/09/23 2344 11/09/23 2341  Skin Assessment  Every Shift       11/09/23 2344 11/09/23 2340  [MAR Hold]  sodium chloride 0.9 % flush 10 mL  As Needed        (MAR Hold since Mon 11/13/2023 at 0828.Hold Reason: Unreviewed Transfer Orders)    11/09/23 2344 11/09/23 2340  [MAR Hold]  sodium chloride 0.9 % infusion 40 mL  As Needed        (MAR Hold since Mon 11/13/2023 at 0828.Hold Reason: Unreviewed Transfer Orders)    11/09/23 2344 11/09/23 2340  [MAR Hold]  ipratropium-albuterol (DUO-NEB) nebulizer solution 3 mL  Every 4 Hours PRN        (MAR Hold since Mon 11/13/2023 at 0828.Hold Reason: Unreviewed Transfer Orders)    11/09/23 2344    --  lisinopril (PRINIVIL,ZESTRIL) 40 MG tablet  2 Times Daily         11/10/23 0847    --  SCANNED - TELEMETRY           11/09/23 0000                      Physician Progress Notes (last 24 hours)        Sofy Wilson APRN at 11/13/23 0741          Cardiothoracic Surgery Progress Note      CC: CAD     LOS: 4 days      Subjective:  Denies chest pain or dyspnea.     Objective:  Vital Signs  Temp:  [95.3 °F (35.2 °C)-96.8 °F (36 °C)] 95.3 °F (35.2 °C)  Heart Rate:  [65-66] 65  Resp:  [18-20] 18  BP: ()/() 130/90    Physical Exam:   General Appearance: alert, appears stated age and cooperative   Lungs: clear to auscultation, respirations regular, respirations even, and respirations unlabored   Heart: regular rhythm & normal rate, normal S1, S2, no murmur, no gallop, no rub, and no click   Skin: warm, well perfused      Results:    Results from last 7 days   Lab Units 11/13/23  0623   WBC 10*3/mm3 7.65   HEMOGLOBIN g/dL 16.1   HEMATOCRIT % 48.6   PLATELETS 10*3/mm3 204     Results from last 7 days   Lab Units 11/10/23  0926   SODIUM mmol/L 136   POTASSIUM mmol/L 3.9   CHLORIDE mmol/L 99   CO2 mmol/L 27.0   BUN mg/dL 20   CREATININE mg/dL 1.01   GLUCOSE mg/dL 118*   CALCIUM mg/dL 9.0       Assessment:  CAD    Plan:  P2Y12 159  CABG today with TATUM Lawrence  11/13/23  07:41 EST         Electronically signed by Sofy Wilson APRN at 11/13/23 0742       Consult Notes (last 24 hours)  Notes from 11/12/23 1544 through 11/13/23 1544   No notes of this type exist for this encounter.

## 2023-11-13 NOTE — ANESTHESIA PROCEDURE NOTES
Central Line      Patient reassessed immediately prior to procedure    Patient location during procedure: OR  Indications: vascular access  Preanesthetic Checklist  Completed: patient identified, IV checked, site marked, risks and benefits discussed, surgical consent, monitors and equipment checked, pre-op evaluation and timeout performed  Central Line Prep  Sterile Tech:cap, gloves, gown, mask and sterile barriers  Prep: chloraprep  Patient monitoring: blood pressure monitoring, continuous pulse oximetry and EKG  Central Line Procedure  Laterality:right  Location:internal jugular  Catheter Type:Cordis and MAC  Catheter Size:9 Fr  Guidance:ultrasound guided  PROCEDURE NOTE/ULTRASOUND INTERPRETATION.  Using ultrasound guidance the potential vascular sites for insertion of the catheter were visualized to determine the patency of the vessel to be used for vascular access.  After selecting the appropriate site for insertion, the needle was visualized under ultrasound being inserted into the internal jugular vein, followed by ultrasound confirmation of wire and catheter placement. There were no abnormalities seen on ultrasound; an image was taken; and the patient tolerated the procedure with no complications. Images: still images obtained, printed/placed on chart  Assessment  Post procedure:biopatch applied, line sutured, occlusive dressing applied and secured with tape  Assessement:blood return through all ports, free fluid flow, chest x-ray ordered and Aguila Test  Complications:no  Patient Tolerance:patient tolerated the procedure well with no apparent complications

## 2023-11-13 NOTE — ANESTHESIA PROCEDURE NOTES
Airway  Urgency: elective    Date/Time: 11/13/2023 11:29 AM  Airway not difficult    General Information and Staff    Patient location during procedure: OR    Indications and Patient Condition  Indications for airway management: airway protection    Preoxygenated: yes  MILS not maintained throughout  Mask difficulty assessment: 1 - vent by mask    Final Airway Details  Final airway type: endotracheal airway      Successful airway: ETT  Cuffed: yes   Successful intubation technique: direct laryngoscopy and video laryngoscopy  Endotracheal tube insertion site: oral  Blade: Wilfred  Blade size: 3  ETT size (mm): 8.0  Cormack-Lehane Classification: grade I - full view of glottis  Placement verified by: chest auscultation and capnometry   Measured from: lips  ETT/EBT  to lips (cm): 20  Number of attempts at approach: 1  Assessment: lips, teeth, and gum same as pre-op and atraumatic intubation    Additional Comments  Negative epigastric sounds, Breath sound equal bilaterally with symmetric chest rise and fall

## 2023-11-13 NOTE — ANESTHESIA PROCEDURE NOTES
Intra-Op Anesthesia ELMIRA    Procedure Performed: Intra-Op Anesthesia ELMIRA       Start Time:        End Time:      Preanesthesia Checklist:  Patient identified, IV assessed, risks and benefits discussed, monitors and equipment assessed, procedure being performed at surgeon's request and anesthesia consent obtained.    General Procedure Information  Diagnostic Indications for Echo:  assessment of ascending aorta, assessment of surgical repair and hemodynamic monitoring  Physician Requesting Echo: Louis Hameed MD  Location performed:  OR  Intubated  Bite block not placed  Heart visualized  Probe Insertion:  Easy  Probe Type:  Multiplane  Modalities:  Color flow mapping, continuous wave Doppler and pulse wave Doppler    Echocardiographic and Doppler Measurements    Ventricles    Right Ventricle:  Hypertrophy not present.  Thrombus not present.  Global function normal.    Left Ventricle:  Cavity size normal.  Thrombus not present.  Global Function normal.  Ejection Fraction 50%.          Valves    Aortic Valve:  Annulus normal.  Stenosis not present.  Area: 2.6 cm².  Regurgitation absent.  Leaflets normal.  Leaflet motions normal.      Mitral Valve:  Annulus normal.  Stenosis not present.  Area: 4.8 cm².  Mean Gradient: 2 mmHg.  Regurgitation mild.  Leaflets normal.  Leaflet motions normal.      Tricuspid Valve:  Annulus normal.  Leaflets normal.  Leaflet motions normal.        Aorta    Ascending Aorta:  Size normal.  Dissection not present.  Plaque thickness less than 3 mm.  Mobile plaque not present.    Aortic Arch:  Size normal.  Dissection not present.  Plaque thickness less than 3 mm.  Mobile plaque not present.    Descending Aorta:  Size normal.  Dissection not present.  Plaque thickness less than 3 mm.  Mobile plaque not present.        Atria    Right Atrium:  Size normal.  Spontaneous echo contrast not present.  Thrombus not present.  Tumor not present.  Device present.    Left Atrium:  Size normal.  Spontaneous  echo contrast not present.  Thrombus not present.  Tumor not present.  Left atrial appendage normal.      Septa        Ventricular Septum:  Intra-ventricular septum morphology normal.          Other Findings  Pericardium:  normal  Pleural Effusion:  none  Pulmonary Arteries:  normal  Pulmonary Venous Flow:  normal    Anesthesia Information  Performed Personally  Anesthesiologist:  Hamilton Haines MD      Echocardiogram Comments:       Informed consent was obtained preoperatively.  Sb probe passed without difficulty. Post CABG, ascending aortic interposition graft:  EF 55 % on vasopressors.  Aortic flap noted in arch extending to distal aorta with flow in both lumens.  Surgeons informed.

## 2023-11-13 NOTE — PROGRESS NOTES
Cardiothoracic Surgery Progress Note      CC: CAD     LOS: 4 days      Subjective:  Denies chest pain or dyspnea.     Objective:  Vital Signs  Temp:  [95.3 °F (35.2 °C)-96.8 °F (36 °C)] 95.3 °F (35.2 °C)  Heart Rate:  [65-66] 65  Resp:  [18-20] 18  BP: ()/() 130/90    Physical Exam:   General Appearance: alert, appears stated age and cooperative   Lungs: clear to auscultation, respirations regular, respirations even, and respirations unlabored   Heart: regular rhythm & normal rate, normal S1, S2, no murmur, no gallop, no rub, and no click   Skin: warm, well perfused      Results:    Results from last 7 days   Lab Units 11/13/23  0623   WBC 10*3/mm3 7.65   HEMOGLOBIN g/dL 16.1   HEMATOCRIT % 48.6   PLATELETS 10*3/mm3 204     Results from last 7 days   Lab Units 11/10/23  0926   SODIUM mmol/L 136   POTASSIUM mmol/L 3.9   CHLORIDE mmol/L 99   CO2 mmol/L 27.0   BUN mg/dL 20   CREATININE mg/dL 1.01   GLUCOSE mg/dL 118*   CALCIUM mg/dL 9.0       Assessment:  CAD    Plan:  P2Y12 159  CABG today with Dr. Zari Wilson, APRN  11/13/23  07:41 EST

## 2023-11-13 NOTE — ANESTHESIA PREPROCEDURE EVALUATION
Anesthesia Evaluation     Patient summary reviewed and Nursing notes reviewed                Airway   Mallampati: II  TM distance: >3 FB  Neck ROM: full  No difficulty expected  Dental - normal exam     Pulmonary - normal exam   (+) a smoker Current,sleep apnea  Cardiovascular - normal exam    (+) hypertension, CAD    ROS comment:   Echo 11/23: normal EF, no significant valve disease    Neuro/Psych- negative ROS  GI/Hepatic/Renal/Endo    (+) obesity, GERD    Musculoskeletal (-) negative ROS    Abdominal  - normal exam    Bowel sounds: normal.   Substance History - negative use     OB/GYN negative ob/gyn ROS         Other          Other Comment: P2Y12 159                Anesthesia Plan    ASA 4     general     (A line, CVL, ICU/vent)  intravenous induction     Anesthetic plan, risks, benefits, and alternatives have been provided, discussed and informed consent has been obtained with: patient.    CODE STATUS:

## 2023-11-14 LAB
BH BB BLOOD EXPIRATION DATE: NORMAL
BH BB BLOOD TYPE BARCODE: 2800
BH BB BLOOD TYPE BARCODE: 5100
BH BB BLOOD TYPE BARCODE: 6200
BH BB BLOOD TYPE BARCODE: 7300
BH BB DISPENSE STATUS: NORMAL
BH BB PRODUCT CODE: NORMAL
BH BB UNIT NUMBER: NORMAL
CROSSMATCH INTERPRETATION: NORMAL
UNIT  ABO: NORMAL
UNIT  RH: NORMAL

## 2023-11-14 NOTE — OP NOTE
DATE OF PROCEDURE: 11/14/2023     PREOPERATIVE DIAGNOSES:  1. Multivessel coronary artery disease  2. NSTEMI  3. Hypertension  4. Obstructive sleep apnea  5. History of stroke  6. Active tobacco abuse  7. Crohn's disease  8. Lupus     POSTOPERATIVE DIAGNOSES:    1. Multivessel coronary artery disease  2. NSTEMI  3. Hypertension  4. Obstructive sleep apnea  5. History of stroke  6. Active tobacco abuse  7. Crohn's disease  8. Lupus     PROCEDURES PERFORMED:    1. Ascending aortic dissection repair  2. Coronary artery bypass graft x 4  3. Endoscopic vein harvesting (Right greater saphenous vein).       SURGEON: Louis Hameed MD       ASSISTANTS:    John Cameron MD was responsible for performing the following activities: Retraction and Suction and their skilled assistance was necessary for the success of this case.  Jesse Worrell PA; Cassandra Michelle PA-C were responsible for performing the following activities: Retraction, Suction, Closing, Placing Dressing, and Harvesting of Vessels and their skilled assistance was necessary for the success of this case.    Circulator: Harriet Gorman RN; Maria Luisa Woods, MELA; Haase, Sherri L, RN; Genoveva Carpenter RN  Perfusionist: Ricoc Hogue  Scrub Person: Greta Martinez; Krista Boone; Dhaval Colunga  Nursing Assistant: Bre Clemons PCT; Kaleigh Osborne; Alana Stewart    ANESTHESIA: General endotracheal anesthesia with Dr. Dhaval Massey and Dr. Hamilton Haines MD     ESTIMATED BLOOD LOSS: 500 mL     CROSSCLAMP TIME: 220 minutes       TOTAL CARDIOPULMONARY BYPASS TIME: 323 minutes      DISTAL BYPASS TARGETS:    1. LIMA to LAD  2. Greater saphenous vein to OM1  3. Greater saphenous vein to OM2  4. Greater saphenous vein to Diagonal    INDICATIONS:  53-year-old  male with a history of hypertension, sleep apnea, stroke, active tobacco abuse, Crohn's disease and lupus who presented with substernal chest pain radiating to the  jaw/arm, nausea, vomiting and shortness of breath.  He was found to have multivessel coronary artery disease and was felt to warrant surgical revascularization. The risks and benefits of surgery were discussed with the patient including pain, bleeding, infection, renal failure, stroke and death along with the STS risk score. The patient understood these risks and wished to proceed with surgery.      DESCRIPTION OF PROCEDURE: The patient was taken to the operating room and placed under general endotracheal anesthesia. A central line, Berkshire-Eren catheter, radial arterial line, and Malloy catheter were placed. The patient was prepped and draped in the usual sterile fashion and a timeout was performed, including the patient's name, procedure, consent, beta blockade administration and antibiotics were verified.    The right greater saphenous vein was harvested from the groin to the ankle using EVH technique. Subcutaneous tissues were closed with a running 3-0 Vicryl suture and 4-0 Monocryl subcuticular stitch. Simultaneously, a median sternotomy incision was made and electrocautery was utilized to gain access to the sternum. A midline sternotomy was performed after lung desufflation and hemostasis was achieved with electrocautery.    Attention was turned to the left internal mammary artery, which was taken down using electrocautery and small clips. Dissection was performed proximally to the subclavian vein and distally to the bifurcation. The bifurcation was ligated with 2 large clips to each branch and sharply divided. This revealed excellent flow within the mammary artery which was ligated distally using small clips and irrigated with papaverine solution and placed in a soaked Ray-Mane sponge in the left pleural space. The pericardium was opened and stay sutures were placed to create a pericardial well. Next, 3-0 Prolene sutures were placed in the ascending aorta and systemic heparin was administered. The aortic cannula  was easily inserted with no difficulty.  The aortic wall was very thin.  Additional cannulation sutures were placed in the right atrial appendage, ascending aorta, and right atrium. After verification of satisfactory activated clotting time, the arterial cannula was placed and connected to the cardiopulmonary bypass circuit after being de-aired. The line was tested and a wrap was performed. The venous cannula was inserted followed by antegrade and retrograde cardioplegia lines. The vein was inspected and found to be of appropriate caliber and quality for bypass grafting. A slit within the pericardial well along the cephalad portion was created for appropriate transition of the mammary pedicle into the mediastinum. Cardiopulmonary bypass was initiated and the patient was allowed to drift in temperature and distal bypass targets were verified. Flows while on bypass were excellent with normal mean pressures and no problems with the line pressure.  Bypass flow was dropped and the aortic crossclamp was applied. Cardioplegia was administered in an antegrade fashion with cessation of cardiac activity. There was a marginal septal temperature response. The root vent suction was turned on high and additional cardioplegia was given via retrograde with an excellent septal temperature response.  The right coronary artery distribution was thoroughly inspected.  The distal lesion on the PDA was quite distal and the remaining vessel was small in caliber and not felt to be an acceptable bypass target.      The second obtuse marginal branch was heavily diseased diffusely on palpation measuring 1.5 mm in diameter and an arteriotomy was made on the mid portion of this vessel. An end-to-side anastomosis was performed with running 7-0 Prolene suture and tied down. Cardioplegia was given down the anastomosis via hand injection with no evidence of leak and good blood flow.  The first obtuse marginal branch was heavily diseased diffusely on  palpation, measuring 1.25 mm in diameter and an arteriotomy was made on the distal portion of this vessel where the visible and palpable lipid laden disease normalized.  A side-to-side anastomosis was performed utilizing the vein conduit from the second obtuse marginal with a running 7-0 Prolene suture and tied down. Cardioplegia was given down the anastomosis via hand injection with no evidence of leak and good blood flow. Verification of vein length was obtained by filling the heart and the vein graft was trimmed to the appropriate length. The diagonal branch was also diffusely diseased on palpation measuring 1.25 mm in diameter and an arteriotomy was made on the mid to distal portion of this vessel. An end-to-side anastomosis was performed with running 7-0 Prolene suture and tied down. Cardioplegia was given down the anastomosis via hand injection with no evidence of leak and good blood flow. Verification of vein length was obtained by filling the heart and the vein graft was trimmed to the appropriate length. The LAD was inspected and found to have proximal to mid vessel disease on palpation. An arteriotomy was made on the mid LAD and extended proximally and distally on this 1.25 mm diameter vessel. A 1 mm probe would advance down to the apex and stopped just proximal to the arteriotomy at the known critical disease.  The internal mammary artery was then prepared for grafting by ligating the 2 venous branches along the pedicle using small clips. The mammary artery was trimmed proximal to the bifurcation and beveled for grafting. An end-to-side anastomosis with an 8-0 Prolene suture was constructed and tied down. The bulldog clamp was removed and there was excellent flow within the vessel and adequate filling of the LAD. The underlying mammary fascia was secured to the epicardium using two 6-0 Prolene sutures.  At this point two aortotomies were made and the punch would not advance into the aorta.  Visual  inspection was concerning for a dissection on the ascending aorta.  A transverse aortotomy was made and suspicions for dissection were confirmed.  The vessel wall was dissected circumferentially with no obvious intimal tear.  At this point, the ascending aortic dissection had to be repaired with ascending aortic replacement.  The ascending aorta was divided just above the coronary arteries and just proximal to the aortic cross clamp.  No intimal tear was appreciated and the aortic root/coronary ostia appeared preserved.  A 24 mm Hemashield Platinum tube graft was selected and proximal anastomosis performed with a running 3-0 Prolene suture.  The heart was filled and graft trimmed to the appropriate length.  The distal anastomosis performed with a running 3-0 Prolene suture.  Bioglue was applied to the anastomoses.  Three aortotomies were made for the two proximals and aortic root cannula.  The proximal anastomoses were carried out with a running 6-0 prolene suture and labeled with radiopaque washers.  A hot shot was given down the ascending aorta after bulldog clamps were applied to the vein grafts. The veins were de-aired and the patient was placed in steep Trendelenburg position. The root vent was turned on high suction and cardiopulmonary bypass flow was turned down with crossclamp subsequently removed. Bypass flows were returned to normal and the bulldog clamps were removed. The heart returned to spontaneous sinus rhythm.  There was bleeding posteriorly quite low behind the aorta well below the proximal anastomosis.  The appeared to be from the junction between the left atrium and left ventricular outflow tract.    Pledgeted prolene sutures were placed with failure to obtain hemostasis.    Decision was made to go back on bypass and clamp reapplied to the ascending aorta.  Antegrade and retrograde cardioplegia was given with cessation of cardiac activity.  I elected to take down the distal anastomosis just  proximal to the sutures.  The suture line was intact with no defects present.  The proxima ascending aortic anastomosis was intact with no defect present.  The graft was removed and the aortic root inspected.  No intimal tear was appreciated to explain the bleeding present.  The proximal anastomosis was performed with a running 3-0 Prolene suture incorporating a felt strip.  The distal anastomosis was also performed with a running 3-0 Prolene suture and felt strip.  Three aortotomies were then made on the proximal ascending aortic graft utilizing the I cautery and end-to-side proximal anastomoses were carried out using 6-0 Prolene suture and labeled with a radiopaque washers. The aortic root cannula was inserted into the third aortotomy.  A hot shot was given down the ascending aorta after bulldog clamps were applied to the vein grafts. The veins were de-aired and the patient was placed in steep Trendelenburg position. The root vent was again turned on high suction and cardiopulmonary bypass flow was turned down with crossclamp subsequently removed. Bypass flows were returned to normal and the bulldog clamps were removed. The heart returned to spontaneous sinus rhythm.  The aorta was packed with gauze and the patient was subsequently weaned from cardiopulmonary bypass and decannulation was successfully carried out. All cannulation sites were reinforced with additional 4-0 Prolene suture and inspected for hemostasis.  The aortic cannula removal was hemostatic with the two previously placed sutures.  This was reinforced with a pledgeted suture.      The posterior aorta again had bleeding present deep behind the heart near the left atrium and left ventricular outflow tract.  Pledgeted 4-0 V7 prolene sutures were placed across a palpable defect quite low.  This defect was well below the proximal aortic anastomosis.  The patient had an episode of hypotension that was responsive to volume.  The heart became sluggish and  responded to intracardiac massage, correction of his blood gas and electrolytes.  At this juncture the heart was quite snappy.  The proximal and distal anastomoses were then inspected and found to be hemostatic.  Hemostasis was achieved posterior to the ascending aorta with a CTX pledgeted needle across a palpable defect.    Doppler examination of bypass grafts revealed excellent flow within the mammary and vein grafts.  The heart contraction was excellent.  Ventricular pacing wires were placed and secured using 0 silk suture. Four chest tubes were then placed within the left and right pleural spaces and mediastinum. These were secured using a 0 Ethibond suture.   Just prior to placing sternal wires, the patient became hypotensive and suddenly developed asystole.  Cardiac massage was administered along with multiple IV medications including epinephrine, calcium, magnesium and bicarbonate pushes.  The heart transiently improved and ABG revealed a profound mixed acidosis and hyperkalemia.  Concern was for visceral malperfusion secondary to aortic arch and descending aortic dissections seen on echocardiogram.  The heart subsequently became less contractile and hypotension worsened despite maximum pressor utilization.  Cardiac massage was again initiated and the heart was more edematous and less responsive.  After quite some time of cardiac massage and medication administration, a reasonable blood pressure could not be achieved.  Despite heroic measures from all of the operative staff, this was felt to not be survivable.  The patient  at 2324.      The sternum was reapproximated with #7 stainless steel wire and the linea alba was closed with a running 0 Vicryl suture. Subcutaneous tissues were closed with a 2-0 Vicryl suture and the inferior aspect of the incision was closed with additional interrupted 2-0 Vicryl sutures in the dermal layer. The skin was reapproximated with a 4-0 Monocryl subcuticular stitch.   The previously placed chest tubes were removed and incisions closed with Ethibond sutures.

## 2023-11-14 NOTE — ANESTHESIA POSTPROCEDURE EVALUATION
Patient: Ronaldo Spain    Procedure Summary       Date: 23 Room / Location:  [facility]  [facility]  [facility]    Anesthesia Start:  Anesthesia Stop:     Procedure: MEDIAN STERNTOMY, CORONARY ARTERY BYPASS GRAFTING X ,UTILIZING THE LEFT INTERNAL MAMMARY ARTERY, EVH OF THE LEFT  GREATER SAPHENOUS VEIN,  ELMIRA PER ANESTHESIA (Chest) Diagnosis:       Coronary artery disease involving native heart with angina pectoris, unspecified vessel or lesion type      (Coronary artery disease involving native heart with angina pectoris, unspecified vessel or lesion type [I25.119])    Surgeons: Louis Hameed MD Provider: Hamilton Haines MD    Anesthesia Type: general ASA Status: 4            Anesthesia Type: general    Vitals  No vitals data found for the desired time range.          Post Anesthesia Care and Evaluation    Anesthetic complications: No anesthetic complications    Comments: Pt  in OR; TOD 2324. See intraoperative record or surgeon note for further details.   No anesthesia care post op

## 2023-11-14 NOTE — SIGNIFICANT NOTE
Exam confirms with auscultation zero audible heart tones and zero audible respirations. Mr.Rodney Cole Spain was pronounced dead at 2324.  MD notified by Patient's RN.    Jessica Plata RN  Clinical House Supervisor  11/14/2023 01:27 EST

## 2023-11-14 NOTE — PROGRESS NOTES
I had a long discussion with the patient's sister, brother, nephew and other family present.  We discussed the operative events and they were appropriately upset.  They asked appropriate questions.  The patient's sister noted that just prior to wheeling off to the operating room, Mr. Spain stated he was not going to survive surgery.  The ashley joined the family and the  ICU nursing staff planned to have the family back for visitation.

## 2023-11-15 LAB
BASE EXCESS BLDA CALC-SCNC: 0 MMOL/L (ref -5–5)
BASE EXCESS BLDA CALC-SCNC: 2 MMOL/L (ref -5–5)
BASE EXCESS BLDA CALC-SCNC: 6 MMOL/L (ref -5–5)
CA-I BLDA-SCNC: 0.96 MMOL/L (ref 1.2–1.32)
CA-I BLDA-SCNC: 0.96 MMOL/L (ref 1.2–1.32)
CA-I BLDA-SCNC: 1.17 MMOL/L (ref 1.2–1.32)
CO2 BLDA-SCNC: 26 MMOL/L (ref 24–29)
CO2 BLDA-SCNC: 28 MMOL/L (ref 24–29)
CO2 BLDA-SCNC: 32 MMOL/L (ref 24–29)
CYTO UR: NORMAL
GLUCOSE BLDC GLUCOMTR-MCNC: 113 MG/DL (ref 70–130)
GLUCOSE BLDC GLUCOMTR-MCNC: 116 MG/DL (ref 70–130)
GLUCOSE BLDC GLUCOMTR-MCNC: 129 MG/DL (ref 70–130)
HCO3 BLDA-SCNC: 24.9 MMOL/L (ref 22–26)
HCO3 BLDA-SCNC: 26.6 MMOL/L (ref 22–26)
HCO3 BLDA-SCNC: 30.6 MMOL/L (ref 22–26)
HCT VFR BLDA CALC: 35 % (ref 38–51)
HCT VFR BLDA CALC: 37 % (ref 38–51)
HCT VFR BLDA CALC: 45 % (ref 38–51)
HGB BLDA-MCNC: 11.9 G/DL (ref 12–17)
HGB BLDA-MCNC: 12.6 G/DL (ref 12–17)
HGB BLDA-MCNC: 15.3 G/DL (ref 12–17)
LAB AP CASE REPORT: NORMAL
LAB AP CLINICAL INFORMATION: NORMAL
PATH REPORT.FINAL DX SPEC: NORMAL
PATH REPORT.GROSS SPEC: NORMAL
PCO2 BLDA: 41.1 MM HG (ref 35–45)
PCO2 BLDA: 42.9 MM HG (ref 35–45)
PCO2 BLDA: 47.7 MM HG (ref 35–45)
PH BLDA: 7.39 PH UNITS (ref 7.35–7.6)
PH BLDA: 7.4 PH UNITS (ref 7.35–7.6)
PH BLDA: 7.42 PH UNITS (ref 7.35–7.6)
PO2 BLDA: 247 MMHG (ref 80–105)
PO2 BLDA: 377 MMHG (ref 80–105)
PO2 BLDA: 69 MMHG (ref 80–105)
POTASSIUM BLDA-SCNC: 3.2 MMOL/L (ref 3.5–4.9)
POTASSIUM BLDA-SCNC: 3.8 MMOL/L (ref 3.5–4.9)
POTASSIUM BLDA-SCNC: 6.5 MMOL/L (ref 3.5–4.9)
SAO2 % BLDA: 100 % (ref 95–98)
SAO2 % BLDA: 100 % (ref 95–98)
SAO2 % BLDA: 93 % (ref 95–98)
SODIUM BLD-SCNC: 130 MMOL/L (ref 138–146)
SODIUM BLD-SCNC: 139 MMOL/L (ref 138–146)
SODIUM BLD-SCNC: 141 MMOL/L (ref 138–146)

## 2023-11-16 LAB
BASE EXCESS BLDA CALC-SCNC: -12 MMOL/L (ref -5–5)
BASE EXCESS BLDA CALC-SCNC: -16 MMOL/L (ref -5–5)
BASE EXCESS BLDA CALC-SCNC: -2 MMOL/L (ref -5–5)
BASE EXCESS BLDA CALC-SCNC: -8 MMOL/L (ref -5–5)
BASE EXCESS BLDA CALC-SCNC: 13 MMOL/L (ref -5–5)
BASE EXCESS BLDA CALC-SCNC: 2 MMOL/L (ref -5–5)
BASE EXCESS BLDA CALC-SCNC: 2 MMOL/L (ref -5–5)
BASE EXCESS BLDA CALC-SCNC: 3 MMOL/L (ref -5–5)
BASE EXCESS BLDA CALC-SCNC: 4 MMOL/L (ref -5–5)
BASE EXCESS BLDA CALC-SCNC: 4 MMOL/L (ref -5–5)
BASE EXCESS BLDA CALC-SCNC: 5 MMOL/L (ref -5–5)
BASE EXCESS BLDA CALC-SCNC: 5 MMOL/L (ref -5–5)
CA-I BLDA-SCNC: 0.97 MMOL/L (ref 1.2–1.32)
CA-I BLDA-SCNC: 0.99 MMOL/L (ref 1.2–1.32)
CA-I BLDA-SCNC: 1 MMOL/L (ref 1.2–1.32)
CA-I BLDA-SCNC: 1.01 MMOL/L (ref 1.2–1.32)
CA-I BLDA-SCNC: 1.2 MMOL/L (ref 1.2–1.32)
CA-I BLDA-SCNC: 1.21 MMOL/L (ref 1.2–1.32)
CA-I BLDA-SCNC: 1.21 MMOL/L (ref 1.2–1.32)
CA-I BLDA-SCNC: 1.3 MMOL/L (ref 1.2–1.32)
CA-I BLDA-SCNC: 1.32 MMOL/L (ref 1.2–1.32)
CA-I BLDA-SCNC: 1.39 MMOL/L (ref 1.2–1.32)
CA-I BLDA-SCNC: 1.43 MMOL/L (ref 1.2–1.32)
CA-I BLDA-SCNC: 1.49 MMOL/L (ref 1.2–1.32)
CA-I BLDA-SCNC: >2.5 MMOL/L (ref 1.2–1.32)
CO2 BLDA-SCNC: 13 MMOL/L (ref 24–29)
CO2 BLDA-SCNC: 20 MMOL/L (ref 24–29)
CO2 BLDA-SCNC: 22 MMOL/L (ref 24–29)
CO2 BLDA-SCNC: 25 MMOL/L (ref 24–29)
CO2 BLDA-SCNC: 28 MMOL/L (ref 24–29)
CO2 BLDA-SCNC: 29 MMOL/L (ref 24–29)
CO2 BLDA-SCNC: 30 MMOL/L (ref 24–29)
CO2 BLDA-SCNC: 31 MMOL/L (ref 24–29)
CO2 BLDA-SCNC: 31 MMOL/L (ref 24–29)
CO2 BLDA-SCNC: 40 MMOL/L (ref 24–29)
GLUCOSE BLDC GLUCOMTR-MCNC: 129 MG/DL (ref 70–130)
GLUCOSE BLDC GLUCOMTR-MCNC: 131 MG/DL (ref 70–130)
GLUCOSE BLDC GLUCOMTR-MCNC: 132 MG/DL (ref 70–130)
GLUCOSE BLDC GLUCOMTR-MCNC: 139 MG/DL (ref 70–130)
GLUCOSE BLDC GLUCOMTR-MCNC: 141 MG/DL (ref 70–130)
GLUCOSE BLDC GLUCOMTR-MCNC: 147 MG/DL (ref 70–130)
GLUCOSE BLDC GLUCOMTR-MCNC: 149 MG/DL (ref 70–130)
GLUCOSE BLDC GLUCOMTR-MCNC: 150 MG/DL (ref 70–130)
GLUCOSE BLDC GLUCOMTR-MCNC: 157 MG/DL (ref 70–130)
GLUCOSE BLDC GLUCOMTR-MCNC: 160 MG/DL (ref 70–130)
GLUCOSE BLDC GLUCOMTR-MCNC: 167 MG/DL (ref 70–130)
GLUCOSE BLDC GLUCOMTR-MCNC: 175 MG/DL (ref 70–130)
GLUCOSE BLDC GLUCOMTR-MCNC: 180 MG/DL (ref 70–130)
GLUCOSE BLDC GLUCOMTR-MCNC: 200 MG/DL (ref 70–130)
GLUCOSE BLDC GLUCOMTR-MCNC: 219 MG/DL (ref 70–130)
HCO3 BLDA-SCNC: 12.2 MMOL/L (ref 22–26)
HCO3 BLDA-SCNC: 17.8 MMOL/L (ref 22–26)
HCO3 BLDA-SCNC: 20 MMOL/L (ref 22–26)
HCO3 BLDA-SCNC: 23.5 MMOL/L (ref 22–26)
HCO3 BLDA-SCNC: 26.7 MMOL/L (ref 22–26)
HCO3 BLDA-SCNC: 27.4 MMOL/L (ref 22–26)
HCO3 BLDA-SCNC: 27.5 MMOL/L (ref 22–26)
HCO3 BLDA-SCNC: 27.5 MMOL/L (ref 22–26)
HCO3 BLDA-SCNC: 27.7 MMOL/L (ref 22–26)
HCO3 BLDA-SCNC: 27.7 MMOL/L (ref 22–26)
HCO3 BLDA-SCNC: 27.9 MMOL/L (ref 22–26)
HCO3 BLDA-SCNC: 28.6 MMOL/L (ref 22–26)
HCO3 BLDA-SCNC: 29.4 MMOL/L (ref 22–26)
HCO3 BLDA-SCNC: 30 MMOL/L (ref 22–26)
HCO3 BLDA-SCNC: 38 MMOL/L (ref 22–26)
HCT VFR BLDA CALC: 27 % (ref 38–51)
HCT VFR BLDA CALC: 27 % (ref 38–51)
HCT VFR BLDA CALC: 28 % (ref 38–51)
HCT VFR BLDA CALC: 28 % (ref 38–51)
HCT VFR BLDA CALC: 29 % (ref 38–51)
HCT VFR BLDA CALC: 30 % (ref 38–51)
HCT VFR BLDA CALC: 31 % (ref 38–51)
HCT VFR BLDA CALC: 32 % (ref 38–51)
HCT VFR BLDA CALC: 33 % (ref 38–51)
HCT VFR BLDA CALC: 34 % (ref 38–51)
HCT VFR BLDA CALC: 40 % (ref 38–51)
HGB BLDA-MCNC: 10.2 G/DL (ref 12–17)
HGB BLDA-MCNC: 10.5 G/DL (ref 12–17)
HGB BLDA-MCNC: 10.9 G/DL (ref 12–17)
HGB BLDA-MCNC: 11.2 G/DL (ref 12–17)
HGB BLDA-MCNC: 11.6 G/DL (ref 12–17)
HGB BLDA-MCNC: 13.6 G/DL (ref 12–17)
HGB BLDA-MCNC: 9.2 G/DL (ref 12–17)
HGB BLDA-MCNC: 9.2 G/DL (ref 12–17)
HGB BLDA-MCNC: 9.5 G/DL (ref 12–17)
HGB BLDA-MCNC: 9.5 G/DL (ref 12–17)
HGB BLDA-MCNC: 9.9 G/DL (ref 12–17)
PCO2 BLDA: 31.7 MM HG (ref 35–45)
PCO2 BLDA: 41.2 MM HG (ref 35–45)
PCO2 BLDA: 42.3 MM HG (ref 35–45)
PCO2 BLDA: 42.9 MM HG (ref 35–45)
PCO2 BLDA: 42.9 MM HG (ref 35–45)
PCO2 BLDA: 44 MM HG (ref 35–45)
PCO2 BLDA: 44.3 MM HG (ref 35–45)
PCO2 BLDA: 44.5 MM HG (ref 35–45)
PCO2 BLDA: 44.5 MM HG (ref 35–45)
PCO2 BLDA: 45.5 MM HG (ref 35–45)
PCO2 BLDA: 46.2 MM HG (ref 35–45)
PCO2 BLDA: 46.7 MM HG (ref 35–45)
PCO2 BLDA: 53.7 MM HG (ref 35–45)
PCO2 BLDA: 58.8 MM HG (ref 35–45)
PCO2 BLDA: 61.1 MM HG (ref 35–45)
PH BLDA: 7.07 PH UNITS (ref 7.35–7.6)
PH BLDA: 7.18 PH UNITS (ref 7.35–7.6)
PH BLDA: 7.19 PH UNITS (ref 7.35–7.6)
PH BLDA: 7.35 PH UNITS (ref 7.35–7.6)
PH BLDA: 7.38 PH UNITS (ref 7.35–7.6)
PH BLDA: 7.38 PH UNITS (ref 7.35–7.6)
PH BLDA: 7.4 PH UNITS (ref 7.35–7.6)
PH BLDA: 7.4 PH UNITS (ref 7.35–7.6)
PH BLDA: 7.42 PH UNITS (ref 7.35–7.6)
PH BLDA: 7.43 PH UNITS (ref 7.35–7.6)
PH BLDA: 7.44 PH UNITS (ref 7.35–7.6)
PO2 BLDA: 117 MMHG (ref 80–105)
PO2 BLDA: 206 MMHG (ref 80–105)
PO2 BLDA: 328 MMHG (ref 80–105)
PO2 BLDA: 360 MMHG (ref 80–105)
PO2 BLDA: 371 MMHG (ref 80–105)
PO2 BLDA: 373 MMHG (ref 80–105)
PO2 BLDA: 377 MMHG (ref 80–105)
PO2 BLDA: 381 MMHG (ref 80–105)
PO2 BLDA: 385 MMHG (ref 80–105)
PO2 BLDA: 411 MMHG (ref 80–105)
PO2 BLDA: 46 MMHG (ref 80–105)
PO2 BLDA: 485 MMHG (ref 80–105)
PO2 BLDA: 528 MMHG (ref 80–105)
PO2 BLDA: 70 MMHG (ref 80–105)
PO2 BLDA: 86 MMHG (ref 80–105)
POTASSIUM BLDA-SCNC: 4 MMOL/L (ref 3.5–4.9)
POTASSIUM BLDA-SCNC: 4.7 MMOL/L (ref 3.5–4.9)
POTASSIUM BLDA-SCNC: 5.3 MMOL/L (ref 3.5–4.9)
POTASSIUM BLDA-SCNC: 5.3 MMOL/L (ref 3.5–4.9)
POTASSIUM BLDA-SCNC: 5.4 MMOL/L (ref 3.5–4.9)
POTASSIUM BLDA-SCNC: 5.4 MMOL/L (ref 3.5–4.9)
POTASSIUM BLDA-SCNC: 5.5 MMOL/L (ref 3.5–4.9)
POTASSIUM BLDA-SCNC: 5.7 MMOL/L (ref 3.5–4.9)
POTASSIUM BLDA-SCNC: 5.9 MMOL/L (ref 3.5–4.9)
POTASSIUM BLDA-SCNC: 6 MMOL/L (ref 3.5–4.9)
POTASSIUM BLDA-SCNC: 6.1 MMOL/L (ref 3.5–4.9)
POTASSIUM BLDA-SCNC: 6.2 MMOL/L (ref 3.5–4.9)
POTASSIUM BLDA-SCNC: 6.8 MMOL/L (ref 3.5–4.9)
SAO2 % BLDA: 100 % (ref 95–98)
SAO2 % BLDA: 81 % (ref 95–98)
SAO2 % BLDA: 90 % (ref 95–98)
SAO2 % BLDA: 91 % (ref 95–98)
SAO2 % BLDA: 97 % (ref 95–98)
SODIUM BLD-SCNC: 129 MMOL/L (ref 138–146)
SODIUM BLD-SCNC: 130 MMOL/L (ref 138–146)
SODIUM BLD-SCNC: 131 MMOL/L (ref 138–146)
SODIUM BLD-SCNC: 132 MMOL/L (ref 138–146)
SODIUM BLD-SCNC: 133 MMOL/L (ref 138–146)
SODIUM BLD-SCNC: 133 MMOL/L (ref 138–146)
SODIUM BLD-SCNC: 134 MMOL/L (ref 138–146)
SODIUM BLD-SCNC: 137 MMOL/L (ref 138–146)
SODIUM BLD-SCNC: 138 MMOL/L (ref 138–146)
SODIUM BLD-SCNC: 143 MMOL/L (ref 138–146)
SODIUM BLD-SCNC: 158 MMOL/L (ref 138–146)

## 2023-11-16 NOTE — PROGRESS NOTES
"Enter Query Response Below     As requested by clinical documentation by [facility] query - Coagulopathy secondary to bleeding and consumption with aortic dissection.        Electronically signed by Louis Hameed MD, 11/15/23, 9:24 PM EST.             If applicable, please update the problem list.   Patient: Ronaldo Spain        : 1970  Account: [id number]           Admit Date: 2023        How to Respond to this query:       a. Click New Note     b. Answer query within the yellow box.                c. Update the Problem List, if applicable.      If you have any questions about this query contact me at: [email or web address]     :     Patient in OR for Cabg.  Intraop labs at 2126 showed H&H 11.7/36.0, Platelets 121, PT 18.0, INR 1.48, Fibrinogen 276, PTT 53.5.  Per Op note \"There was bleeding posteriorly quite low behind the aorta well below the proximal anastomosis.\"  \"Pledgeted prolene sutures were placed with failure to obtain hemostasis.\"    Patient received protamine, 2 of FFP, 2 pack of platelets, 2 of Cryo and 10 units of PRBC's in OR.    Please clarify if patient treated/monitored for the following:    - Defibrination syndrome.  - Coagulopathy unspecified.  - Other__________.  - Unable to determine.    By submitting this query, we are merely seeking further clarification of documentation to accurately reflect all conditions that you are monitoring, evaluating, treating or that extend the hospitalization or utilize additional resources of care. Please utilize your independent clinical judgment when addressing the question(s) above.     This query and your response, once completed, will be entered into the legal medical record.    Sincerely,  Sylvie Ny RN  Clinical Documentation Integrity Program     "

## 2023-11-20 NOTE — DISCHARGE SUMMARY
CTS Discharge Summary    Patient Care Team:  Kreis, Samuel Duane, MD as PCP - General (Family Medicine)  Consults:   Consults       No orders found from 10/11/2023 to 11/10/2023.            Date of Admission: 11/9/2023 10:56 PM  Date of Death: 11/13/2023  Time of Death: 2324    Discharge Diagnosis  Past Medical History:   Diagnosis Date    Coronary artery disease involving native heart with angina pectoris 11/08/2023    Crohn's colitis     Crohn's disease     Hypertension 2005    Lupus 2019-20    Myocardial infarction 08/18/2013    Primary central sleep apnea [id number]    Sleep apnea, obstructive 2015-17     Patient Active Problem List   Diagnosis    Chest pain    Encounter to establish care    SOB (shortness of breath)    Essential hypertension    NSTEMI (non-ST elevated myocardial infarction)    Coronary artery disease involving native heart with angina pectoris    CAD    Sleep apnea    Tobacco use    Class 1 obesity in adult       Coronary artery disease [I25.10]     Procedures Performed  Procedure(s):  MEDIAN STERNOTOMY, CORONARY ARTERY BYPASS GRAFTING X3,UTILIZING THE LEFT INTERNAL MAMMARY ARTERY, EVH OF THE LEFT GREATER SAPHENOUS VEIN, ELMIRA PER ANESTHESIA     History of Present Illness  Patient is a 53 y.o. male with a history of hypertension, sleep apnea, stroke, active tobacco abuse, Crohn's disease and lupus who presented with chest pain on Tuesday morning.  This pressure-like substernal chest pain radiating to the jaw and arm prompted evaluation of [facility].  He had associated nausea, vomiting and shortness of breath. Cardiac catheterization performed 11/8/2023, personally reviewed, demonstrates 60 to 80% proximal LAD stenosis, 90% second diagonal ostial stenosis and 99% septal branch stenosis.  70 to 80% first obtuse marginal stenosis 70% second obtuse marginal stenosis and occlusion of the distal circumflex coronary artery.  The right coronary artery gives rise to a PDA with 90% distal  stenosis.  Echocardiogram from 2023, demonstrates EF 56 to 60%, grade 1 left ventricular diastolic dysfunction and mild dilation of the aortic root measuring 3.9 cm.       Hospital Course  Patient was taken to the operating room on 23 for coronary artery bypass grafting with endoscopic vein harvest and ascending aortic dissection repair.  Visual inspection was concerning for a dissection on the ascending aorta during the procedure.  The ascending aortic dissection was repaired with a Hemashield Robinson tube graft. The patient was successfully weaned off cardiopulmonary bypass. At the end of the procedure, the patient suddenly developed asystole and despite maximum efforts a reasonable blood pressure could not be achieved and patient  in the operating room at 2324.      Discharge Medications     Discharge Medications        ASK your doctor about these medications        Instructions Start Date   albuterol sulfate  (90 Base) MCG/ACT inhaler  Commonly known as: PROVENTIL HFA;VENTOLIN HFA;PROAIR HFA   Inhale 2 puffs by mouth Every 4 (Four) Hours As Needed for Wheezing.      benzonatate 200 MG capsule  Commonly known as: TESSALON   200 mg, Oral, 3 Times Daily PRN      Budeson-Glycopyrrol-Formoterol 160-9-4.8 MCG/ACT aerosol inhaler  Commonly known as: BREZTRI   2 puffs, Inhalation, 2 Times Daily      hydroCHLOROthiazide 25 MG tablet  Commonly known as: HYDRODIURIL   25 mg, Oral, Daily, as directed      HYDROcodone-acetaminophen 7.5-325 MG per tablet  Commonly known as: NORCO   1 tablet, Oral, Every 8 Hours PRN      labetalol 200 MG tablet  Commonly known as: NORMODYNE   400 mg, Oral, 2 Times Daily      lisinopril 40 MG tablet  Commonly known as: PRINIVIL,ZESTRIL  Ask about: Which instructions should I use?   2 tablets, Oral, 2 Times Daily      omeprazole 40 MG capsule  Commonly known as: priLOSEC   Take 1 capsule by mouth Daily.      promethazine 25 MG tablet  Commonly known as: PHENERGAN   25  mg, Oral, Every 6 Hours PRN      sertraline 100 MG tablet  Commonly known as: ZOLOFT   150 mg, Oral, Daily      sucralfate 1 g tablet  Commonly known as: CARAFATE   Take 1 tablet by mouth 4 (Four) Times a Day.      tiZANidine 4 MG tablet  Commonly known as: ZANAFLEX   4 mg, Oral, 3 Times Daily PRN      Vitamin D3 1.25 MG (31030 UT) capsule   50,000 Units, Oral, Every 7 Days, Fridays                 Follow-up Appointments  No future appointments.        Olimpia Haas PA-C  11/20/23  08:28 EST

## 2023-11-22 LAB
ACT BLD: 131 SECONDS (ref 82–152)
ACT BLD: 414 SECONDS (ref 82–152)
ACT BLD: 498 SECONDS (ref 82–152)

## 2023-11-29 LAB
ACT BLD: 155 SECONDS (ref 82–152)
ACT BLD: 161 SECONDS (ref 82–152)
ACT BLD: 257 SECONDS (ref 82–152)
ACT BLD: 353 SECONDS (ref 82–152)
ACT BLD: 407 SECONDS (ref 82–152)
ACT BLD: 450 SECONDS (ref 82–152)
ACT BLD: 474 SECONDS (ref 82–152)
ACT BLD: 486 SECONDS (ref 82–152)
ACT BLD: 492 SECONDS (ref 82–152)
ACT BLD: 498 SECONDS (ref 82–152)
ACT BLD: 504 SECONDS (ref 82–152)
ACT BLD: 522 SECONDS (ref 82–152)
ACT BLD: 522 SECONDS (ref 82–152)
ACT BLD: 528 SECONDS (ref 82–152)

## 2024-09-25 NOTE — DISCHARGE SUMMARY
[facility] DISCHARGE SUMMARY      Date of Admission: 11/7/2023    Date of Discharge: 11/9/2023     PCP: Kreis, Samuel Duane, MD    Admission Diagnosis:   Please see admission H&P    Discharge Diagnosis:   NSTEMI  CAD  Essential HTN  SIRS  Hyperglycemia/prediabetes  Tobacco abuse  Obesity by BMI    Procedures Performed:  11/8/23: LHC    Prox LAD lesion is 60% stenosed.    Mid LAD lesion is 80% stenosed.    1st Mrg lesion is 70% stenosed.    2nd Mrg lesion is 70% stenosed.    RPDA lesion is 90% stenosed.     1.  Cardiac.  Patient with significant coronary artery disease involving multiple vessels.  CT surgery evaluation will be requested.  Dr. Hameed consulted     Consults:   Consults       Date and Time Order Name Status Description    11/8/2023  3:42 AM Inpatient Cardiology Consult                History of Present Illness:  Ronaldo Spain is a 53 y.o. male who presented to [facility] ED with CC of chest pain. Please see admission H&P for complete details.     In the ED, EKG revealed some ST depression inferiorly. Troponin's were positive and trended upward on repeat. D-dimer was negative. CXR revealed some pulmonary vascular congestion. He was given full dose ASA in the ED with initiation of a heparin gtt.      Hospital Course  Ronaldo Spain was admitted to the PCU for further evaluation and treatment. He was continued on ASA, statin and the heparin gtt. He was hypertensive on admission with persistent CP reported and a nitro gtt was initiated. Cardiology was consulted for further input.     Troponin's continued to trend upward. His CP did improve with initiation of the nitro gtt. An echo was obtained revealing an EF of 56-60%, grade I diastolic dysfunction and mild dilation of the aortic root. He was evaluated by cardiology and taken for [facility] on 11/8 which revealed multivessel CAD. Cardiology contacted CT surgery at [facility] who graciously agreed to accept him in transfer for evaluation of CABG.  Following his LHC, the heparin gtt was resumed. He remained CP free and nitro gtt was weaned. He remained hospitalized at our facility until a bed became available. A bed became available in the evening on 11/9 and arrangements were made for transfer to [facility] via ambulance.     Condition on Discharge:  Stable    Vital Signs  Vitals:    11/09/23 1615   BP: 100/67   Pulse: 80   Resp: 19   Temp:    SpO2:        Physical Exam:  General:    Awake, alert, in no acute distress   Heart:      Normal S1 and S2. Regular rate and rhythm. No significant murmur, rubs or gallops appreciated.   Lungs:     Respirations regular, even and unlabored. Lungs clear to auscultation B/L. No wheezes, rales or rhonchi.   Abdomen:   Soft and nontender. No guarding, rebound tenderness or  organomegaly noted. Bowel sounds present x 4.   Extremities:  No clubbing, cyanosis or edema noted. Moves UE and LE equally B/L.     Discharge Disposition:   Transfer to  [place]       Discharge Medications:     Discharge Medications        New Medications        Instructions Start Date   aspirin 81 MG chewable tablet   81 mg, Oral, Daily   Start Date: November 10, 2023     atorvastatin 40 MG tablet  Commonly known as: LIPITOR   40 mg, Oral, Nightly      Heparin (Porcine) 09931-6.45 UT/250ML-% infusion   13 Units/kg/hr (1,300 Units/hr), Intravenous, Titrated      nicotine 14 MG/24HR patch  Commonly known as: NICODERM CQ   1 patch, Transdermal, Every 24 Hours Scheduled   Start Date: November 10, 2023            Changes to Medications        Instructions Start Date   lisinopril 40 MG tablet  Commonly known as: PRINTRINIILZESTRIL  What changed:   how much to take  when to take this   40 mg, Oral, Daily   Start Date: November 10, 2023            Continue These Medications        Instructions Start Date   albuterol sulfate  (90 Base) MCG/ACT inhaler  Commonly known as: PROVENTIL HFA;VENTOLIN HFA;PROAIR HFA   2 puffs, Inhalation, Every 4 Hours PRN       benzonatate 200 MG capsule  Commonly known as: TESSALON   200 mg, Oral, 3 Times Daily PRN      Budeson-Glycopyrrol-Formoterol 160-9-4.8 MCG/ACT aerosol inhaler  Commonly known as: BREZTRI   2 puffs, Inhalation, 2 Times Daily      hydroCHLOROthiazide 25 MG tablet  Commonly known as: HYDRODIURIL   25 mg, Oral, Daily, as directed      HYDROcodone-acetaminophen 7.5-325 MG per tablet  Commonly known as: NORCO   1 tablet, Oral, Every 8 Hours PRN      labetalol 200 MG tablet  Commonly known as: NORMODYNE   400 mg, Oral, 2 Times Daily      omeprazole 40 MG capsule  Commonly known as: priLOSEC   Take 1 capsule by mouth 2 (Two) Times a Day.      promethazine 25 MG tablet  Commonly known as: PHENERGAN   25 mg, Oral, Every 6 Hours PRN      sertraline 100 MG tablet  Commonly known as: ZOLOFT   150 mg, Oral, Daily      sucralfate 1 g tablet  Commonly known as: CARAFATE   Take 1 tablet by mouth 4 (Four) Times a Day.      tiZANidine 4 MG tablet  Commonly known as: ZANAFLEX   4 mg, Oral, 3 Times Daily PRN      vitamin D 1.25 MG (05783 UT) capsule capsule  Commonly known as: ERGOCALCIFEROL   50,000 Units, Oral, Weekly      Vitamin D3 1.25 MG (66453 UT) capsule   50,000 Units, Oral, Every 7 Days, Fridays                 Discharge Diet:   Dietary Orders (From admission, onward)       Start     Ordered    11/08/23 1146  Diet: Cardiac Diets; Healthy Heart (2-3 Na+); Texture: Regular Texture (IDDSI 7); Fluid Consistency: Thin (IDDSI 0)  Diet Effective Now        References:    Diet Order [place]   Question Answer Comment   Diets: Cardiac Diets    Cardiac Diet: Healthy Heart (2-3 Na+)    Texture: Regular Texture (IDDSI 7)    Fluid Consistency: Thin (IDDSI 0)        11/08/23 1147                    Activity at Discharge:  activity as tolerated    Follow-up Appointments:   Follow-up Information       Kreis, Samuel Duane, MD .    Specialty: Family Medicine  Contact information:  [address] [place] [address] [place] [address] [phone]                            Your Scheduled Appointments      Apr 10, 2024  9:00 AM  Follow Up with TATUM Coburn  [facility]INE ([place]) [address]  [place] [address] [phone]   Established: Please bring outside images or reports.                  Test Results Pending at Discharge:       The ASCVD Risk score (Jeremiah BROWN, et al., 2019) failed to calculate for the following reasons:    The patient has a prior MI or stroke diagnosis      Mian Russell DO  11/09/23  18:23 EST      Time: Greater than 30 minutes spent on this discharge.          25-Sep-2024 19:12 25-Sep-2024 18:54

## (undated) DEVICE — CVR PROB ULTRASND/TRANSD W/GEL LNG 18X250CM STRL

## (undated) DEVICE — PK HEART OPN 10

## (undated) DEVICE — MYOCARDIAL PROBE NON-PYROGENIC: Brand: DEROYAL

## (undated) DEVICE — 8 FOOT DISPOSABLE EXTENSION CABLE WITH SAFE CONNECT / ALLIGATOR CLIP

## (undated) DEVICE — SYS VASOVIEW HEMOPRO ENDOSCOPIC HARVST VESL

## (undated) DEVICE — TUBING, SUCTION, 1/4" X 10', STRAIGHT: Brand: MEDLINE

## (undated) DEVICE — ANGIO-SEAL VIP VASCULAR CLOSURE DEVICE: Brand: ANGIO-SEAL

## (undated) DEVICE — ADULT DISPOSABLE SINGLE-PATIENT USE PULSE OXIMETER SENSOR: Brand: NONIN

## (undated) DEVICE — PAD ARMBRD SURG CONVOL 7.5X20X2IN

## (undated) DEVICE — CATH F5 INF 3DRC 100CM: Brand: INFINITI

## (undated) DEVICE — ST EXT IV SMRTSTE 2VLV FIX M LL 6ML 41

## (undated) DEVICE — ADAPT ANTEGRADE RETRGR

## (undated) DEVICE — BNDR ABD PREMIUM/UNIV 10IN 27TO48IN

## (undated) DEVICE — GLV SURG BIOGELULTRATOUCH POLYISPRN PF LF SZ7 STRL

## (undated) DEVICE — TB SXN SURG DLP MALL/CARD SFT/TP 6F 6IN

## (undated) DEVICE — ANTIBACTERIAL UNDYED BRAIDED (POLYGLACTIN 910), SYNTHETIC ABSORBABLE SUTURE: Brand: COATED VICRYL

## (undated) DEVICE — SUT PROLN 3/0 V7 D/A 36IN 8976H

## (undated) DEVICE — PK ATS CUST W CARDIOTOMY RESEVOIR

## (undated) DEVICE — TRAP FLD MINIVAC MEGADYNE 100ML

## (undated) DEVICE — SUT PROLN CARDIO V5 3/0 36IN 8936H

## (undated) DEVICE — BLD SCLPL BEAVR MINI STR 2BVL 180D LF

## (undated) DEVICE — SUT ETHIB 1 CT1 30IN  X425H

## (undated) DEVICE — 3M™ MEDIPORE™ H SOFT CLOTH SURGICAL TAPE, 2863, 3 IN X 10 YD, 12/CASE: Brand: 3M™ MEDIPORE™

## (undated) DEVICE — 360 - 360I 10"/10" CMSQ 8RA: Brand: MEDLINE

## (undated) DEVICE — 6F .070 XB 3.5 100CM: Brand: VISTA BRITE TIP

## (undated) DEVICE — SHROD PENCL MEGADYNE ATTACHAVAC W/CONN/22MM

## (undated) DEVICE — SHEATH INTRO SUPERSHEATH SHRT .035 4F 11CM

## (undated) DEVICE — DRSNG SURESITE WNDW 4X4.5

## (undated) DEVICE — SUT SILK 2 SUTUPAK TIE 60IN SA8H 2STRAND

## (undated) DEVICE — SHEET,DRAPE,53X77,STERILE: Brand: MEDLINE

## (undated) DEVICE — PENCL ROCKRSWCH MEGADYNE W/HOLSTR 10FT SS

## (undated) DEVICE — SUCTION CANISTER 2500CC: Brand: DEROYAL

## (undated) DEVICE — SUT PROLN 4/0 V7 36IN 8975H

## (undated) DEVICE — 12 FOOT DISPOSABLE EXTENSION CABLE WITH SAFE CONNECT / SCREW-DOWN

## (undated) DEVICE — SUT SILK 2/0 CT1 CR8 18IN C022D

## (undated) DEVICE — PAD, DEFIB, ADULT, RADIOTRANS, ZOLL: Brand: MEDLINE

## (undated) DEVICE — Device

## (undated) DEVICE — SUT PROLN SURGILENE SURGIPRO 8 0 24IN BL

## (undated) DEVICE — CANN VESL DLP 1WY BLNT/TP 3MM

## (undated) DEVICE — PINNACLE INTRODUCER SHEATH: Brand: PINNACLE

## (undated) DEVICE — FLTR RESERV PERFUS INTERSEPT 02 STRL

## (undated) DEVICE — ST INF PRI SMRTSTE 20DRP 2VLV 24ML 117

## (undated) DEVICE — TB SXN DLP RIGD MACROSUCKER 6F 3IN

## (undated) DEVICE — RADIFOCUS GLIDEWIRE ADVANTAGE GUIDEWIRE: Brand: GLIDEWIRE ADVANTAGE

## (undated) DEVICE — PATIENT RETURN ELECTRODE, SINGLE-USE, CONTACT QUALITY MONITORING, ADULT, WITH 9FT CORD, FOR PATIENTS WEIGING OVER 33LBS. (15KG): Brand: MEGADYNE

## (undated) DEVICE — PK CATH CARD 70

## (undated) DEVICE — OASIS DRAIN, SINGLE, INLINE & ATS COMPATIBLE: Brand: OASIS

## (undated) DEVICE — SENSR CERBRL O2 PK/2

## (undated) DEVICE — DEV INFL MONARCH 25W

## (undated) DEVICE — CANN AORT ROOT DLP VNT/8IN 14G 7F

## (undated) DEVICE — EP LEFT SUBCLAVIAN SHIELD-YELLOW: Brand: RADPAD

## (undated) DEVICE — LN FLTR ORL/NASL MICROSTREAM NONINTUB A/LNG

## (undated) DEVICE — SUT PROLN 7/0 BV1 D/A 24IN 8702H

## (undated) DEVICE — CAUTRY ACCUTEMP HI TEMP FINE TIP 2IN

## (undated) DEVICE — Device: Brand: ASAHI SION BLUE

## (undated) DEVICE — LEVEL SENSORS PADS ARE USED TO ATTACH THE LEVEL SENSORS TO A HARD SHELL RESERVOIR. INCLUDES COUPLING GEL.: Brand: TERUMO® ADVANCED PERFUSION SYSTEM 1

## (undated) DEVICE — CLTH CLENS READYCLEANSE PERI CARE PK/5

## (undated) DEVICE — AVID DUAL STAGE VENOUS DRAINAGE CANNULA: Brand: AVID DUAL STAGE VENOUS DRAINAGE CANNULA

## (undated) DEVICE — PK PERFUS CUST W/CARDIOPLEGIA

## (undated) DEVICE — GW J/TP MOVE/CORE 0.035 3MM/TP 145CM

## (undated) DEVICE — EZ GLIDE AORTIC CANNULA: Brand: EDWARDS LIFESCIENCES EZ GLIDE AORTIC CANNULA

## (undated) DEVICE — CATHETER,FOLEY,100%SILICONE,18FR,10ML,LF: Brand: MEDLINE

## (undated) DEVICE — INCISIONLINE PLEDGET SFT 22X1 2.75MM

## (undated) DEVICE — SUT PROLN 6/0 C1 D/A 30IN 8706H

## (undated) DEVICE — SYR LUERLOK 30CC

## (undated) DEVICE — SUT SILK 0/0 CT2 18IN C027D

## (undated) DEVICE — CATH F5 INF JL 4 100CM: Brand: INFINITI

## (undated) DEVICE — ADHS SKIN PREMIERPRO EXOFIN TOPICAL HI/VISC .5ML

## (undated) DEVICE — SUT SILK 4/0 TIES 18IN A183H